# Patient Record
Sex: MALE | Race: WHITE | Employment: OTHER | URBAN - METROPOLITAN AREA
[De-identification: names, ages, dates, MRNs, and addresses within clinical notes are randomized per-mention and may not be internally consistent; named-entity substitution may affect disease eponyms.]

---

## 2018-12-15 ENCOUNTER — APPOINTMENT (OUTPATIENT)
Dept: CT IMAGING | Facility: CLINIC | Age: 83
DRG: 470 | End: 2018-12-15
Attending: EMERGENCY MEDICINE
Payer: MEDICARE

## 2018-12-15 ENCOUNTER — ANESTHESIA (OUTPATIENT)
Dept: SURGERY | Facility: CLINIC | Age: 83
DRG: 470 | End: 2018-12-15
Payer: MEDICARE

## 2018-12-15 ENCOUNTER — APPOINTMENT (OUTPATIENT)
Dept: GENERAL RADIOLOGY | Facility: CLINIC | Age: 83
DRG: 470 | End: 2018-12-15
Attending: PHYSICIAN ASSISTANT
Payer: MEDICARE

## 2018-12-15 ENCOUNTER — ANESTHESIA EVENT (OUTPATIENT)
Dept: SURGERY | Facility: CLINIC | Age: 83
DRG: 470 | End: 2018-12-15
Payer: MEDICARE

## 2018-12-15 ENCOUNTER — HOSPITAL ENCOUNTER (INPATIENT)
Facility: CLINIC | Age: 83
LOS: 3 days | Discharge: HOME HEALTH | DRG: 470 | End: 2018-12-18
Attending: EMERGENCY MEDICINE | Admitting: INTERNAL MEDICINE
Payer: MEDICARE

## 2018-12-15 DIAGNOSIS — I71.40 ABDOMINAL AORTIC ANEURYSM (AAA) WITHOUT RUPTURE (H): ICD-10-CM

## 2018-12-15 DIAGNOSIS — S72.002A FRACTURE OF HIP, LEFT, CLOSED, INITIAL ENCOUNTER (H): Primary | ICD-10-CM

## 2018-12-15 DIAGNOSIS — S72.002A CLOSED FRACTURE OF NECK OF LEFT FEMUR, INITIAL ENCOUNTER (H): ICD-10-CM

## 2018-12-15 PROBLEM — S72.009A HIP FRACTURE REQUIRING OPERATIVE REPAIR (H): Status: ACTIVE | Noted: 2018-12-15

## 2018-12-15 LAB
ANION GAP SERPL CALCULATED.3IONS-SCNC: 5 MMOL/L (ref 3–14)
BASOPHILS # BLD AUTO: 0 10E9/L (ref 0–0.2)
BASOPHILS NFR BLD AUTO: 0.4 %
BUN SERPL-MCNC: 21 MG/DL (ref 7–30)
CALCIUM SERPL-MCNC: 8.5 MG/DL (ref 8.5–10.1)
CHLORIDE SERPL-SCNC: 105 MMOL/L (ref 94–109)
CO2 SERPL-SCNC: 27 MMOL/L (ref 20–32)
CREAT SERPL-MCNC: 0.81 MG/DL (ref 0.66–1.25)
DIFFERENTIAL METHOD BLD: ABNORMAL
EOSINOPHIL # BLD AUTO: 0.1 10E9/L (ref 0–0.7)
EOSINOPHIL NFR BLD AUTO: 1.2 %
ERYTHROCYTE [DISTWIDTH] IN BLOOD BY AUTOMATED COUNT: 13.9 % (ref 10–15)
GFR SERPL CREATININE-BSD FRML MDRD: >90 ML/MIN/1.7M2
GLUCOSE SERPL-MCNC: 89 MG/DL (ref 70–99)
HCT VFR BLD AUTO: 40.7 % (ref 40–53)
HGB BLD-MCNC: 12.3 G/DL (ref 13.3–17.7)
IMM GRANULOCYTES # BLD: 0.1 10E9/L (ref 0–0.4)
IMM GRANULOCYTES NFR BLD: 1 %
LYMPHOCYTES # BLD AUTO: 1.9 10E9/L (ref 0.8–5.3)
LYMPHOCYTES NFR BLD AUTO: 20.5 %
MCH RBC QN AUTO: 24.8 PG (ref 26.5–33)
MCHC RBC AUTO-ENTMCNC: 30.2 G/DL (ref 31.5–36.5)
MCV RBC AUTO: 82 FL (ref 78–100)
MONOCYTES # BLD AUTO: 0.9 10E9/L (ref 0–1.3)
MONOCYTES NFR BLD AUTO: 9.9 %
NEUTROPHILS # BLD AUTO: 6.3 10E9/L (ref 1.6–8.3)
NEUTROPHILS NFR BLD AUTO: 67 %
NRBC # BLD AUTO: 0 10*3/UL
NRBC BLD AUTO-RTO: 0 /100
PLATELET # BLD AUTO: 195 10E9/L (ref 150–450)
POTASSIUM SERPL-SCNC: 4 MMOL/L (ref 3.4–5.3)
RBC # BLD AUTO: 4.95 10E12/L (ref 4.4–5.9)
SODIUM SERPL-SCNC: 137 MMOL/L (ref 133–144)
WBC # BLD AUTO: 9.5 10E9/L (ref 4–11)

## 2018-12-15 PROCEDURE — 25000128 H RX IP 250 OP 636: Performed by: ANESTHESIOLOGY

## 2018-12-15 PROCEDURE — 25000125 ZZHC RX 250: Performed by: ORTHOPAEDIC SURGERY

## 2018-12-15 PROCEDURE — 70450 CT HEAD/BRAIN W/O DYE: CPT

## 2018-12-15 PROCEDURE — 27210794 ZZH OR GENERAL SUPPLY STERILE: Performed by: ORTHOPAEDIC SURGERY

## 2018-12-15 PROCEDURE — 36000063 ZZH SURGERY LEVEL 4 EA 15 ADDTL MIN: Performed by: ORTHOPAEDIC SURGERY

## 2018-12-15 PROCEDURE — 37000008 ZZH ANESTHESIA TECHNICAL FEE, 1ST 30 MIN: Performed by: ORTHOPAEDIC SURGERY

## 2018-12-15 PROCEDURE — 72125 CT NECK SPINE W/O DYE: CPT

## 2018-12-15 PROCEDURE — 40000985 XR PELVIS AND HIP PORTABLE LEFT 1 VIEW

## 2018-12-15 PROCEDURE — C1776 JOINT DEVICE (IMPLANTABLE): HCPCS | Performed by: ORTHOPAEDIC SURGERY

## 2018-12-15 PROCEDURE — 25000125 ZZHC RX 250: Performed by: NURSE ANESTHETIST, CERTIFIED REGISTERED

## 2018-12-15 PROCEDURE — 71000014 ZZH RECOVERY PHASE 1 LEVEL 2 FIRST HR: Performed by: ORTHOPAEDIC SURGERY

## 2018-12-15 PROCEDURE — 99285 EMERGENCY DEPT VISIT HI MDM: CPT | Mod: 25

## 2018-12-15 PROCEDURE — 93005 ELECTROCARDIOGRAM TRACING: CPT

## 2018-12-15 PROCEDURE — 96374 THER/PROPH/DIAG INJ IV PUSH: CPT

## 2018-12-15 PROCEDURE — 71000015 ZZH RECOVERY PHASE 1 LEVEL 2 EA ADDTL HR: Performed by: ORTHOPAEDIC SURGERY

## 2018-12-15 PROCEDURE — 27810169 ZZH OR IMPLANT GENERAL: Performed by: ORTHOPAEDIC SURGERY

## 2018-12-15 PROCEDURE — 12000007 ZZH R&B INTERMEDIATE

## 2018-12-15 PROCEDURE — 36000093 ZZH SURGERY LEVEL 4 1ST 30 MIN: Performed by: ORTHOPAEDIC SURGERY

## 2018-12-15 PROCEDURE — 0SRS0J9 REPLACEMENT OF LEFT HIP JOINT, FEMORAL SURFACE WITH SYNTHETIC SUBSTITUTE, CEMENTED, OPEN APPROACH: ICD-10-PCS | Performed by: ORTHOPAEDIC SURGERY

## 2018-12-15 PROCEDURE — 99223 1ST HOSP IP/OBS HIGH 75: CPT | Mod: AI | Performed by: INTERNAL MEDICINE

## 2018-12-15 PROCEDURE — 37000009 ZZH ANESTHESIA TECHNICAL FEE, EACH ADDTL 15 MIN: Performed by: ORTHOPAEDIC SURGERY

## 2018-12-15 PROCEDURE — 72192 CT PELVIS W/O DYE: CPT

## 2018-12-15 PROCEDURE — 25000128 H RX IP 250 OP 636: Performed by: PHYSICIAN ASSISTANT

## 2018-12-15 PROCEDURE — 25000128 H RX IP 250 OP 636: Performed by: NURSE ANESTHETIST, CERTIFIED REGISTERED

## 2018-12-15 PROCEDURE — 25000128 H RX IP 250 OP 636: Performed by: INTERNAL MEDICINE

## 2018-12-15 PROCEDURE — 25000128 H RX IP 250 OP 636: Performed by: ORTHOPAEDIC SURGERY

## 2018-12-15 PROCEDURE — 25000128 H RX IP 250 OP 636: Performed by: EMERGENCY MEDICINE

## 2018-12-15 PROCEDURE — 85025 COMPLETE CBC W/AUTO DIFF WBC: CPT | Performed by: EMERGENCY MEDICINE

## 2018-12-15 PROCEDURE — 25800025 ZZH RX 258: Performed by: ORTHOPAEDIC SURGERY

## 2018-12-15 PROCEDURE — 40000306 ZZH STATISTIC PRE PROC ASSESS II: Performed by: ORTHOPAEDIC SURGERY

## 2018-12-15 PROCEDURE — 80048 BASIC METABOLIC PNL TOTAL CA: CPT | Performed by: EMERGENCY MEDICINE

## 2018-12-15 DEVICE — IMPLANTABLE DEVICE: Type: IMPLANTABLE DEVICE | Site: HIP | Status: FUNCTIONAL

## 2018-12-15 DEVICE — IMP HEAD STRK FEMORAL UHR BIPOLAR 28X52MM UH1-52-28: Type: IMPLANTABLE DEVICE | Site: HIP | Status: FUNCTIONAL

## 2018-12-15 DEVICE — BONE CEMENT MIXING SYSTEM W/FEM PRESSURIZER 06065730000: Type: IMPLANTABLE DEVICE | Status: FUNCTIONAL

## 2018-12-15 DEVICE — IMP HEAD FEMORAL STRK V40 VITALIUM COCR 28MM +0MM 6260-5-128: Type: IMPLANTABLE DEVICE | Site: HIP | Status: FUNCTIONAL

## 2018-12-15 DEVICE — BONE CEMENT STRK SIMPLEX P SPEEDSET 6192-1-001: Type: IMPLANTABLE DEVICE | Site: HIP | Status: FUNCTIONAL

## 2018-12-15 DEVICE — IMP SPACER OSTEONICS DISTAL CEMENT 12MM 1067-0012: Type: IMPLANTABLE DEVICE | Site: HIP | Status: FUNCTIONAL

## 2018-12-15 DEVICE — BONE CEMENT RESTRICTOR BUCK FEMORAL 18.5MM 129418: Type: IMPLANTABLE DEVICE | Site: HIP | Status: FUNCTIONAL

## 2018-12-15 RX ORDER — LIDOCAINE 40 MG/G
CREAM TOPICAL
Status: DISCONTINUED | OUTPATIENT
Start: 2018-12-15 | End: 2018-12-15

## 2018-12-15 RX ORDER — FINASTERIDE 5 MG/1
5 TABLET, FILM COATED ORAL DAILY
COMMUNITY

## 2018-12-15 RX ORDER — LIDOCAINE 40 MG/G
CREAM TOPICAL
Status: DISCONTINUED | OUTPATIENT
Start: 2018-12-15 | End: 2018-12-18 | Stop reason: HOSPADM

## 2018-12-15 RX ORDER — LABETALOL HYDROCHLORIDE 5 MG/ML
10 INJECTION, SOLUTION INTRAVENOUS ONCE
Status: COMPLETED | OUTPATIENT
Start: 2018-12-15 | End: 2018-12-15

## 2018-12-15 RX ORDER — CEFAZOLIN SODIUM 2 G/100ML
2 INJECTION, SOLUTION INTRAVENOUS
Status: COMPLETED | OUTPATIENT
Start: 2018-12-15 | End: 2018-12-15

## 2018-12-15 RX ORDER — DORZOLAMIDE HYDROCHLORIDE AND TIMOLOL MALEATE 20; 5 MG/ML; MG/ML
1 SOLUTION/ DROPS OPHTHALMIC 2 TIMES DAILY
Status: DISCONTINUED | OUTPATIENT
Start: 2018-12-15 | End: 2018-12-18 | Stop reason: HOSPADM

## 2018-12-15 RX ORDER — CARBIDOPA AND LEVODOPA 25; 100 MG/1; MG/1
2 TABLET, EXTENDED RELEASE ORAL 4 TIMES DAILY
Status: DISCONTINUED | OUTPATIENT
Start: 2018-12-15 | End: 2018-12-18 | Stop reason: HOSPADM

## 2018-12-15 RX ORDER — QUETIAPINE FUMARATE 50 MG/1
50 TABLET, FILM COATED ORAL 2 TIMES DAILY
COMMUNITY
End: 2018-12-15

## 2018-12-15 RX ORDER — ONDANSETRON 4 MG/1
4 TABLET, ORALLY DISINTEGRATING ORAL EVERY 6 HOURS PRN
Status: DISCONTINUED | OUTPATIENT
Start: 2018-12-15 | End: 2018-12-15

## 2018-12-15 RX ORDER — LABETALOL HYDROCHLORIDE 5 MG/ML
10 INJECTION, SOLUTION INTRAVENOUS
Status: DISCONTINUED | OUTPATIENT
Start: 2018-12-15 | End: 2018-12-15 | Stop reason: HOSPADM

## 2018-12-15 RX ORDER — HYDRALAZINE HYDROCHLORIDE 20 MG/ML
2.5-5 INJECTION INTRAMUSCULAR; INTRAVENOUS EVERY 10 MIN PRN
Status: DISCONTINUED | OUTPATIENT
Start: 2018-12-15 | End: 2018-12-15 | Stop reason: HOSPADM

## 2018-12-15 RX ORDER — HYDROMORPHONE HYDROCHLORIDE 1 MG/ML
0.2 INJECTION, SOLUTION INTRAMUSCULAR; INTRAVENOUS; SUBCUTANEOUS
Status: DISCONTINUED | OUTPATIENT
Start: 2018-12-15 | End: 2018-12-15

## 2018-12-15 RX ORDER — CEFAZOLIN SODIUM 1 G/50ML
1 INJECTION, SOLUTION INTRAVENOUS EVERY 8 HOURS
Status: COMPLETED | OUTPATIENT
Start: 2018-12-16 | End: 2018-12-16

## 2018-12-15 RX ORDER — SODIUM CHLORIDE, SODIUM LACTATE, POTASSIUM CHLORIDE, CALCIUM CHLORIDE 600; 310; 30; 20 MG/100ML; MG/100ML; MG/100ML; MG/100ML
INJECTION, SOLUTION INTRAVENOUS CONTINUOUS
Status: DISCONTINUED | OUTPATIENT
Start: 2018-12-15 | End: 2018-12-16

## 2018-12-15 RX ORDER — KETAMINE HYDROCHLORIDE 50 MG/ML
INJECTION, SOLUTION INTRAMUSCULAR; INTRAVENOUS PRN
Status: DISCONTINUED | OUTPATIENT
Start: 2018-12-15 | End: 2018-12-15

## 2018-12-15 RX ORDER — ACETAMINOPHEN 325 MG/1
650 TABLET ORAL EVERY 4 HOURS PRN
Status: DISCONTINUED | OUTPATIENT
Start: 2018-12-15 | End: 2018-12-16

## 2018-12-15 RX ORDER — ACETAMINOPHEN 325 MG/1
650 TABLET ORAL EVERY 4 HOURS PRN
Status: DISCONTINUED | OUTPATIENT
Start: 2018-12-18 | End: 2018-12-15

## 2018-12-15 RX ORDER — HYDRALAZINE HYDROCHLORIDE 20 MG/ML
10 INJECTION INTRAMUSCULAR; INTRAVENOUS ONCE
Status: DISCONTINUED | OUTPATIENT
Start: 2018-12-15 | End: 2018-12-15 | Stop reason: HOSPADM

## 2018-12-15 RX ORDER — QUETIAPINE FUMARATE 50 MG/1
50 TABLET, FILM COATED ORAL EVERY MORNING
Status: DISCONTINUED | OUTPATIENT
Start: 2018-12-16 | End: 2018-12-18 | Stop reason: HOSPADM

## 2018-12-15 RX ORDER — DORZOLAMIDE HYDROCHLORIDE AND TIMOLOL MALEATE 20; 5 MG/ML; MG/ML
1 SOLUTION/ DROPS OPHTHALMIC 2 TIMES DAILY
COMMUNITY

## 2018-12-15 RX ORDER — BISACODYL 10 MG
10 SUPPOSITORY, RECTAL RECTAL DAILY PRN
COMMUNITY

## 2018-12-15 RX ORDER — CARBOXYMETHYLCELLULOSE SODIUM 5 MG/ML
1 SOLUTION/ DROPS OPHTHALMIC AT BEDTIME
Status: ON HOLD | COMMUNITY
End: 2019-08-31

## 2018-12-15 RX ORDER — GLYCOPYRROLATE 0.2 MG/ML
INJECTION, SOLUTION INTRAMUSCULAR; INTRAVENOUS PRN
Status: DISCONTINUED | OUTPATIENT
Start: 2018-12-15 | End: 2018-12-15

## 2018-12-15 RX ORDER — ACETAMINOPHEN 325 MG/1
975 TABLET ORAL EVERY 8 HOURS
Status: DISCONTINUED | OUTPATIENT
Start: 2018-12-15 | End: 2018-12-18 | Stop reason: HOSPADM

## 2018-12-15 RX ORDER — BACLOFEN 10 MG/1
10 TABLET ORAL 2 TIMES DAILY
Status: ON HOLD | COMMUNITY
End: 2019-08-31

## 2018-12-15 RX ORDER — LORAZEPAM 0.5 MG/1
0.25 TABLET ORAL 2 TIMES DAILY PRN
Status: ON HOLD | COMMUNITY
End: 2019-08-31

## 2018-12-15 RX ORDER — BUPIVACAINE HYDROCHLORIDE 5 MG/ML
INJECTION, SOLUTION EPIDURAL; INTRACAUDAL PRN
Status: DISCONTINUED | OUTPATIENT
Start: 2018-12-15 | End: 2018-12-15

## 2018-12-15 RX ORDER — ACETAMINOPHEN 650 MG/1
650 SUPPOSITORY RECTAL EVERY 4 HOURS PRN
Status: DISCONTINUED | OUTPATIENT
Start: 2018-12-15 | End: 2018-12-16

## 2018-12-15 RX ORDER — HYDROMORPHONE HYDROCHLORIDE 1 MG/ML
.3-.5 INJECTION, SOLUTION INTRAMUSCULAR; INTRAVENOUS; SUBCUTANEOUS EVERY 5 MIN PRN
Status: DISCONTINUED | OUTPATIENT
Start: 2018-12-15 | End: 2018-12-15 | Stop reason: HOSPADM

## 2018-12-15 RX ORDER — QUETIAPINE FUMARATE 25 MG/1
12.5 TABLET, FILM COATED ORAL 3 TIMES DAILY PRN
COMMUNITY

## 2018-12-15 RX ORDER — NALOXONE HYDROCHLORIDE 0.4 MG/ML
.1-.4 INJECTION, SOLUTION INTRAMUSCULAR; INTRAVENOUS; SUBCUTANEOUS
Status: DISCONTINUED | OUTPATIENT
Start: 2018-12-15 | End: 2018-12-18 | Stop reason: HOSPADM

## 2018-12-15 RX ORDER — ASPIRIN 81 MG/1
162 TABLET ORAL 2 TIMES DAILY WITH MEALS
Status: DISCONTINUED | OUTPATIENT
Start: 2018-12-16 | End: 2018-12-18 | Stop reason: HOSPADM

## 2018-12-15 RX ORDER — ONDANSETRON 2 MG/ML
4 INJECTION INTRAMUSCULAR; INTRAVENOUS EVERY 30 MIN PRN
Status: DISCONTINUED | OUTPATIENT
Start: 2018-12-15 | End: 2018-12-15 | Stop reason: HOSPADM

## 2018-12-15 RX ORDER — OXYCODONE HYDROCHLORIDE 5 MG/1
5-10 TABLET ORAL EVERY 4 HOURS PRN
Status: DISCONTINUED | OUTPATIENT
Start: 2018-12-15 | End: 2018-12-16

## 2018-12-15 RX ORDER — SODIUM CHLORIDE, SODIUM LACTATE, POTASSIUM CHLORIDE, CALCIUM CHLORIDE 600; 310; 30; 20 MG/100ML; MG/100ML; MG/100ML; MG/100ML
INJECTION, SOLUTION INTRAVENOUS CONTINUOUS
Status: DISCONTINUED | OUTPATIENT
Start: 2018-12-15 | End: 2018-12-15 | Stop reason: HOSPADM

## 2018-12-15 RX ORDER — GABAPENTIN 300 MG/1
300 CAPSULE ORAL AT BEDTIME
COMMUNITY

## 2018-12-15 RX ORDER — BISACODYL 5 MG
15 TABLET, DELAYED RELEASE (ENTERIC COATED) ORAL DAILY PRN
Status: DISCONTINUED | OUTPATIENT
Start: 2018-12-15 | End: 2018-12-18 | Stop reason: HOSPADM

## 2018-12-15 RX ORDER — ONDANSETRON 4 MG/1
4 TABLET, ORALLY DISINTEGRATING ORAL EVERY 30 MIN PRN
Status: DISCONTINUED | OUTPATIENT
Start: 2018-12-15 | End: 2018-12-15 | Stop reason: HOSPADM

## 2018-12-15 RX ORDER — NALOXONE HYDROCHLORIDE 0.4 MG/ML
.1-.4 INJECTION, SOLUTION INTRAMUSCULAR; INTRAVENOUS; SUBCUTANEOUS
Status: DISCONTINUED | OUTPATIENT
Start: 2018-12-15 | End: 2018-12-15

## 2018-12-15 RX ORDER — AMOXICILLIN 250 MG
2 CAPSULE ORAL 2 TIMES DAILY
Status: DISCONTINUED | OUTPATIENT
Start: 2018-12-15 | End: 2018-12-18 | Stop reason: HOSPADM

## 2018-12-15 RX ORDER — LATANOPROST 50 UG/ML
1 SOLUTION/ DROPS OPHTHALMIC AT BEDTIME
Status: DISCONTINUED | OUTPATIENT
Start: 2018-12-15 | End: 2018-12-18 | Stop reason: HOSPADM

## 2018-12-15 RX ORDER — CEFAZOLIN SODIUM 1 G/50ML
1 INJECTION, SOLUTION INTRAVENOUS SEE ADMIN INSTRUCTIONS
Status: DISCONTINUED | OUTPATIENT
Start: 2018-12-15 | End: 2018-12-15 | Stop reason: HOSPADM

## 2018-12-15 RX ORDER — BISACODYL 5 MG
10 TABLET, DELAYED RELEASE (ENTERIC COATED) ORAL DAILY PRN
Status: DISCONTINUED | OUTPATIENT
Start: 2018-12-15 | End: 2018-12-18 | Stop reason: HOSPADM

## 2018-12-15 RX ORDER — TAMSULOSIN HYDROCHLORIDE 0.4 MG/1
0.8 CAPSULE ORAL AT BEDTIME
COMMUNITY

## 2018-12-15 RX ORDER — BACLOFEN 10 MG/1
10 TABLET ORAL 2 TIMES DAILY
Status: DISCONTINUED | OUTPATIENT
Start: 2018-12-15 | End: 2018-12-18 | Stop reason: HOSPADM

## 2018-12-15 RX ORDER — AMOXICILLIN 250 MG
1 CAPSULE ORAL 2 TIMES DAILY
Status: DISCONTINUED | OUTPATIENT
Start: 2018-12-15 | End: 2018-12-18 | Stop reason: HOSPADM

## 2018-12-15 RX ORDER — ONDANSETRON 4 MG/1
4 TABLET, ORALLY DISINTEGRATING ORAL EVERY 6 HOURS PRN
Status: DISCONTINUED | OUTPATIENT
Start: 2018-12-15 | End: 2018-12-18 | Stop reason: HOSPADM

## 2018-12-15 RX ORDER — PROPOFOL 10 MG/ML
INJECTION, EMULSION INTRAVENOUS CONTINUOUS PRN
Status: DISCONTINUED | OUTPATIENT
Start: 2018-12-15 | End: 2018-12-15

## 2018-12-15 RX ORDER — FENTANYL CITRATE 50 UG/ML
25-50 INJECTION, SOLUTION INTRAMUSCULAR; INTRAVENOUS
Status: DISCONTINUED | OUTPATIENT
Start: 2018-12-15 | End: 2018-12-15 | Stop reason: HOSPADM

## 2018-12-15 RX ORDER — CARBIDOPA AND LEVODOPA 25; 100 MG/1; MG/1
2 TABLET ORAL 4 TIMES DAILY
COMMUNITY
End: 2018-12-15

## 2018-12-15 RX ORDER — LEVOTHYROXINE SODIUM 25 UG/1
50 TABLET ORAL DAILY
Status: DISCONTINUED | OUTPATIENT
Start: 2018-12-16 | End: 2018-12-18 | Stop reason: HOSPADM

## 2018-12-15 RX ORDER — DOCUSATE SODIUM 100 MG/1
100 CAPSULE, LIQUID FILLED ORAL 2 TIMES DAILY
Status: DISCONTINUED | OUTPATIENT
Start: 2018-12-15 | End: 2018-12-18 | Stop reason: HOSPADM

## 2018-12-15 RX ORDER — ONDANSETRON 2 MG/ML
4 INJECTION INTRAMUSCULAR; INTRAVENOUS EVERY 6 HOURS PRN
Status: DISCONTINUED | OUTPATIENT
Start: 2018-12-15 | End: 2018-12-18 | Stop reason: HOSPADM

## 2018-12-15 RX ORDER — BRIMONIDINE TARTRATE 2 MG/ML
1 SOLUTION/ DROPS OPHTHALMIC 2 TIMES DAILY
COMMUNITY

## 2018-12-15 RX ORDER — SODIUM CHLORIDE, SODIUM LACTATE, POTASSIUM CHLORIDE, CALCIUM CHLORIDE 600; 310; 30; 20 MG/100ML; MG/100ML; MG/100ML; MG/100ML
INJECTION, SOLUTION INTRAVENOUS CONTINUOUS PRN
Status: DISCONTINUED | OUTPATIENT
Start: 2018-12-15 | End: 2018-12-15

## 2018-12-15 RX ORDER — LABETALOL HYDROCHLORIDE 5 MG/ML
20 INJECTION, SOLUTION INTRAVENOUS ONCE
Status: DISCONTINUED | OUTPATIENT
Start: 2018-12-15 | End: 2018-12-15

## 2018-12-15 RX ORDER — DIMENHYDRINATE 50 MG/ML
25 INJECTION, SOLUTION INTRAMUSCULAR; INTRAVENOUS
Status: DISCONTINUED | OUTPATIENT
Start: 2018-12-15 | End: 2018-12-15 | Stop reason: HOSPADM

## 2018-12-15 RX ORDER — LATANOPROST 50 UG/ML
1 SOLUTION/ DROPS OPHTHALMIC AT BEDTIME
COMMUNITY

## 2018-12-15 RX ORDER — LEVOTHYROXINE SODIUM 50 UG/1
50 TABLET ORAL DAILY
COMMUNITY

## 2018-12-15 RX ORDER — FENTANYL CITRATE 50 UG/ML
25 INJECTION, SOLUTION INTRAMUSCULAR; INTRAVENOUS ONCE
Status: COMPLETED | OUTPATIENT
Start: 2018-12-15 | End: 2018-12-15

## 2018-12-15 RX ORDER — BRIMONIDINE TARTRATE 2 MG/ML
1 SOLUTION/ DROPS OPHTHALMIC 2 TIMES DAILY
Status: DISCONTINUED | OUTPATIENT
Start: 2018-12-15 | End: 2018-12-18 | Stop reason: HOSPADM

## 2018-12-15 RX ORDER — CARBIDOPA AND LEVODOPA 25; 100 MG/1; MG/1
1 TABLET, EXTENDED RELEASE ORAL 3 TIMES DAILY
COMMUNITY

## 2018-12-15 RX ORDER — BISACODYL 5 MG
5 TABLET, DELAYED RELEASE (ENTERIC COATED) ORAL DAILY PRN
Status: DISCONTINUED | OUTPATIENT
Start: 2018-12-15 | End: 2018-12-18 | Stop reason: HOSPADM

## 2018-12-15 RX ORDER — BISACODYL 10 MG
10 SUPPOSITORY, RECTAL RECTAL DAILY PRN
Status: DISCONTINUED | OUTPATIENT
Start: 2018-12-15 | End: 2018-12-18 | Stop reason: HOSPADM

## 2018-12-15 RX ORDER — FENTANYL CITRATE 50 UG/ML
50 INJECTION, SOLUTION INTRAMUSCULAR; INTRAVENOUS ONCE
Status: COMPLETED | OUTPATIENT
Start: 2018-12-15 | End: 2018-12-15

## 2018-12-15 RX ORDER — ONDANSETRON 2 MG/ML
4 INJECTION INTRAMUSCULAR; INTRAVENOUS EVERY 6 HOURS PRN
Status: DISCONTINUED | OUTPATIENT
Start: 2018-12-15 | End: 2018-12-15

## 2018-12-15 RX ORDER — LIDOCAINE HYDROCHLORIDE ANHYDROUS AND EPINEPHRINE 10; 10 MG/ML; UG/ML
30 INJECTION, SOLUTION INFILTRATION ONCE
Status: DISCONTINUED | OUTPATIENT
Start: 2018-12-15 | End: 2018-12-17

## 2018-12-15 RX ORDER — HYDROXYZINE HYDROCHLORIDE 10 MG/1
10 TABLET, FILM COATED ORAL EVERY 6 HOURS PRN
Status: DISCONTINUED | OUTPATIENT
Start: 2018-12-15 | End: 2018-12-18 | Stop reason: HOSPADM

## 2018-12-15 RX ORDER — TAMSULOSIN HYDROCHLORIDE 0.4 MG/1
0.8 CAPSULE ORAL AT BEDTIME
Status: DISCONTINUED | OUTPATIENT
Start: 2018-12-15 | End: 2018-12-18 | Stop reason: HOSPADM

## 2018-12-15 RX ORDER — HYDROMORPHONE HYDROCHLORIDE 1 MG/ML
.3-.5 INJECTION, SOLUTION INTRAMUSCULAR; INTRAVENOUS; SUBCUTANEOUS
Status: DISCONTINUED | OUTPATIENT
Start: 2018-12-15 | End: 2018-12-16

## 2018-12-15 RX ORDER — FINASTERIDE 5 MG/1
5 TABLET, FILM COATED ORAL DAILY
Status: DISCONTINUED | OUTPATIENT
Start: 2018-12-16 | End: 2018-12-18 | Stop reason: HOSPADM

## 2018-12-15 RX ORDER — CARBOXYMETHYLCELLULOSE SODIUM 5 MG/ML
1 SOLUTION/ DROPS OPHTHALMIC AT BEDTIME
Status: DISCONTINUED | OUTPATIENT
Start: 2018-12-15 | End: 2018-12-16

## 2018-12-15 RX ADMIN — PROPOFOL 35 MCG/KG/MIN: 10 INJECTION, EMULSION INTRAVENOUS at 20:15

## 2018-12-15 RX ADMIN — PHENYLEPHRINE HYDROCHLORIDE 50 MCG: 10 INJECTION, SOLUTION INTRAMUSCULAR; INTRAVENOUS; SUBCUTANEOUS at 20:32

## 2018-12-15 RX ADMIN — PHENYLEPHRINE HYDROCHLORIDE 100 MCG: 10 INJECTION, SOLUTION INTRAMUSCULAR; INTRAVENOUS; SUBCUTANEOUS at 20:52

## 2018-12-15 RX ADMIN — CEFAZOLIN SODIUM 2 G: 2 INJECTION, SOLUTION INTRAVENOUS at 20:05

## 2018-12-15 RX ADMIN — FENTANYL CITRATE 25 MCG: 50 INJECTION INTRAMUSCULAR; INTRAVENOUS at 17:47

## 2018-12-15 RX ADMIN — SODIUM CHLORIDE, POTASSIUM CHLORIDE, SODIUM LACTATE AND CALCIUM CHLORIDE: 600; 310; 30; 20 INJECTION, SOLUTION INTRAVENOUS at 19:59

## 2018-12-15 RX ADMIN — PHENYLEPHRINE HYDROCHLORIDE 50 MCG: 10 INJECTION, SOLUTION INTRAMUSCULAR; INTRAVENOUS; SUBCUTANEOUS at 20:44

## 2018-12-15 RX ADMIN — Medication 0.5 MG: at 23:43

## 2018-12-15 RX ADMIN — Medication: at 16:41

## 2018-12-15 RX ADMIN — KETAMINE HYDROCHLORIDE 5 MG: 50 INJECTION, SOLUTION INTRAMUSCULAR; INTRAVENOUS at 20:06

## 2018-12-15 RX ADMIN — LABETALOL HYDROCHLORIDE 10 MG: 5 INJECTION INTRAVENOUS at 18:53

## 2018-12-15 RX ADMIN — KETAMINE HYDROCHLORIDE 10 MG: 50 INJECTION, SOLUTION INTRAMUSCULAR; INTRAVENOUS at 19:59

## 2018-12-15 RX ADMIN — SODIUM CHLORIDE, POTASSIUM CHLORIDE, SODIUM LACTATE AND CALCIUM CHLORIDE: 600; 310; 30; 20 INJECTION, SOLUTION INTRAVENOUS at 23:45

## 2018-12-15 RX ADMIN — FENTANYL CITRATE 25 MCG: 50 INJECTION INTRAMUSCULAR; INTRAVENOUS at 18:55

## 2018-12-15 RX ADMIN — GLYCOPYRROLATE 0.1 MG: 0.2 INJECTION, SOLUTION INTRAMUSCULAR; INTRAVENOUS at 20:01

## 2018-12-15 RX ADMIN — MIDAZOLAM 1 MG: 1 INJECTION INTRAMUSCULAR; INTRAVENOUS at 19:59

## 2018-12-15 RX ADMIN — BUPIVACAINE HYDROCHLORIDE 15 MG: 5 INJECTION, SOLUTION EPIDURAL; INTRACAUDAL at 20:10

## 2018-12-15 RX ADMIN — LABETALOL HYDROCHLORIDE 10 MG: 5 INJECTION INTRAVENOUS at 17:47

## 2018-12-15 RX ADMIN — FENTANYL CITRATE 50 MCG: 50 INJECTION, SOLUTION INTRAMUSCULAR; INTRAVENOUS at 13:55

## 2018-12-15 ASSESSMENT — LIFESTYLE VARIABLES: TOBACCO_USE: 0

## 2018-12-15 ASSESSMENT — ENCOUNTER SYMPTOMS
NECK PAIN: 1
FEVER: 0
SEIZURES: 0
DIARRHEA: 1
VOMITING: 0
DYSRHYTHMIAS: 0

## 2018-12-15 ASSESSMENT — COPD QUESTIONNAIRES: COPD: 0

## 2018-12-15 NOTE — ED NOTES
[facility]  ED Nurse Handoff Report    Chief Complaint:     Hip Pain     History limited due to possible dementia. History provided by the patient's relative and patient.      PHYLLIS Turk is a 83 year old male with a history of Parkinson's Disease and AAA, who presents with hip pain via EMS. The patient's relative reports that the patient had a fall on 12/13 that was unwitnessed while at his care facility, [facility]. This morning, he had another fall that was unwitnessed. When his son in law arrived, the patient was in so much pain on his left pain that he was not moveable until EMS arrived and gave him pain medications. Upon arrival, the patient is endorsing having left sided hip pain, neck pain, and hitting his forehead upon falling. He denies rib pain, recent fever, vomiting, or infection. Per his normal, he has diarrhea due to medications. Lastly, then patient's relative details that due to the Parkinson's, the patient's legs sometimes give out.      ED Chief complaint: Hip Pain  ED Diagnosis:   Final diagnoses:   Closed displaced intertrochanteric fracture of left femur, initial encounter (H)     Allergies: No Known Allergies    Code Status: Code Status not listed.  Activity level - Baseline/Home:  Independent. Activity Level - Current:   Total Care. Lift room needed: Yes. Bariatric: No   Needed: No   Isolation: No. Infection: Not Applicable.     Vital Signs:   Vitals:    12/15/18 0938 12/15/18 1000   BP: (!) 143/99 (!) 167/128   Resp: 16 11   Temp: 97.6  F (36.4  C)    TempSrc: Temporal    SpO2: 96% 96%       Cardiac Rhythm:        Pain level:    Patient confused: Yes. Patient Falls Risk: Yes.   Elimination Status: Has voided   Patient Report - Initial Complaint: Hip Pain/Fall. Focused Assessment:   Musculoskeletal - Musculoskeletal WDL: WDL except Pain Body Location: hip LLE Extremity Movement: active ROM severely impaired CMS Intact: Yes Musculoskeletal Comment:  Patient presents with left hip pain after unwitnessed fall this morning. Patient has history of Parkinson's and dementia, unsure if patient hit his head, but is complaining of left hip pain.   Tests Performed: Blood Work, CT Pelvis Bone with Contrast, CT Head, CT Cervical Spine  Abnormal Results:   Labs Ordered and Resulted from Time of ED Arrival Up to the Time of Departure from the ED   CBC WITH PLATELETS DIFFERENTIAL - Abnormal; Notable for the following components:       Result Value    Hemoglobin 12.3 (*)     MCH 24.8 (*)     MCHC 30.2 (*)     All other components within normal limits   BASIC METABOLIC PANEL     CT Pelvis Bone wo Contrast   Preliminary Result   IMPRESSION:   1. Left femoral neck fracture.   2. 7.1 cm abdominal aortic aneurysm.   3. 3 cm right and 2.5 cm left common iliac artery aneurysms.   4. Additional findings discussed above.         CT Cervical Spine w/o Contrast   Preliminary Result   IMPRESSION: No evidence for fracture or traumatic malalignment of the   cervical spine.            CT Head w/o Contrast   Preliminary Result   IMPRESSION: Debris in the right maxillary sinus possibly representing   maxillary sinusitis although hemorrhage from epistaxis or fracture   through the nonvisualized portions of the right maxillary sinus are   also possible. Diffuse cerebral volume loss and cerebral white matter   changes consistent with chronic small vessel ischemic disease. No   evidence for acute intracranial pathology.                     Treatments provided: No treatments provided.   Family Comments: Lupillo, son, at bedside, aware of admission.   OBS brochure/video discussed/provided to patient:  N/A  ED Medications: Medications - No data to display  Drips infusing:  No  For the majority of the shift, the patient's behavior Green. Interventions performed were N/A.     Severe Sepsis OR Septic Shock Diagnosis Present: No      ED Nurse Name/Phone Number: Jessica Katia,   12:05 PM  RECEIVING UNIT ED  HANDOFF REVIEW    Above ED Nurse Handoff Report was reviewed: Yes  Reviewed by: Berenice Beltran on December 15, 2018 at 2:48 PM

## 2018-12-15 NOTE — PHARMACY-ADMISSION MEDICATION HISTORY
Admission medication history interview status for this patient is complete. See [facility] admission navigator for allergy information, prior to admission medications and immunization status.     Medication history interview source(s):Caregiver Jasmine [phone]  Medication history resources (including written lists, pill bottles, clinic record):MAR    Changes made to PTA medication list:  Added: sarna lotion, xalatan opth, refresh, flomax  Deleted: none  Changed: seroquel, sinemet CR    Actions taken by pharmacist (provider contacted, etc):spoke with Jasmine regarding sinemet CR to confirm QID frequency     Additional medication history information:None    Medication reconciliation/reorder completed by provider prior to medication history? No    For patients on insulin therapy: no (Yes/No)   Lantus/levemir/NPH/Mix 70/30 dose: ___ in AM/PM or twice daily   Sliding scale Novolog Y/N   If Yes, do you have a baseline novolog pre-meal dose: ______units with meals   Patients eat three meals a day: Y/N ---  How many episodes of hypoglycemia (low blood glucose) do you have weekly: ---   How many missed doses do you have a week: ---  How many times do you check your blood glucose per day: ---  Any Barriers to therapy: cost of medications/comfortable with giving injections (if applicable)/ comfortable and confident with current diabetes regimen ---      Prior to Admission medications    Medication Sig Last Dose Taking? Auth Provider   Acetaminophen (TYLENOL) 325 MG CAPS Take 650 mg by mouth every 6 hours as needed Past Week at Unknown time Yes Reported, Patient   baclofen (LIORESAL) 10 MG tablet Take 10 mg by mouth 2 times daily 12/14/2018 at Unknown time Yes Reported, Patient   bisacodyl ([facility] BISACODYL) 10 MG suppository Place 10 mg rectally daily as needed for constipation  Yes Reported, Patient   brimonidine (ALPHAGAN) 0.2 % ophthalmic solution Place 1 drop into the right eye 2 times daily 12/14/2018 at Unknown time Yes  Reported, Patient   camphor-menthol (SARNA) 0.5-0.5 % external lotion Apply topically At Bedtime Apply to feet and legs at bedtime 12/14/2018 at Unknown time Yes Unknown, Entered By History   carbidopa-levodopa (SINEMET CR)  MG CR tablet Take 2 tablets by mouth 4 times daily [id number] 12/15/2018 at 0400 Yes Unknown, Entered By History   Carboxymethylcellulose Sod PF (REFRESH PLUS) 0.5 % SOLN ophthalmic solution Place 1 drop into both eyes At Bedtime 12/14/2018 at Unknown time Yes Unknown, Entered By History   dorzolamide-timolol (COSOPT) 2-0.5 % ophthalmic solution Place 1 drop into the right eye 2 times daily 12/14/2018 at Unknown time Yes Reported, Patient   finasteride (PROSCAR) 5 MG tablet Take 5 mg by mouth daily 12/14/2018 at Unknown time Yes Reported, Patient   gabapentin (NEURONTIN) 300 MG capsule Take 300 mg by mouth 3 times daily 12/14/2018 at Unknown time Yes Reported, Patient   latanoprost (XALATAN) 0.005 % ophthalmic solution Place 1 drop into the right eye At Bedtime 12/14/2018 at Unknown time Yes Unknown, Entered By History   levothyroxine (SYNTHROID/LEVOTHROID) 50 MCG tablet Take 50 mcg by mouth daily 12/15/2018 at am Yes Reported, Patient   LORazepam (ATIVAN) 0.5 MG tablet Take 0.25 mg by mouth 2 times daily as needed for anxiety 12/15/2018 at Unknown time Yes Reported, Patient   magnesium hydroxide (MILK OF MAGNESIA) 400 MG/5ML suspension Take 15 mLs by mouth daily as needed for constipation or heartburn  Yes Reported, Patient   QUEtiapine (SEROQUEL) 50 MG tablet Take 50 mg by mouth every morning 12/14/2018 at Unknown time Yes Unknown, Entered By History   tamsulosin (FLOMAX) 0.4 MG capsule Take 0.8 mg by mouth At Bedtime 12/14/2018 at Unknown time Yes Unknown, Entered By History   Camphor-Eucalyptus-Menthol (VAPORIZING CHEST RUB EX)    Reported, Patient   QUEtiapine (SEROQUEL) 50 MG tablet Take 50 mg by mouth 2 times daily   Reported, Patient

## 2018-12-15 NOTE — ED TRIAGE NOTES
Patient presents via EMS with hip pain. Patient had an unwitnessed fall at his facility this morning. Patient has history of Parkinson's and new diagnosis of Dementia with Lewy Bodies. ABCDs intact, alert and oriented x 4.

## 2018-12-15 NOTE — H&P
[facility]    History and Physical - Hospitalist Service       Date of Admission:  12/15/2018    Assessment & Plan   Sylvester Turk is a 83 year old male admitted on 12/15/2018  with a history of Parkinson's Disease, Dementia and AAA  Who presented to the [facility] ER via EMS for hip pain after a fall likely related to his Parkinson disease and was found to have left neck hip fracture.    1.  Left femoral neck fracture  Ortho service was consulted by ED. Possible surgery later today.  Plan:  -Surgery as per Ortho service  -Pain management  -Keep patient n.p.o.  -Start IV fluid  -Check H&H  -DVT prophylaxis as per Ortho service    2.  Parkinson syndrome and we body dementia  -We will resume his Parkinson medications once reconciled by pharmacy.  Of note patient got his medication narrowing at this morning.  Continue Sinemet and baclofen  -Might benefit from a neuro consultation during his hospital stay after his surgery given the progression of the Parkinson syndrome which might lead to fall  -Continue Seroquel    3.  7.1 cm abdominal aortic aneurysm and bilateral common iliac artery aneurysm   -For now, the family does not want to pursue that at this time  -For now blood pressure control  -Vascular surgery consult based on family wishes    4.  Prostatic enlargement with impression on the bladder  -Pretty high risk of urinary retention postsurgery  -We will keep the Gaines at least temporarily following the procedure  -Continue his Poscar    5. Abnormal CT head  - Debris in the right maxillary sinus possibly representing maxillary sinusitis although hemorrhage from epistaxis or fracture through the nonvisualized portions of the right maxillary sinus are also possible.   Plan: monitor and reevaluate if further workup needed  - Diffuse cerebral volume loss and cerebral white matter changes consistent with chronic small vessel ischemic disease. No evidence for acute intracranial pathology.     6.   Hypothyroidism  -Check TSH  -Continue his Synthroid    7.  History of glaucoma and eye surgery  -Continue home hydrops    Diet: N.p.o. for primary surgery  DVT Prophylaxis: Defer to primary service  Gaines Catheter: not present  Code Status: Discuss with family (spouse and son). Patient was DNR/DNI prior to his admission. This was thus entered as an order.     Disposition Plan   Expected discharge: 2 - 3 days, recommended to To be determined  in consultation with neurology and physical therapy once Patient has recovered from his hip surgery.  Entered: Rashaun Meade MD 12/15/2018, 12:53 PM     The patient's care was discussed with the Patient's Family.    Rashaun Meade MD  [facility]    ______________________________________________________________________    Chief Complaint   Left hip pain related to left neck femoral fracture    Unable to obtain a history from the patient due to dementia.    History of Present Illness   Sylvester Turk is a 83 year old male admitted on 12/15/2018  with a history of Parkinson's Disease, Dementia and AAA  Who presented to the [facility] ER via EMS for hip pain.  Patient is unable to contribute to his history and the history was obtained through his son and wife.  Apparently the patient had had a fall on 12/13 while in his care facility, [facility] at the time there was no mention of fever chest pain or any change in his medical condition.  Patient is now with his Parkinson disease which has gotten worse with time and he was.  Scheduled to see a neurologist in the coming weeks.  He had apparently had another fall this morning.  The latter was associated with severe left hip pain.  EMS was activated he got on slides and pain medication.  According to recently also took his Parkinson medication earlier this morning.    Review of Systems    Review of system was limited by the patient's dementia and lack of cooperation.  According to his wife the patient  is quite limited by his Parkinson's disease which has progressed.  He has significant tremor more so on the right side.  He also has advanced dementia.  Still recognize his loved one.  He does have some agitation and hallucination.  There is no mention by the family of the patient experiencing recently had any fever increased shortness of breath cough chest pain or any other new symptoms.  The patient is known for AAA which is now 7 cm and which 4 cm last time it was checked per his wife.  The wife and the family are unclear at this point progressively they were informed of this being pursued.    Past Medical History    I have reviewed this patient's medical history and updated it with pertinent information if needed.   Past Medical History:   Diagnosis Date     Abdominal aortic aneurysm (AAA) (H)      Basal cell carcinoma      Benign prostatic hyperplasia with lower urinary tract symptoms      Dementia with Lewy bodies      Glaucoma      Hypothyroidism      Parkinson's disease (H)        Past Surgical History   I have reviewed this patient's surgical history and updated it with pertinent information if needed.  History reviewed. No pertinent surgical history.    Social History   I have reviewed this patient's social history and updated it with pertinent information if needed.  Social History     Tobacco Use     Smoking status: Not on file   Substance Use Topics     Alcohol use: Not on file     Drug use: Not on file       Family History   I have reviewed this patient's family history and updated it with pertinent information if needed.   History reviewed. No pertinent family history.     Prior to Admission Medications   Prior to Admission Medications   Prescriptions Last Dose Informant Patient Reported? Taking?   Acetaminophen (TYLENOL) 325 MG CAPS   Yes Yes   Sig: Take 650 mg by mouth every 6 hours as needed   Camphor-Eucalyptus-Menthol (VAPORIZING CHEST RUB EX)   Yes Yes   LORazepam (ATIVAN) 0.5 MG tablet   Yes  Yes   Sig: Take 0.25 mg by mouth 2 times daily as needed for anxiety   QUEtiapine (SEROQUEL) 50 MG tablet   Yes Yes   Sig: Take 50 mg by mouth 2 times daily   baclofen (LIORESAL) 10 MG tablet   Yes Yes   Sig: Take 10 mg by mouth 2 times daily   bisacodyl ([facility] BISACODYL) 10 MG suppository   Yes Yes   Sig: Place 10 mg rectally daily as needed for constipation   brimonidine (ALPHAGAN) 0.2 % ophthalmic solution   Yes Yes   Sig: Place 1 drop into the right eye 2 times daily   carbidopa-levodopa (SINEMET)  MG tablet   Yes Yes   Sig: Take 2 tablets by mouth 4 times daily   dorzolamide-timolol (COSOPT) 2-0.5 % ophthalmic solution   Yes Yes   Sig: Place 1 drop into the right eye 2 times daily   finasteride (PROSCAR) 5 MG tablet   Yes Yes   Sig: Take 5 mg by mouth daily   gabapentin (NEURONTIN) 300 MG capsule   Yes Yes   Sig: Take 300 mg by mouth 3 times daily   levothyroxine (SYNTHROID/LEVOTHROID) 50 MCG tablet   Yes Yes   Sig: Take 50 mcg by mouth daily   magnesium hydroxide (MILK OF MAGNESIA) 400 MG/5ML suspension   Yes Yes   Sig: Take 15 mLs by mouth daily as needed for constipation or heartburn      Facility-Administered Medications: None     Allergies   No Known Allergies    Physical Exam   Vital Signs: Temp: 97.6  F (36.4  C) Temp src: Temporal BP: (!) 167/128   Heart Rate: 86 Resp: 11 SpO2: 96 % O2 Device: None (Room air)    Weight: 0 lbs 0 oz  HENT: Oropharynx mildly partially visualized since patient refused his mouth.  Eyes: Status post right arm surgery with crystalline implants.  Neck: normal range of motion no JVD..   Cardiovascular: Normal rate, regular rhythm, normal heart sounds.   Pulmonary/Chest: Effort normal and breath sounds normal. No respiratory distress.   Abdominal: Soft. Bowel sounds are normal. No tenderness to palpation. No rebound or guarding.  No bruit.  Musculoskeletal: Pain to minimal mobilization of the left lower extremity.  Pain over the left hip no  hematoma or ecchymosis.   Appreciated.  Neurological: Oriented to self only.  Rigidity of her extremity.  Some tremor bilaterally worse on the right.  Poor cooperation making exam difficult.  Skin: Skin is warm and dry. No rash noted.   Psych: Patient does not cooperate and resisted physical exam.      Data   Data reviewed today: I reviewed all medications, new labs and imaging results over the last 24 hours. I personally reviewed the EKG tracing showing Sinus rythm with bifascicular block with left anterior hemiblock and right bundle.  Early R wave progression in anterior leads..    Recent Labs   Lab 12/15/18  1004   WBC 9.5   HGB 12.3*   MCV 82         POTASSIUM 4.0   CHLORIDE 105   CO2 27   BUN 21   CR 0.81   ANIONGAP 5   THI 8.5   GLC 89     Most Recent 3 CBC's:  Recent Labs   Lab Test 12/15/18  1004   WBC 9.5   HGB 12.3*   MCV 82        Most Recent 3 BMP's:  Recent Labs   Lab Test 12/15/18  1004      POTASSIUM 4.0   CHLORIDE 105   CO2 27   BUN 21   CR 0.81   ANIONGAP 5   THI 8.5   GLC 89     Most Recent 2 LFT's:No lab results found.  Most Recent 3 INR's:No lab results found.  Recent Results (from the past 24 hour(s))   CT Head w/o Contrast    Narrative    CT OF THE HEAD WITHOUT CONTRAST December 15, 2018 11:29 AM     HISTORY: Head trauma, headache.    TECHNIQUE: 5 mm thick axial CT images of the head were acquired  without IV contrast material. Radiation dose for this scan was reduced  using automated exposure control, adjustment of the mA and/or kV  according to patient size, or iterative reconstruction technique.    COMPARISON: None.    FINDINGS: There is moderate diffuse cerebral volume loss. There are  subtle patchy areas of decreased density in the cerebral white matter  bilaterally that are consistent with sequela of chronic small vessel  ischemic disease.     The ventricles and basal cisterns are within normal limits in  configuration given the degree of cerebral volume loss. There is no  midline shift.  There are no extra-axial fluid collections.     No intracranial hemorrhage, mass or recent infarct.    There is mixed density debris in the right maxillary sinus that could  represent sinusitis or hemorrhage within the sinus related to either  epistaxis or fracture of the nonvisualized portion of the right  maxillary sinus. The remaining paranasal sinuses are well aerated.  There is no mastoiditis. There are no fractures of the visualized  bones.       Impression    IMPRESSION: Debris in the right maxillary sinus possibly representing  maxillary sinusitis although hemorrhage from epistaxis or fracture  through the nonvisualized portions of the right maxillary sinus are  also possible. Diffuse cerebral volume loss and cerebral white matter  changes consistent with chronic small vessel ischemic disease. No  evidence for acute intracranial pathology.           CT Cervical Spine w/o Contrast    Narrative    CT OF THE CERVICAL SPINE WITHOUT CONTRAST December 15, 2018 11:29 AM     HISTORY: Neck pain, initial exam; trauma, fall.     TECHNIQUE: Axial images of the cervical spine were acquired without  intravenous contrast. Multiplanar reformations were created. Radiation  dose for this scan was reduced using automated exposure control,  adjustment of the mA and/or kV according to patient size, or iterative  reconstruction technique.     COMPARISON: None.    FINDINGS: There is normal alignment of the cervical vertebrae.  Vertebral body heights of the cervical spine are normal.  Craniocervical alignment is normal. There are no fractures of the  cervical spine. There is degenerative endplate spurring at C5-C6 and  C6-C7 levels. There is minimal facet arthropathy throughout the  cervical spine. There is no degenerative spinal canal narrowing of the  cervical spine. There is no prevertebral soft tissue swelling.      Impression    IMPRESSION: No evidence for fracture or traumatic malalignment of the  cervical spine.       CT  Pelvis Bone wo Contrast    Narrative    CT PELVIS BONE WITHOUT CONTRAST December 15, 2018 11:30 AM     HISTORY: Pelvis trauma, fracture known or suspected, x-ray  insufficient.    TECHNIQUE: Axial images with reconstructions. No IV contrast.  Radiation dose for this scan was reduced using automated exposure  control, adjustment of the mA and/or kV according to patient size, or  iterative reconstruction technique.    COMPARISON: None.    FINDINGS: There is a left femoral neck fracture with displacement,  angulation, and impaction. There is an abdominal aortic aneurysm  measuring 7.1 cm in diameter (only the distal abdominal aorta is  imaged). There is a right common iliac artery aneurysm with a diameter  of 3 cm. There is a left common iliac artery aneurysm with a diameter  of 2.5 cm. Prostate enlargement with impression on the bladder.  Degenerative change of the lower lumbar spine. Bridging hyperostosis  at the anterior aspect of the right sacroiliac joint. Mild left  sacroiliac joint degenerative change.       Impression    IMPRESSION:  1. Left femoral neck fracture.  2. 7.1 cm abdominal aortic aneurysm.  3. 3 cm right and 2.5 cm left common iliac artery aneurysms.  4. Additional findings discussed above.    RAFA ROJAS MD

## 2018-12-15 NOTE — ED PROVIDER NOTES
History     Chief Complaint:    Hip Pain    History limited due to possible dementia. History provided by the patient's relative and patient.     PHYLLIS Turk is a 83 year old male with a history of Parkinson's Disease and AAA, who presents with hip pain via EMS. The patient's relative reports that the patient had a fall on 12/13 that was unwitnessed while at his care facility, [facility]. This morning, he had another fall that was unwitnessed. When his son in law arrived, the patient was in so much pain on his left pain that he was not moveable until EMS arrived and gave him pain medications. Upon arrival, the patient is endorsing having left sided hip pain, neck pain, and hitting his forehead upon falling. He denies rib pain, recent fever, vomiting, or infection. Per his normal, he has diarrhea due to medications. Lastly, then patient's relative details that due to the Parkinson's, the patient's legs sometimes give out.    Allergies:  No known drug allergies.       Medications:    No current medications.     Past Medical History:    Parkinson's Disease  AAA  Basal Cell Carcinoma  Benign prostatic hyperplasia with lower urinary tract symptoms  Dementia with lewy bodies  Glaucoma  Hypothyroidism  Parkinson's Disease    Past Surgical History:    Past surgical history reviewed. No pertinent past surgical history.     Family History:    Family history reviewed. No pertinent family history.     Social History:  The patient was accompanied to the ED by EMS.  Marital Status:        Review of Systems   Constitutional: Negative for fever.   Gastrointestinal: Positive for diarrhea. Negative for vomiting.   Musculoskeletal: Positive for neck pain.        Left hip pain  No rib pain   All other systems reviewed and are negative.    Physical Exam     Patient Vitals for the past 24 hrs:   BP Temp Temp src Heart Rate Resp SpO2   12/15/18 1200 (!) 142/107 -- -- -- -- --   12/15/18 1145 (!) 138/107 -- -- -- --  --   12/15/18 1140 -- -- -- -- -- 98 %   12/15/18 1130 (!) 166/105 -- -- -- -- 96 %   12/15/18 1000 (!) 167/128 -- -- 86 11 96 %   12/15/18 0938 (!) 143/99 97.6  F (36.4  C) Temporal 88 16 96 %      Physical Exam    Vital signs and nursing notes reviewed.     Constitutional: laying on gurney appears uncomfortable  HENT: Oropharynx is clear and moist, no significant facial injury  Eyes: right eyelid margin erythema, chronic right pupillary changes.  Left pupil normal  Neck: reports some discomfort at midline, c-collar in position  Cardiovascular: Normal rate, regular rhythm, normal heart sounds. Strong dorsalis pedis pulses bilaterally  Pulmonary/Chest: Effort normal and breath sounds normal. No respiratory distress.   Abdominal: Soft. Bowel sounds are normal. No tenderness to palpation. No rebound or guarding.   Musculoskeletal: No joint swelling or edema. Upper extremities, and chest wall non tender.  Pain with an ROM of left hip, possible mild shortening  Neurological: Alert and oriented. No focal weakness noted on exam  Skin: Skin is warm and dry. No rash noted.   Psych: normal affect     Emergency Department Course     ECG:  ECG taken at 1008, ECG read at 1010  Normal sinus rhythm  Right bundle branch block  Left anterior fascicular black  bifascicular block  Minimal voltage criteria for LVH, may be normal variant  Septal infarct, age undetermined  Abnormal ECG  Rate 88 bpm. AK interval 202 ms. QRS duration 168 ms. QT/QTc 418/505 ms. P-R-T axes 34 -56 9.    Imaging:  Radiology findings were communicated with the patient who voiced understanding of the findings.    CT Pelvis Bone wo Contrast   Final Result   IMPRESSION:   1. Left femoral neck fracture.   2. 7.1 cm abdominal aortic aneurysm.   3. 3 cm right and 2.5 cm left common iliac artery aneurysms.   4. Additional findings discussed above.      RAFA ROJAS MD      CT Cervical Spine w/o Contrast   Preliminary Result   IMPRESSION: No evidence for fracture or  traumatic malalignment of the   cervical spine.            CT Head w/o Contrast   Preliminary Result   IMPRESSION: Debris in the right maxillary sinus possibly representing   maxillary sinusitis although hemorrhage from epistaxis or fracture   through the nonvisualized portions of the right maxillary sinus are   also possible. Diffuse cerebral volume loss and cerebral white matter   changes consistent with chronic small vessel ischemic disease. No   evidence for acute intracranial pathology.                   Reading per radiology     Laboratory:  Laboratory findings were communicated with the patient who voiced understanding of the findings.    CBC: WBC 9.5, HGB 12.3 (L),   BMP: WNL (Creatinine 0.81)    Interventions:  Medications   fentaNYL (PF) (SUBLIMAZE) injection 50 mcg (not administered)      Emergency Department Course:    0934 I performed an exam of the patient as documented above.     0938 Nursing notes and vitals reviewed.    1004 IV was inserted and blood was drawn for laboratory testing, results above.     1008 EKG obtained as noted above.     1058 The patient was sent for a CT while in the emergency department, results above.      1226 I spoke with Dr. Tony of the orthopedics service from [facility] regarding patient's presentation, findings, and plan of care.     1229 I spoke with Dr. Meade of the hospitalist service from [facility] regarding patient's presentation, findings, and plan of care.      I personally reviewed the imaging, lab, and EKG results with the patient and answered all related questions prior to admission.    Impression & Plan      Medical Decision Making:  Sylvester Turk is a 83 year old male who presents to the emergency department today after sustaining a fall at home resulting in signficant left hip pain. It was also reported that he possibly hit his head and his having some neck discomfort. On head to toe examination, no evidence to assume facial or head  injury, he is in a c-collar, but did have a lot of pain with movement of left hip. CT imaging was obtained of the head and neck, which was negative for acute findings. The patient has no indication of maxillary fracture from the trauma and suspect the findings from the CT are from chronic in nature. His pelvic CT did show findings of a femoral neck fracture and also is noted to have a large intraabdominal aortic aneurysm. family is aware of this and said that they have not talked about doing anything to fix this but have been following it through his physicians. The patient recheck continues to have normal vital signs, he has no other complaints other than left hip pain. I discussed the case with Dr. Tony, from orthopedic service, who is aware of the patient and may take the patient tot he OR later today depending on timing. I also contacted the hospitalist service, who came and evaluated the patient for pre op clearance. I read all the results and the plan with the patient and the family, they understand and are in agreement.     Diagnosis:    ICD-10-CM    1. Closed fracture of neck of left femur, initial encounter (H) S72.002A    2. Abdominal aortic aneurysm (AAA) without rupture (H) I71.4      Disposition:   The patient is admitted into the care of Dr. Meade .     Scribe Disclosure:  I, Orla Severson, am serving as a scribe at 9:38 AM on 12/15/2018 to document services personally performed by Kermit Hernandez MD based on my observations and the provider's statements to me.     [facility] EMERGENCY DEPARTMENT       Kermit Hernandez MD  12/15/18 2722

## 2018-12-15 NOTE — ANESTHESIA PREPROCEDURE EVALUATION
Anesthesia Pre-Procedure Evaluation    Patient: Sylvester Turk   MRN: [id number] : 1935          Preoperative Diagnosis: UNKNOWN    Procedure(s):  OPEN REDUCTION INTERNAL FIXATION HIP LEFT BIPOLAR    Past Medical History:   Diagnosis Date     Abdominal aortic aneurysm (AAA) (H)      Basal cell carcinoma      Benign prostatic hyperplasia with lower urinary tract symptoms      Dementia with Lewy bodies      Glaucoma      Hypothyroidism      Parkinson's disease (H)      History reviewed. No pertinent surgical history.  Anesthesia Evaluation     . Pt has had prior anesthetic. Type: General    No history of anesthetic complications          ROS/MED HX    ENT/Pulmonary:     (+)TERI risk factors snores loudly, , . .   (-) tobacco use, asthma, COPD, sleep apnea, recent URI, Other pulmonary disease and other ENT disease   Neurologic:     (+)dementia, Parkinson's disease    (-) seizures, CVA, TIA, Other neuro hx, Neuropathy and migraines   Cardiovascular:     (+) -Peripheral Vascular Disease (7.1 cm AAA)-- Other, CAD, -past MI (Prbable old septal mi on EKG),-. : . . . :. . Previous cardiac testing date:results:date: results:ECG reviewed date: results:Sinus.  RBBB.Lahb.  Septal MI. date: results:         (-) hypertension, CHF, pacemaker, arrhythmias, dyslipidemia, stent, pacemaker and CABG   METS/Exercise Tolerance:     Hematologic:        (-) anemia   Musculoskeletal:   (+) arthritis, , fracture lower extremity, -       GI/Hepatic:        (-) GERD, hiatal hernia and hepatitis   Renal/Genitourinary:     (+) BPH,    (-) renal disease   Endo:     (+) thyroid problem hypothyroidism, .   (-) Type I DM, Type II DM, chronic steroid usage and obesity   Psychiatric:        (-) psychiatric history   Infectious Disease:  - neg infectious disease ROS       Malignancy:      - no malignancy   Other:    (+) no H/O Chronic Pain,                        Physical Exam  Normal systems: pulmonary and dental    Airway   Mallampati:  III  TM distance: >3 FB  Neck ROM: full    Dental     Cardiovascular   Rhythm and rate: regular and normal  (-) no murmur    Pulmonary    breath sounds clear to auscultation    Other findings: EKG - bifascicular block    Lab Test        12/15/18                       1004          WBC          9.5           HGB          12.3*         MCV          82            PLT          195            Lab Test        12/15/18                       1004          NA           137           POTASSIUM    4.0           CHLORIDE     105           CO2          27            BUN          21            CR           0.81          ANIONGAP     5             THI          8.5           GLC          89                  Lab Results   Component Value Date    WBC 9.5 12/15/2018    HGB 12.3 (L) 12/15/2018    HCT 40.7 12/15/2018     12/15/2018     12/15/2018    POTASSIUM 4.0 12/15/2018    CHLORIDE 105 12/15/2018    CO2 27 12/15/2018    BUN 21 12/15/2018    CR 0.81 12/15/2018    GLC 89 12/15/2018    THI 8.5 12/15/2018       Preop Vitals  BP Readings from Last 3 Encounters:   12/15/18 (!) 179/120    Pulse Readings from Last 3 Encounters:   No data found for Pulse      Resp Readings from Last 3 Encounters:   12/15/18 16    SpO2 Readings from Last 3 Encounters:   12/15/18 96%      Temp Readings from Last 1 Encounters:   12/15/18 98.9  F (37.2  C) (Temporal)    Ht Readings from Last 1 Encounters:   No data found for Ht      Wt Readings from Last 1 Encounters:   No data found for Wt    There is no height or weight on file to calculate BMI.       Anesthesia Plan      History & Physical Review  History and physical reviewed and following examination; no interval change.    ASA Status:  3 .    NPO Status:  > 8 hours    Plan for Spinal     Avoid phenothiazines and Reglan.  DNR/DNI addressed withy family.  ETT OK.  No compressions/ defib, but drug resuscitation OK.  After review of CT scan and aneurysm, Id iscussed with the family doing a SAB  with sedation.  If unable to place SAB, then do GA.      Postoperative Care  Postoperative pain management:  IV analgesics and Oral pain medications.      Consents  Anesthetic plan, risks, benefits and alternatives discussed with:  Spouse and Daughter/Son.  Use of blood products discussed: Yes.   Consented to blood products.  .                 Arpit Angel MD                    .

## 2018-12-15 NOTE — ED NOTES
Bed: ED12  Expected date: 12/15/18  Expected time:   Means of arrival: Ambulance  Comments:  Amie Ceballos

## 2018-12-16 LAB
ANION GAP SERPL CALCULATED.3IONS-SCNC: 6 MMOL/L (ref 3–14)
BUN SERPL-MCNC: 23 MG/DL (ref 7–30)
CALCIUM SERPL-MCNC: 8 MG/DL (ref 8.5–10.1)
CHLORIDE SERPL-SCNC: 105 MMOL/L (ref 94–109)
CO2 SERPL-SCNC: 26 MMOL/L (ref 20–32)
CREAT SERPL-MCNC: 0.79 MG/DL (ref 0.66–1.25)
ERYTHROCYTE [DISTWIDTH] IN BLOOD BY AUTOMATED COUNT: 14 % (ref 10–15)
GFR SERPL CREATININE-BSD FRML MDRD: >90 ML/MIN/1.7M2
GLUCOSE SERPL-MCNC: 100 MG/DL (ref 70–99)
HCT VFR BLD AUTO: 29.8 % (ref 40–53)
HCT VFR BLD AUTO: 31.4 % (ref 40–53)
HCT VFR BLD AUTO: 33.1 % (ref 40–53)
HCT VFR BLD AUTO: 33.5 % (ref 40–53)
HGB BLD-MCNC: 10.4 G/DL (ref 13.3–17.7)
HGB BLD-MCNC: 10.5 G/DL (ref 13.3–17.7)
HGB BLD-MCNC: 9.6 G/DL (ref 13.3–17.7)
HGB BLD-MCNC: 9.7 G/DL (ref 13.3–17.7)
MCH RBC QN AUTO: 25.4 PG (ref 26.5–33)
MCHC RBC AUTO-ENTMCNC: 31.7 G/DL (ref 31.5–36.5)
MCV RBC AUTO: 80 FL (ref 78–100)
PLATELET # BLD AUTO: 189 10E9/L (ref 150–450)
POTASSIUM SERPL-SCNC: 4.1 MMOL/L (ref 3.4–5.3)
RBC # BLD AUTO: 4.13 10E12/L (ref 4.4–5.9)
SODIUM SERPL-SCNC: 137 MMOL/L (ref 133–144)
WBC # BLD AUTO: 11 10E9/L (ref 4–11)

## 2018-12-16 PROCEDURE — 36415 COLL VENOUS BLD VENIPUNCTURE: CPT | Performed by: INTERNAL MEDICINE

## 2018-12-16 PROCEDURE — 25000125 ZZHC RX 250: Performed by: INTERNAL MEDICINE

## 2018-12-16 PROCEDURE — 25000132 ZZH RX MED GY IP 250 OP 250 PS 637: Mod: GY | Performed by: INTERNAL MEDICINE

## 2018-12-16 PROCEDURE — 85014 HEMATOCRIT: CPT | Performed by: INTERNAL MEDICINE

## 2018-12-16 PROCEDURE — 80048 BASIC METABOLIC PNL TOTAL CA: CPT | Performed by: INTERNAL MEDICINE

## 2018-12-16 PROCEDURE — A9270 NON-COVERED ITEM OR SERVICE: HCPCS | Mod: GY | Performed by: INTERNAL MEDICINE

## 2018-12-16 PROCEDURE — 85018 HEMOGLOBIN: CPT | Performed by: INTERNAL MEDICINE

## 2018-12-16 PROCEDURE — 25000132 ZZH RX MED GY IP 250 OP 250 PS 637: Mod: GY | Performed by: PHYSICIAN ASSISTANT

## 2018-12-16 PROCEDURE — 85027 COMPLETE CBC AUTOMATED: CPT | Performed by: INTERNAL MEDICINE

## 2018-12-16 PROCEDURE — 99233 SBSQ HOSP IP/OBS HIGH 50: CPT | Performed by: INTERNAL MEDICINE

## 2018-12-16 PROCEDURE — 25000128 H RX IP 250 OP 636: Performed by: PHYSICIAN ASSISTANT

## 2018-12-16 PROCEDURE — A9270 NON-COVERED ITEM OR SERVICE: HCPCS | Mod: GY | Performed by: PHYSICIAN ASSISTANT

## 2018-12-16 PROCEDURE — 12000007 ZZH R&B INTERMEDIATE

## 2018-12-16 RX ORDER — QUETIAPINE FUMARATE 25 MG/1
25 TABLET, FILM COATED ORAL ONCE
Status: COMPLETED | OUTPATIENT
Start: 2018-12-16 | End: 2018-12-16

## 2018-12-16 RX ADMIN — ACETAMINOPHEN 975 MG: 325 TABLET, FILM COATED ORAL at 00:48

## 2018-12-16 RX ADMIN — QUETIAPINE FUMARATE 50 MG: 50 TABLET ORAL at 08:11

## 2018-12-16 RX ADMIN — Medication 0.5 MG: at 05:12

## 2018-12-16 RX ADMIN — CARBIDOPA AND LEVODOPA 2 TABLET: 25; 100 TABLET, EXTENDED RELEASE ORAL at 08:10

## 2018-12-16 RX ADMIN — Medication 0.5 MG: at 02:06

## 2018-12-16 RX ADMIN — BRIMONIDINE TARTRATE 1 DROP: 2 SOLUTION OPHTHALMIC at 08:24

## 2018-12-16 RX ADMIN — CARBIDOPA AND LEVODOPA 2 TABLET: 25; 100 TABLET, EXTENDED RELEASE ORAL at 12:37

## 2018-12-16 RX ADMIN — BACLOFEN 10 MG: 10 TABLET ORAL at 00:47

## 2018-12-16 RX ADMIN — BRIMONIDINE TARTRATE 1 DROP: 2 SOLUTION OPHTHALMIC at 01:01

## 2018-12-16 RX ADMIN — LATANOPROST 1 DROP: 50 SOLUTION/ DROPS OPHTHALMIC at 19:14

## 2018-12-16 RX ADMIN — CEFAZOLIN SODIUM 1 G: 1 INJECTION, SOLUTION INTRAVENOUS at 12:45

## 2018-12-16 RX ADMIN — FINASTERIDE 5 MG: 5 TABLET, FILM COATED ORAL at 08:11

## 2018-12-16 RX ADMIN — DORZOLAMIDE HYDROCHLORIDE AND TIMOLOL MALEATE 1 DROP: 20; 5 SOLUTION/ DROPS OPHTHALMIC at 17:08

## 2018-12-16 RX ADMIN — CARBIDOPA AND LEVODOPA 2 TABLET: 25; 100 TABLET, EXTENDED RELEASE ORAL at 00:47

## 2018-12-16 RX ADMIN — DORZOLAMIDE HYDROCHLORIDE AND TIMOLOL MALEATE 1 DROP: 20; 5 SOLUTION/ DROPS OPHTHALMIC at 08:21

## 2018-12-16 RX ADMIN — ASPIRIN 162 MG: 81 TABLET, COATED ORAL at 17:07

## 2018-12-16 RX ADMIN — DORZOLAMIDE HYDROCHLORIDE AND TIMOLOL MALEATE 1 DROP: 20; 5 SOLUTION/ DROPS OPHTHALMIC at 00:57

## 2018-12-16 RX ADMIN — TAMSULOSIN HYDROCHLORIDE 0.8 MG: 0.4 CAPSULE ORAL at 22:05

## 2018-12-16 RX ADMIN — BACLOFEN 10 MG: 10 TABLET ORAL at 17:08

## 2018-12-16 RX ADMIN — ASPIRIN 162 MG: 81 TABLET, COATED ORAL at 08:10

## 2018-12-16 RX ADMIN — BACLOFEN 10 MG: 10 TABLET ORAL at 08:11

## 2018-12-16 RX ADMIN — CARBIDOPA AND LEVODOPA 2 TABLET: 25; 100 TABLET, EXTENDED RELEASE ORAL at 16:28

## 2018-12-16 RX ADMIN — LATANOPROST 1 DROP: 50 SOLUTION/ DROPS OPHTHALMIC at 00:51

## 2018-12-16 RX ADMIN — LEVOTHYROXINE SODIUM 50 MCG: 25 TABLET ORAL at 08:10

## 2018-12-16 RX ADMIN — CEFAZOLIN SODIUM 1 G: 1 INJECTION, SOLUTION INTRAVENOUS at 03:48

## 2018-12-16 RX ADMIN — TRAMADOL HYDROCHLORIDE 25 MG: 50 TABLET, FILM COATED ORAL at 12:48

## 2018-12-16 RX ADMIN — ACETAMINOPHEN 975 MG: 325 TABLET, FILM COATED ORAL at 14:43

## 2018-12-16 RX ADMIN — SENNOSIDES AND DOCUSATE SODIUM 1 TABLET: 8.6; 5 TABLET ORAL at 22:03

## 2018-12-16 RX ADMIN — ACETAMINOPHEN 650 MG: 325 TABLET, FILM COATED ORAL at 08:11

## 2018-12-16 RX ADMIN — ACETAMINOPHEN 975 MG: 325 TABLET, FILM COATED ORAL at 22:02

## 2018-12-16 RX ADMIN — TAMSULOSIN HYDROCHLORIDE 0.8 MG: 0.4 CAPSULE ORAL at 00:48

## 2018-12-16 RX ADMIN — CARBIDOPA AND LEVODOPA 2 TABLET: 25; 100 TABLET, EXTENDED RELEASE ORAL at 22:02

## 2018-12-16 RX ADMIN — QUETIAPINE FUMARATE 25 MG: 25 TABLET ORAL at 03:48

## 2018-12-16 RX ADMIN — TRAMADOL HYDROCHLORIDE 25 MG: 50 TABLET, FILM COATED ORAL at 19:07

## 2018-12-16 RX ADMIN — BRIMONIDINE TARTRATE 1 DROP: 2 SOLUTION OPHTHALMIC at 17:06

## 2018-12-16 ASSESSMENT — ACTIVITIES OF DAILY LIVING (ADL)
ADLS_ACUITY_SCORE: 28
ADLS_ACUITY_SCORE: 24
ADLS_ACUITY_SCORE: 24
ADLS_ACUITY_SCORE: 28

## 2018-12-16 NOTE — ANESTHESIA CARE TRANSFER NOTE
Patient: Sylvester Turk    Procedure(s):  OPEN REDUCTION INTERNAL FIXATION HIP LEFT BIPOLAR    Diagnosis: UNKNOWN  Diagnosis Additional Information: No value filed.    Anesthesia Type:   Spinal     Note:  Airway :Room Air  Patient transferred to:PACU  Handoff Report: Identifed the Patient, Identified the Reponsible Provider, Reviewed the pertinent medical history, Discussed the surgical course, Reviewed Intra-OP anesthesia mangement and issues during anesthesia, Set expectations for post-procedure period and Allowed opportunity for questions and acknowledgement of understanding      Vitals: (Last set prior to Anesthesia Care Transfer)    CRNA VITALS  12/15/2018 2041 - 12/15/2018 2120      12/15/2018             EKG:  Sinus rhythm                Electronically Signed By: Dean Dennis Severson, APRN CRNA  December 15, 2018  9:20 PM

## 2018-12-16 NOTE — PROGRESS NOTES
Patient taken to pre-op via stretcher accompanied by his family. Tyler wipes done, hair washed. Home medications kept with patient.

## 2018-12-16 NOTE — PLAN OF CARE
B/p soft this am. MD was notified no new orders. Ivf d'c this am. Pt drinking when offered. Ultram given for pain with good relief. Weight shift as tolerated.pt does shift him self at times. Straight cath for 400 around 0945. Family at bedside updated on poc.  Pt is confused. Sitter at bedside as  pt pulls at tubes. Capnography is on. Pt does have apneic episodes when sleeping. Arouses easily. Sats stable on RA. Did become very sleepy after baclofen and seroquel this am. Attempted to lie pt on right side. Did tolerate for about 5min. Skin intact to back and buttocks. No redness. Family massaged back.

## 2018-12-16 NOTE — OP NOTE
Procedure Date: 12/15/2018      PREOPERATIVE DIAGNOSIS:  Left displaced femoral neck fracture.      POSTOPERATIVE DIAGNOSIS:  Left displaced femoral neck fracture.      PROCEDURE:  Left hip hemiarthroplasty.      SURGEON:  Ronny Tony MD .      ASSISTANT:  Bobby Tony PA-C      ANESTHESIA:  Regional.      ANESTHESIA:  Spinal.      ESTIMATED BLOOD LOSS:  150 mL.      INTRAOPERATIVE COMPLICATIONS:  None apparent.      INDICATIONS:  The patient is an 83-year-old gentleman who sustained an unwitnessed fall on his left side and was noted to have a displaced femoral neck fracture.  Risks, benefits, alternatives to hemiarthroplasty were reviewed at length with the patient and his family and they elected to proceed.      DESCRIPTION OF PROCEDURE:  The patient was identified in the preoperative holding area and the operative site was marked.  The consent was again reviewed.  All questions were answered.  He was taken to the operating room, placed under general anesthesia, positioned in the right lateral decubitus position with all bony prominences well padded with the left lower extremity prepped and draped in the usual sterile fashion.  Preoperative antibiotics administered and timeout called to confirm the correct operative site and procedure.  A longitudinal incision was made along the most posterior border of the trochanter with sharp dissection down through the skin and subcutaneous tissues to the level of the iliotibial band, which was split sharply and the gluteal fascia was split bluntly in line with its fibers.  An East-West retractor was placed and the posterior capsule and short external rotators were taken down as a posterior based flap and tagged for later repair.  The femoral head was excised and sized to a 52.  The neck cut was freshened.  Sequential broaching commenced up to a size 7, at which time the Accolade C 127 degree neck angle cemented hip stem was opened.  After repairing the canal and placing a  distal site and placing a cement restrictor sent, a centralizer was placed on the stem, and the stem was cemented in place with the stem held in appropriate degree of version with slight increase inversion during cement polymerization.  After trialing the +0 head length provided excellent stability and this was therefore opened and seated onto the trunnion.  The hip showed excellent stability in the sleeper position with no subluxation.  The hip was internally rotated 65 degrees prior to subluxation with the hip flexed at 90 degrees.  After a diluted Betadine soak, the wound was irrigated with pulsatile lavage.  A pericapsular anesthetic cocktail was infiltrated.  The short external rotators and capsule were repaired through drill holes in the greater trochanter.  The iliotibial band and gluteal fascia were closed with Vicryl.  The remainder of the wound was closed in layers.  Sterile dressings and an abduction pillow were applied.        The patient was then awoken from general anesthesia and transferred to the postanesthesia care in stable condition.        Low Nowak PA-C was present and scrubbed throughout the case and his assistance was critical for patient positioning, assistance with prepping, draping, soft tissue retraction, leg manipulation, wound closure, dressing and abduction pillow application.         CARSON OLSEN MD             D: 2018   T: 2018   MT: [place]      Name:     DANIEL WHITE   MRN:      [id number]        Account:        [id number]   :      1935           Procedure Date: 12/15/2018      Document: [id number]

## 2018-12-16 NOTE — ANESTHESIA PROCEDURE NOTES
Peripheral nerve/Neuraxial procedure note : intrathecal  Pre-Procedure  Performed by Wilbert Posey MD  Referred by PRETTY  Location: OR      Pre-Anesthestic Checklist: patient identified, IV checked, site marked, risks and benefits discussed, informed consent, monitors and equipment checked, pre-op evaluation, at physician/surgeon's request and post-op pain management    Timeout  Correct Patient: Yes   Correct Procedure: Yes   Correct Site: Yes   Correct Laterality: Yes   Correct Position: Yes   Site Marked: Yes   .   Procedure Documentation  ASA 3  .    Procedure:    Intrathecal.  Insertion Site:L3-4  (midline approach)      Patient Prep;mask, sterile gloves, povidone-iodine 7.5% surgical scrub, patient draped.  .  Needle: Devora tip Spinal Needle (gauge): 22  Spinal/LP Needle Length (inches): 3.5 # of attempts: 2 and # of redirects:  .       Assessment/Narrative  Paresthesias: No.  .  .  clear CSF fluid removed . Time Injected: 20:10

## 2018-12-16 NOTE — BRIEF OP NOTE
[facility]    Brief Operative Note    Pre-operative diagnosis: Left femoral neck frature  Post-operative diagnosis same  Procedure: Procedure(s):  OPEN REDUCTION INTERNAL FIXATION HIP LEFT BIPOLAR  Surgeon: Surgeon(s) and Role:     * Ronny Tony MD - Primary     * Low Noawk PA-C - Assisting  Anesthesia: Spinal   Estimated blood loss: 100  Drains: None  Specimens: * No specimens in log *  Findings:   None.  Complications: None.  See dictated op note        
Charge RN

## 2018-12-16 NOTE — ANESTHESIA POSTPROCEDURE EVALUATION
Patient: Sylvester Turk    Procedure(s):  OPEN REDUCTION INTERNAL FIXATION HIP LEFT BIPOLAR    Diagnosis:UNKNOWN  Diagnosis Additional Information: Left hip fracture    Anesthesia Type:  Spinal    Note:  Anesthesia Post Evaluation    Patient location during evaluation: PACU  Patient participation: Unable to participate in evaluation secondary to underlying medical condition  Level of consciousness: awake  Pain management: adequate  Airway patency: patent  Cardiovascular status: acceptable  Respiratory status: acceptable  Hydration status: acceptable  PONV: controlled     Anesthetic complications: None          Last vitals:  Vitals:    12/15/18 2230 12/15/18 2252 12/15/18 2254   BP: 111/89  112/68   Resp: 11  12   Temp:   98.2  F (36.8  C)   SpO2: 97% 97% 99%         Electronically Signed By: Wilbert Posey MD  December 15, 2018  11:14 PM

## 2018-12-16 NOTE — PLAN OF CARE
"PT: Received orders for evaluation and treatment.  Attempted to work with patient, patient lethargic, minimal ability to wake even with direct stimuli. Unable to participate in therapy session. RN aware. Therapist called patient's spouse and daughter for background information, as patient  with cognitive deficits at baseline.  Family reports that patient has been living at [facility] since Nov 2018, receives assist with all mobility, bathing, toileting, feeding, dressing, medication management.  Per family, patient has been primarily  wheelchair bound for the past 3 years, is able to transfer with assist of 1-2 between bed and chair.  Daughter stated that patient is able to walk approx 5  feet, then his body \"stops moving\" and he will either fall or collapse onto chair/bed.  Daughter stated that patient has a pendant to use to call for assist  with mobility, however patient forgets to use pendant.  Family states that Memory care should be able to provide assist x2 and use of lift at discharge if needed (based on care they have seen other residents receive).  Will reschedule evaluation for a time when patient is able to participate in session and when family is able to be present (to increase participation).  "

## 2018-12-16 NOTE — PROGRESS NOTES
Orthopedic Surgery  Sylvester Turk  2018  Admit Date:  12/15/2018  POD # 1  S/P Left hip hemarthroplasty with  Bipolar    Patient ling in bed comfortabl.  Pain controlled.  Tolerating oral intake.  No events overnight. Denies CP, SOB    Alert and orient to person only.  Vital Sign Ranges  Temperature Temp  Av  F (36.7  C)  Min: 96.9  F (36.1  C)  Max: 99.1  F (37.3  C)   Blood pressure Systolic (24hrs), Av , Min:105 , Max:179        Diastolic (24hrs), Av, Min:67, Max:128      Pulse No Data Recorded   Respirations Resp  Av.3  Min: 9  Max: 30   Pulse oximetry SpO2  Av.5 %  Min: 92 %  Max: 99 %       Left hip dressing is clean, dry, and intact. Minimal erythema of the surrounding skin.  Bilateral calves are soft, non-tender.  Left lower extremity is NVI.  Sensation intact left LE  Able to actively dorsi and plantar flex  2+ DP/PT pulses    Labs:  Recent Labs   Lab Test 18  0649 12/15/18  1004   POTASSIUM 4.1 4.0     Recent Labs   Lab Test 18  0649 18  0036 12/15/18  1004   HGB 10.5* 10.4* 12.3*     No results for input(s): INR in the last 23671 hours.  Recent Labs   Lab Test 18  0649 12/15/18  1004    195       A/P  1. Plan   Continue  BID for DVT prophylaxis.     Mobilize with PT/OT WBAT.     Continue current pain regiment.   Continue hip abductor brace    2. Disposition   Anticipate d/c to with continual PT/OT and medicine team    Low Nowak PA-C

## 2018-12-16 NOTE — CONSULTS
Consult Date:  12/15/2018      REASON FOR CONSULTATION:  Left femoral neck fracture.      HISTORY OF PRESENT ILLNESS:  The patient is an 83-year-old male with history of Parkinson's disease, some mild dementia, abdominal aortic aneurysm and frequent falls who presented to the Emergency Department after an unwitnessed fall in his care facility.  He had significant pain and was not able to perform his usual transfers and therefore was brought to the Emergency Department.  He was noted to have a displaced femoral neck fracture.      PAST MEDICAL HISTORY:   Parkinson's disease, abdominal aortic aneurysm, dementia, hypothyroidism.      MEDICATIONS:  Baclofen, Sinemet, gabapentin, Synthroid, Ativan, Seroquel, Flomax.      ALLERGIES:  NO KNOWN DRUG ALLERGIES.      FAMILY HISTORY:  Reviewed and noncontributory.      SOCIAL HISTORY:  The patient is at [facility].  He is present with his wife and son today.  He does not use tobacco or alcohol.      REVIEW OF SYSTEMS:  Somewhat limited due to the patient receiving recent pain medications and some degree of dementia.      PHYSICAL EXAMINATION:   GENERAL:  The patient is mildly sedated from recent narcotic pain medications.  Much of the history is obtained from the family.   HEENT:  Normocephalic and atraumatic.   NECK:  Supple, nontender breathing regular and nonlabored.   ABDOMEN:  Soft, nontender.   EXTREMITIES:  Left lower extremity, the leg is shortened and externally rotated.  He has pain with any log roll.  Palpable pedal pulses.      IMAGING:  CT scan showed a displaced femoral neck fracture with minimal underlying arthritis.      DIAGNOSIS:  Left displaced femoral neck fracture.        PLAN:  I had a lengthy discussion with the patient and his family regarding management options for his hip fracture.  Although he does mostly do sitting, I feel that this will be best managed with hemiarthroplasty in order to provide pain relief as well as to allow him  to do his usual transfers.  He  does have some increased risks associated with his advanced age, medical comorbidities as well as the fact that he is regularly seated.  He does have increased risk in particular of dislocation and this was reviewed with the family.  Further risks of infection, complications related to anesthesia, ongoing pain, weakness and/or stiffness and potential decline overall in his function were also reviewed.  The family expressed good understanding of the risks associated with surgery and elected to proceed.         CARSON OLSEN MD             D: 2018   T: 2018   MT: JOAO      Name:     DANIEL WHITE   MRN:      [id number]        Account:       [id number]   :      1935           Consult Date:  12/15/2018      Document: [id number]

## 2018-12-16 NOTE — PROGRESS NOTES
"[facility]  Hospitalist Progress Note  Aubree Osborn MD 12/16/2018    Reason for Stay (Diagnosis):L hip fx         Assessment and Plan:      Summary of Stay: Sylvester Turk is a 83 year old male with a history of parkinson's dz complicated by dementia and spasticity, AAA measured at 7.1 cm (without plans for intervention), BPH, hypothyroidism admitted on 12/15/2018 with hip pain after a mechanical fall and found to have a left femoral neck fx, ortho was consulted and took him for left hip hemiarthroplasty that evening.      Overnight into 12/16 his hospitalization has been mostly complicated by ongoing delirium and urinary retention and intermittent lethargy and low bps  Problem List:   1. Hip fx:  S/p ORIF 12/15/18, appreciate ortho input,PT/OT, unfortunately therapy delayed by lethargy and inability to participate.  Lives at memory care, is mostly wheelchair bound (able only to walk a few steps before getting \"frozen).  Plan will be to return to memory care with assist at discharge.   2. Dementia complicated by delirium: at home on quetiapine 50 mg qam which is continued, cont with prn additional doses of quetiapine at bedtime to help maintain sleep/wake cycle, and prn haloperidol  3. Intermittent lethargy ? Due to additional quetiapine overnight along with usual am quetiapine/baclofen, although was wide awake when I saw him earlier this am. Narcotics now all discontinued.  Monitor closely  4. ABL anemia:  Presumably from surgery, no indication for transfusion at this juncture  5. Parkinson's dz:  Continue baseline carbi/levodopa, baclofen for spasticity  6. AAA:  Quite large at 7.1 cm- no plans for intervention   7. BPH with  Urinary retention:  Cont home finasteride and tamsulosin to minimize risk of urinary retention in setting of narcotics-although in setting of delirium has refused his pills periodically, now with e/o retention by bladder scan, will ask for bid pvrs with instructions for straight " cath/de paz based on results.  Would err on side of no de paz given concern for agitation  8. Hypothyroidism:  Cont replacement as at home  DVT Prophylaxis: Pneumatic Compression Devices  Code Status: DNR / DNI  Functional status:  Lives in memory care, predominantly sits, gets around by wheelchair, assisst 1-2 for all cares    Disposition Plan   Expected discharge in 1-2 days to prior living arrangement once more consistently awake, urinary retention issue resolved and pain under control off narcotics.     Entered: Aubree Osborn 12/16/2018, 8:38 AM               Interval History (Subjective):      This am states he was feeling fine denied cp or sob, or any pain for that matter.  Other intermittently cooperative with RN in taking pills.  No n/v, no abdominal  Pain                   Physical Exam:      Last Vital Signs:  /73   Temp 96.9  F (36.1  C) (Axillary)   Resp 9   SpO2 99%     I/O:even-although no Output documented.   Tele:  NSR with RBBB    Initially quite alert and sitting up in bed, bradykinetic nad looks stated age head nc/at sclera clear lungs ctab nl effort rrr no mrg no le edema skin w/d no c/c abd s/nt/nd affect pleasant, alert not oriented    Called for hypotension/lethargy-iin bed, sleeping, nad, arouses to touch and voice but doesn't stay awake, BPs improving without intervention            Medications:      All current medications were reviewed with changes reflected in problem list.         Data:      All new lab and imaging data was reviewed.   Labs:  Recent Labs   Lab 12/16/18  0649      POTASSIUM 4.1   CHLORIDE 105   CO2 26   ANIONGAP 6   *   BUN 23   CR 0.79   GFRESTIMATED >90   GFRESTBLACK >90   THI 8.0*     Recent Labs   Lab 12/16/18  0649   WBC 11.0   HGB 10.5*   HCT 33.1*   MCV 80        Recent Labs   Lab 12/16/18  0649 12/15/18  1004   * 89      Imaging:

## 2018-12-16 NOTE — PROGRESS NOTES
Arrived to room (603 ) from ER at (1540 ) via cart, alert and oriented x 1, oriented to room and call system, IV patent saline locked,complaining of pain and moaning, reviewed welcome folder and pain/medication information packet with family. Admission profile started.

## 2018-12-17 ENCOUNTER — APPOINTMENT (OUTPATIENT)
Dept: PHYSICAL THERAPY | Facility: CLINIC | Age: 83
DRG: 470 | End: 2018-12-17
Payer: MEDICARE

## 2018-12-17 LAB
ANION GAP SERPL CALCULATED.3IONS-SCNC: 8 MMOL/L (ref 3–14)
BASOPHILS # BLD AUTO: 0 10E9/L (ref 0–0.2)
BASOPHILS NFR BLD AUTO: 0.4 %
BUN SERPL-MCNC: 30 MG/DL (ref 7–30)
CALCIUM SERPL-MCNC: 7.9 MG/DL (ref 8.5–10.1)
CHLORIDE SERPL-SCNC: 104 MMOL/L (ref 94–109)
CO2 SERPL-SCNC: 25 MMOL/L (ref 20–32)
CREAT SERPL-MCNC: 0.84 MG/DL (ref 0.66–1.25)
DIFFERENTIAL METHOD BLD: ABNORMAL
EOSINOPHIL # BLD AUTO: 0.1 10E9/L (ref 0–0.7)
EOSINOPHIL NFR BLD AUTO: 1.4 %
ERYTHROCYTE [DISTWIDTH] IN BLOOD BY AUTOMATED COUNT: 14 % (ref 10–15)
GFR SERPL CREATININE-BSD FRML MDRD: 88 ML/MIN/1.7M2
GLUCOSE SERPL-MCNC: 100 MG/DL (ref 70–99)
HCT VFR BLD AUTO: 30.2 % (ref 40–53)
HGB BLD-MCNC: 9.5 G/DL (ref 13.3–17.7)
IMM GRANULOCYTES # BLD: 0.1 10E9/L (ref 0–0.4)
IMM GRANULOCYTES NFR BLD: 0.6 %
INTERPRETATION ECG - MUSE: NORMAL
LYMPHOCYTES # BLD AUTO: 1.2 10E9/L (ref 0.8–5.3)
LYMPHOCYTES NFR BLD AUTO: 13.8 %
MCH RBC QN AUTO: 25.7 PG (ref 26.5–33)
MCHC RBC AUTO-ENTMCNC: 31.5 G/DL (ref 31.5–36.5)
MCV RBC AUTO: 82 FL (ref 78–100)
MONOCYTES # BLD AUTO: 0.9 10E9/L (ref 0–1.3)
MONOCYTES NFR BLD AUTO: 11.1 %
NEUTROPHILS # BLD AUTO: 6.1 10E9/L (ref 1.6–8.3)
NEUTROPHILS NFR BLD AUTO: 72.7 %
NRBC # BLD AUTO: 0 10*3/UL
NRBC BLD AUTO-RTO: 0 /100
PLATELET # BLD AUTO: 170 10E9/L (ref 150–450)
POTASSIUM SERPL-SCNC: 3.8 MMOL/L (ref 3.4–5.3)
RBC # BLD AUTO: 3.7 10E12/L (ref 4.4–5.9)
SODIUM SERPL-SCNC: 137 MMOL/L (ref 133–144)
WBC # BLD AUTO: 8.4 10E9/L (ref 4–11)

## 2018-12-17 PROCEDURE — 40000193 ZZH STATISTIC PT WARD VISIT

## 2018-12-17 PROCEDURE — 85025 COMPLETE CBC W/AUTO DIFF WBC: CPT | Performed by: INTERNAL MEDICINE

## 2018-12-17 PROCEDURE — 97530 THERAPEUTIC ACTIVITIES: CPT | Mod: GP

## 2018-12-17 PROCEDURE — 97161 PT EVAL LOW COMPLEX 20 MIN: CPT | Mod: GP

## 2018-12-17 PROCEDURE — 12000007 ZZH R&B INTERMEDIATE

## 2018-12-17 PROCEDURE — A9270 NON-COVERED ITEM OR SERVICE: HCPCS | Mod: GY | Performed by: INTERNAL MEDICINE

## 2018-12-17 PROCEDURE — 40000894 ZZH STATISTIC OT IP EVAL DEFER

## 2018-12-17 PROCEDURE — 25000132 ZZH RX MED GY IP 250 OP 250 PS 637: Mod: GY | Performed by: PHYSICIAN ASSISTANT

## 2018-12-17 PROCEDURE — A9270 NON-COVERED ITEM OR SERVICE: HCPCS | Mod: GY | Performed by: PHYSICIAN ASSISTANT

## 2018-12-17 PROCEDURE — 99233 SBSQ HOSP IP/OBS HIGH 50: CPT | Performed by: INTERNAL MEDICINE

## 2018-12-17 PROCEDURE — 36415 COLL VENOUS BLD VENIPUNCTURE: CPT | Performed by: INTERNAL MEDICINE

## 2018-12-17 PROCEDURE — 80048 BASIC METABOLIC PNL TOTAL CA: CPT | Performed by: INTERNAL MEDICINE

## 2018-12-17 PROCEDURE — 25000132 ZZH RX MED GY IP 250 OP 250 PS 637: Mod: GY | Performed by: INTERNAL MEDICINE

## 2018-12-17 RX ORDER — TRAMADOL HYDROCHLORIDE 50 MG/1
25 TABLET ORAL EVERY 4 HOURS PRN
Qty: 30 TABLET | Refills: 0 | Status: SHIPPED | OUTPATIENT
Start: 2018-12-17 | End: 2018-12-18

## 2018-12-17 RX ORDER — AMOXICILLIN 250 MG
2 CAPSULE ORAL 2 TIMES DAILY
Qty: 30 TABLET | Refills: 0 | Status: ON HOLD | OUTPATIENT
Start: 2018-12-17 | End: 2019-08-31

## 2018-12-17 RX ORDER — ACETAMINOPHEN 325 MG/1
975 TABLET ORAL EVERY 8 HOURS
Qty: 60 TABLET | Refills: 0 | Status: ON HOLD | OUTPATIENT
Start: 2018-12-17 | End: 2019-08-31

## 2018-12-17 RX ADMIN — QUETIAPINE FUMARATE 50 MG: 50 TABLET ORAL at 08:09

## 2018-12-17 RX ADMIN — BRIMONIDINE TARTRATE 1 DROP: 2 SOLUTION OPHTHALMIC at 08:38

## 2018-12-17 RX ADMIN — DOCUSATE SODIUM 100 MG: 100 CAPSULE, LIQUID FILLED ORAL at 21:52

## 2018-12-17 RX ADMIN — ACETAMINOPHEN 975 MG: 325 TABLET, FILM COATED ORAL at 16:36

## 2018-12-17 RX ADMIN — ASPIRIN 162 MG: 81 TABLET, COATED ORAL at 08:09

## 2018-12-17 RX ADMIN — TRAMADOL HYDROCHLORIDE 25 MG: 50 TABLET, FILM COATED ORAL at 03:58

## 2018-12-17 RX ADMIN — CARBIDOPA AND LEVODOPA 2 TABLET: 25; 100 TABLET, EXTENDED RELEASE ORAL at 21:52

## 2018-12-17 RX ADMIN — LATANOPROST 1 DROP: 50 SOLUTION/ DROPS OPHTHALMIC at 19:16

## 2018-12-17 RX ADMIN — ACETAMINOPHEN 975 MG: 325 TABLET, FILM COATED ORAL at 09:38

## 2018-12-17 RX ADMIN — BRIMONIDINE TARTRATE 1 DROP: 2 SOLUTION OPHTHALMIC at 16:36

## 2018-12-17 RX ADMIN — CARBIDOPA AND LEVODOPA 2 TABLET: 25; 100 TABLET, EXTENDED RELEASE ORAL at 03:55

## 2018-12-17 RX ADMIN — DORZOLAMIDE HYDROCHLORIDE AND TIMOLOL MALEATE 1 DROP: 20; 5 SOLUTION/ DROPS OPHTHALMIC at 16:36

## 2018-12-17 RX ADMIN — TRAMADOL HYDROCHLORIDE 25 MG: 50 TABLET, FILM COATED ORAL at 15:18

## 2018-12-17 RX ADMIN — DORZOLAMIDE HYDROCHLORIDE AND TIMOLOL MALEATE 1 DROP: 20; 5 SOLUTION/ DROPS OPHTHALMIC at 08:09

## 2018-12-17 RX ADMIN — LEVOTHYROXINE SODIUM 50 MCG: 25 TABLET ORAL at 09:40

## 2018-12-17 RX ADMIN — CARBIDOPA AND LEVODOPA 2 TABLET: 25; 100 TABLET, EXTENDED RELEASE ORAL at 13:59

## 2018-12-17 RX ADMIN — BACLOFEN 10 MG: 10 TABLET ORAL at 16:36

## 2018-12-17 RX ADMIN — ACETAMINOPHEN 975 MG: 325 TABLET, FILM COATED ORAL at 23:41

## 2018-12-17 RX ADMIN — SENNOSIDES AND DOCUSATE SODIUM 1 TABLET: 8.6; 5 TABLET ORAL at 08:08

## 2018-12-17 RX ADMIN — BACLOFEN 10 MG: 10 TABLET ORAL at 09:38

## 2018-12-17 RX ADMIN — FINASTERIDE 5 MG: 5 TABLET, FILM COATED ORAL at 09:40

## 2018-12-17 RX ADMIN — CARBIDOPA AND LEVODOPA 2 TABLET: 25; 100 TABLET, EXTENDED RELEASE ORAL at 09:39

## 2018-12-17 ASSESSMENT — ACTIVITIES OF DAILY LIVING (ADL)
ADLS_ACUITY_SCORE: 28
ADLS_ACUITY_SCORE: 32
ADLS_ACUITY_SCORE: 28
ADLS_ACUITY_SCORE: 19
ADLS_ACUITY_SCORE: 17
ADLS_ACUITY_SCORE: 28

## 2018-12-17 NOTE — PLAN OF CARE
VS stable. Disoriented x 4. At times patient does say his name/oriented to self/family. Patient answers  yes/no questions.   Tele:sinus rhythm with BBB.   Diet: regular.   Ambulates: patient was on bedrest this evening with weight  shifting assistance as tolerated. PT was unable to see the patient today  on day shift due to lethargy. Did not feel comfortable having patient up for the first time post surgery due to his baseline requiring 2 people to assist and a shuffled gait. Provided weight shift assistance. Unable to do q2 turns due to hip and abductor pillow, would have left the patient not aligned.    IV meds: saline locked.   Pain:patient at times able to verbalize pain 2/10. Ultram given q 4hours. Ice applied. Hip abductor pillow in place.    Abnormal labs: hgb 9.7.   Abnormal assessments: some bruising on legs. Patients home meds were reviewed tonight with family and pharmacy to adjust times to family home schedule. Patient at times agitated and yelling out. However has been able to rest most of the evening and has been redirectable. Patient refused capnography earlier in shift as he continued to pull it off. Pulse ox kept in place to monitor spo2. Left hip dressing with minimal drainage noted underneath the bandage    Intake & output: patient unable to void, encouraging fluids and will continue to monitor with bladder  scan. Last bladder scan amount was 150.    Discharge plan: not yet in place, surgery following. PT to see patient tomorrow. Will continue to monitor and review plan of care.

## 2018-12-17 NOTE — PROGRESS NOTES
"   12/17/18 1200   Quick Adds   Type of Visit Initial PT Evaluation   Living Environment   Lives With facility resident   Living Arrangements assisted living  ([facility])   Self-Care   Usual Activity Tolerance poor   Current Activity Tolerance poor   Activity/Exercise/Self-Care Comment Family reports that patient has been living at [facility] since Nov 2018, receives assist with all mobility, bathing, toileting, feeding, dressing, medication management.  Per family, patient has been primarily  wheelchair bound for the past 3 years, is able to transfer with assist of 1-2 between bed and chair.  Daughter stated that patient is able to walk approx 5  feet, then his body \"stops moving\" and he will either fall or collapse onto chair/bed.   Functional Level Prior   Ambulation 3-->assistive equipment and person   Transferring 3-->assistive equipment and person   General Information   Onset of Illness/Injury or Date of Surgery - Date 12/15/18   Referring Physician She LEBLANC   Patient/Family Goals Statement Not able to state.    Pertinent History of Current Problem (include personal factors and/or comorbidities that impact the POC) 83 year old male s/p L hip hemiarthroplasty.    Precautions/Limitations fall precautions;left hip precautions  (Posterior or P/L precautions)   Weight-Bearing Status - LLE weight-bearing as tolerated   Cognitive Status Examination   Orientation not oriented to person, place or time   Level of Consciousness alert;confused   Follows Commands and Answers Questions able to follow single-step instructions;50% of the time  (Needs tactile assist to perform mobility)   Pain Assessment   Patient Currently in Pain No  (No nonverbal signs of pain this session)   Range of Motion (ROM)   ROM Comment Decreased bilateral LE AROM. PROM WFLs; however pt resisting.    Strength   Strength Comments Decreased generally; needs physical assist as noted with mobility below.    Bed Mobility   Bed Mobility Comments " "Supine to sit with max A x 2, HOB elevated   Transfer Skills   Transfer Comments Sit to/from stand with min A x 2.    Gait   Gait Comments Not able to progress to functional gait   Balance   Balance Comments Good static seated balance. Fair static standing balance with bilateral UE support on FWW   Coordination   Coordination other (see comments)   Coordination Comments Needs assist to place UEs and to move each LE when given motor commands   Modality Interventions   Planned Modality Interventions Cryotherapy   Planned Modality Interventions Comments PRN   General Therapy Interventions   Planned Therapy Interventions bed mobility training;gait training;ROM;strengthening;transfer training;progressive activity/exercise   Clinical Impression   Criteria for Skilled Therapeutic Intervention yes, treatment indicated   PT Diagnosis Impaired gait   Influenced by the following impairments Decreased activity tolerance, decreased strength globally (decreased L LE also), impaired balance, impaired cognition.    Functional limitations due to impairments Decreased independence with functional mobility   Clinical Presentation Stable/Uncomplicated   Clinical Presentation Rationale Stable.    Clinical Decision Making (Complexity) Low complexity   Therapy Frequency` 5 times/week   Predicted Duration of Therapy Intervention (days/wks) One week   Anticipated Discharge Disposition Long Term Care Facility  (Ax2 for mobility and lift)   Risk & Benefits of therapy have been explained Yes   Patient, Family & other staff in agreement with plan of care Yes   [facility] eyefactive TM \"6 Clicks\"   2016, Trustees of [facility], under license to [employer].  All rights reserved.   6 Clicks Short Forms Basic Mobility Inpatient Short Form   [facility] [employer]  \"6 Clicks\" V.2 Basic Mobility Inpatient Short Form   1. Turning from your back to your side while in a flat bed without using bedrails? 2 - A Lot   2. Moving from lying on " your back to sitting on the side of a flat bed without using bedrails? 2 - A Lot   3. Moving to and from a bed to a chair (including a wheelchair)? 1 - Total   4. Standing up from a chair using your arms (e.g., wheelchair, or bedside chair)? 2 - A Lot   5. To walk in hospital room? 1 - Total   6. Climbing 3-5 steps with a railing? 1 - Total   Basic Mobility Raw Score (Score out of 24.Lower scores equate to lower levels of function) 9   Total Evaluation Time   Total Evaluation Time (Minutes) 5

## 2018-12-17 NOTE — PLAN OF CARE
Vss. Pain controlled with sched tylenol cms to LLE intact. Ultram for breakthrough pain. Up to chair with PT. Dressing cdi. Skin intact. Incont. X2. Pvr 200 this am. Pcds on. Heels elevated. Weight shift assissted as tolerated per pt. Does not  tolerate side lying for long. Abducter pillow between legs when in bed.

## 2018-12-17 NOTE — CONSULTS
"CLINICAL NUTRITION SERVICES  -  ASSESSMENT NOTE      RECOMMENDATIONS FOR MD/PROVIDER TO ORDER: N/A   Recommendations Ordered by Registered Dietitian (RD):  -Obtain wt  -Provide Boost supplement with meals TID.   Future/Additional Recommendations: N/A   MALNUTRITION:  % Weight Loss: Unable to determine  % Intake:  Decreased intake does not meet criteria for malnutrition. Eating less than 50% of meals since admission.   Subcutaneous Fat Loss:  None observed  Muscle Loss:  Temporal region mild depletion, Clavicle bone region mild depletion and Acromion bone region mild depletion. Didn't observe LE.  Fluid Retention:  None noted    Malnutrition Diagnosis: Unable to determine due to lack of admit weight.        REASON FOR ASSESSMENT  Sylvester Turk is a 83 year old male seen by Registered Dietitian for Provider Order - Suspect associated malnutrition in a patient requiring surgery.       NUTRITION HISTORY  -Information obtained from patient's wife and chart.   -PMH of dementia and Parkinson's Disease.  -Patient admitted d/t fall resulting in L femoral neck fracture.   -Lives in memory care.  -Patient is on a regular diet at home.  -Meals offered TID by facility. Eats more than 50% of meals at baseline. Recently moved to a new facility and is eating more than at previous facility.  -Wife denies chewing or swallowing issues. No dentures.   -Doesn't take supplements at baseline.   -Requires feeding assistance.     CURRENT NUTRITION ORDERS  Diet Order:     Regular     Current Intake/Tolerance:  -Diet advanced on (12/16/18)  -Lethargy   -Self-feeding  -Post-op pain   -All above are potential barriers PO intake.  -Lunch tray was in the room, not interested in eating currently     PHYSICAL FINDINGS  Observed   -Muscle loss, see below   Obtained from Chart/Interdisciplinary Team  -ORIF of L hip (12/15/18)    ANTHROPOMETRICS  Height: 6' 2\"  Weight: 0 lbs 0 oz-->wife reports most recent at 180#  BMI: unable to calculate.  Weight " Status: unable to calculate   IBW: 190# (86 kg)  % IBW: unable to calculate   Weight History: Wife stated stability in weight for the past 6 months, but previously had been losing weight for the past 5 years.    Wt Readings from Last 10 Encounters:   No data found for Wt     LABS  Labs reviewed    MEDICATIONS  Medications reviewed      ASSESSED NUTRITION NEEDS PER APPROVED PRACTICE GUIDELINES:    Dosing Weight 86 kg using ideal until admit weight obtained   Estimated Energy Needs: 7794-4351  kcals (25-30 Kcal/Kg)  Justification: maintenance  Estimated Protein Needs: 103-129 grams protein (1.2-1.5 g pro/Kg)  Justification: post-op and preservation of lean body mass  Estimated Fluid Needs: Per MD  Justification: Per Md    MALNUTRITION:  % Weight Loss: Unable to determine  % Intake:  Decreased intake does not meet criteria for malnutrition. Eating less than 50% of meals since admission.   Subcutaneous Fat Loss:  None observed  Muscle Loss:  Temporal region mild depletion, Clavicle bone region mild depletion and Acromion bone region mild depletion. Didn't observe LE.  Fluid Retention:  None noted    Malnutrition Diagnosis: Unable to determine due to lack of admit weight.    NUTRITION DIAGNOSIS:  Inadequate oral intake related to decreased appetite d/t lethargy, self-feeding, post-op pain as evidenced by PO intake less than 50% of meals since admission.     NUTRITION INTERVENTIONS  Recommendations / Nutrition Prescription  -Obtain wt  -Provide Boost supplement with meals TID.  -Continue regular diet.    Implementation  -Nutrition education: Provided education on offering Boost with meals TID.  -Medical Food Supplement and Collaboration and Referral of Nutrition care    Nutrition Goals  -Consume 50% of meals TID, or the equivalent with high protein supplements.    MONITORING AND EVALUATION:  Progress towards goals will be monitored and evaluated per protocol and Practice Guidelines    [facility]  Dietetic  Intern

## 2018-12-17 NOTE — PLAN OF CARE
Discharge Planner OT   Patient plan for discharge: -  Current status: order received. Per PT, pt lives at memory care unit and is assisted with mobility and ADL's. Pt currently requiring A x 2. Will defer OT evaluation at this time as pt has A as memory care unit. PT to address mobility and transfers at this time.   Barriers to return to prior living situation: defer to PT   Recommendations for discharge: defer to PT   Rationale for recommendations: will defer OT evaluation at this time due to currently requiring A x 2. Pt has A from facility with ADL's and transfers. PT to address mobility and transfers during inpatient stay.        Entered by: Bronwyn Romo 12/17/2018 12:16 PM

## 2018-12-17 NOTE — PLAN OF CARE
"PT: Orders received, evaluation completed and treatment initiated.  83 year old male s/p L hip hemiarthroplasty. Family reports that patient has been living at [facility] since Nov 2018, receives assist with all mobility, bathing, toileting, feeding, dressing, medication management.  Per family, patient has been primarily  wheelchair bound for the past 3 years, is able to transfer with assist of 1-2 between bed and chair.  Daughter stated that patient is able to walk approx 5  feet, then his body \"stops moving\" and he will either fall or collapse onto chair/bed.    Discharge Planner PT   Patient plan for discharge: Family is open to recommendations  Current status: Rolling bilaterally in bed with max A x 1-2. Supine to sit with max A x 2, HOB elevated. Good static seated balance. STS to FWW and Caroline Steady with min A x 2. Pt able to stand statically in FWW with CGA x 2. Not able to progress to functional steps. Bed to chair via Caroline Steady. Mod A x 2 to stand from low recliner chair within precautions. Sitter present. Education to family on hip precautions. Discussed how this would effect cares at facility. Handouts with precautions were provided.   Barriers to return to prior living situation: Will need a lift, will need assist with all mobility due to hip precautions and weakness.   Recommendations for discharge: Return to [facility] with Ax2 for mobility (likely would need an EZ stand or mechanical lift) with HHPT.   Rationale for recommendations: Pt needing assist and constant cues to maintain hip precautions with mobility. Would benefit from HHPT to return to [facility]. Family reports they believe pt can receive Ax2 at his current facility.        Entered by: Edgard Romero 12/17/2018 1:04 PM       "

## 2018-12-17 NOTE — PROGRESS NOTES
"[facility]  Hospitalist Progress Note  Aubree Osborn MD 12/17/2018    Reason for Stay (Diagnosis):L hip fx         Assessment and Plan:      Summary of Stay: Sylvester Turk is a 83 year old male with a history of parkinson's dz complicated by dementia and spasticity, AAA measured at 7.1 cm (without plans for intervention), BPH, hypothyroidism admitted on 12/15/2018 with hip pain after a mechanical fall and found to have a left femoral neck fx, ortho was consulted and took him for left hip hemiarthroplasty that evening.      Overnight into 12/16 his hospitalization has been mostly complicated by delirium and urinary retention and intermittent lethargy.    Urinary retention has improved, but intermittent agitation remains an issue  Problem List:   1. Hip fx:  S/p ORIF 12/15/18, appreciate ortho input,PT/OT, unfortunately therapy delayed by lethargy and inability to participate.  Lives at memory care, is mostly wheelchair bound (able only to walk a few steps before getting \"frozen).  Plan will be to return to memory care with assist at discharge provided they can meet his needs.  Had long discussion with wife and son in law at the bedside, there is no perfect place for discharge-as he's not really rehabable based on prior performance and going to TCU is only likely to confuse him more, he's at risk of further falls at memory care-but they can supplement coverage on their own if financially feasible.  SW and family are working together to come up with a plan that will fit his needs the best   2. Dementia complicated by delirium: at home on quetiapine 50 mg qam which is continued, cont with prn additional doses of quetiapine at bedtime to help maintain sleep/wake cycle, and prn haloperidol  3. Intermittent lethargy ? Due to additional quetiapine overnight along with usual am quetiapine/baclofen, although was wide awake when I saw him earlier this am. Narcotics now all discontinued.  Monitor closely  4. ABL " "anemia:  Presumably from surgery, no indication for transfusion at this juncture  5. Parkinson's dz:  Continue baseline carbi/levodopa, baclofen for spasticity  6. AAA:  Quite large at 7.1 cm- no plans for intervention   7. BPH with  Urinary retention:  Cont home finasteride and tamsulosin to minimize risk of urinary retention in setting of narcotics-although in setting of delirium has refused his pills periodically, now with e/o retention by bladder scan, will ask for bid pvrs with instructions for straight cath/de paz based on results.  Would err on side of no de paz given concern for agitation  8. Hypothyroidism:  Cont replacement as at home  DVT Prophylaxis: Pneumatic Compression Devices  Code Status: DNR / DNI  Functional status:  Lives in memory care, predominantly sits, gets around by wheelchair, assisst 1-2 for all cares    Disposition Plan   Expected discharge in 1 days to prior living arrangement once more consistently awake, urinary retention issue resolved and pain under control off narcotics.    Had long discussion with wife and son in law at the bedside, there is no perfect place for discharge-as he's not really rehabable based on prior performance and going to TCU is only likely to confuse him more, he's at risk of further falls at memory care-but they can supplement coverage on their own if financially feasible.  SW and family are working together to come up with a plan that will fit his needs the best      Entered: Aubree Osborn 12/17/2018, 5:22 PM               Interval History (Subjective):      Another rough nite with confusion.  This am appears comfortable but not wanting to converse with me.  No cp or sob.  Poor po intake                   Physical Exam:      Last Vital Signs:  /69 (BP Location: Left arm)   Temp 96.9  F (36.1  C) (Axillary)   Resp 16   Ht 1.88 m (6' 2\")   SpO2 100%     I/O:even-although no Output documented.   Tele:  NSR with [facility]BBB    Laying in bed nad bradykinetic nad " looks stated age head nc/at sclera clear lungs ctab nl effort rrr no mrg no le edema skin w/d no c/c abd s/nt/nd affect pleasant, alert not oriented             Medications:      All current medications were reviewed with changes reflected in problem list.         Data:      All new lab and imaging data was reviewed.   Labs:  Recent Labs   Lab 12/17/18  0808      POTASSIUM 3.8   CHLORIDE 104   CO2 25   ANIONGAP 8   *   BUN 30   CR 0.84   GFRESTIMATED 88   GFRESTBLACK >90   THI 7.9*     Recent Labs   Lab 12/17/18  0808   WBC 8.4   HGB 9.5*   HCT 30.2*   MCV 82        Recent Labs   Lab 12/17/18  0808 12/16/18  0649 12/15/18  1004   * 100* 89      Imaging:

## 2018-12-17 NOTE — CONSULTS
Care Transition Initial Assessment - DREAD  Reason For Consult: discharge planning  Met with: Patient and Family (Pt's spouse Mary Ellen and son-in-law Tong)    Active Problems:    Fracture of hip, left, closed, initial encounter (H)    Hip fracture requiring operative repair (H)       DATA  Lives With: facility resident - [facility]  Living Arrangements: assisted living(Memory Care)  Quality of Family Relationships: involved, helpful, supportive  Description of Support System: Supportive, Involved    Who is your support system?: Wife - Mary Ellen, Children - Dtr Dot and her  Tong ([place]).  Support Assessment: Adequate family and caregiver support. Tong or Dtr Dot are with the pt and Wave all the time for assistance here.  Identified issues/concerns regarding health management: Pt was admitted for Fracture of hip, left, closed.        Quality of Family Relationships: involved, helpful, supportive  Transportation Anticipated: health plan transportation - Mary Ellen states HE stretcher transport - is aware and acknowledges the costs.    ASSESSMENT  Cognitive Status:  disoriented and confused  Concerns to be addressed: Sw spoke with Mary Ellen and Tong due to the pt being unable to follow conversation and answer.  He was able to answer a couple of short questions, but then he would mumble words that did not go along with the conversation.  Mary Ellen said that in the last 15 months, the pt has fallen about 7 times.  Pt was at a nursing home in [place] for 15 months, but they moved him here due to them not liking the facility and more family is near [facility].  Mary Ellen still lives in [place], but stays here for a few days then drives back and forth.  Mary Ellen said that over the last 3 weeks she has really noticed a decline in the pt in regards to his irritation and mumbling.  Pt currently uses a standard wheelchair, but he leans forward in it so they do not have him in his wheelchair that much.  [facility]   [facility] at [facility] to [facility] they can get him a tall back wheelchair that reclines to help with feeding and so that he does not have to be in a recliner or bed all of the time for safety.  Family is going to look into this.    Jeremy reached out to Halle at [facility] ([phone]), but had to leave a message - jeremy is still waiting for a call back to identify services and discharge plans.     PLAN  Patient anticipates discharging back to [facility].  Jeremy will continue with discharge planning and will be available as needed until discharge.  Jeremy will follow-up with [facility] again tomorrow.

## 2018-12-17 NOTE — PLAN OF CARE
Orientation: lethargic at times. Answered yes and no questions appropriately. Disoriented x4. Agitated at times. Swinging at staff, pulling at lines. Mitts placed at 0420.   HEENT: small amount of clear drainage noted from right eye with associated redness and minimal swelling. Pt noted to be keep this eye closed majority of shift and rubbing at eye at times. Eye wiped with warm washcloth PRN.   VSS. % on RA. Afebrile.   Tele: SR. HR 70s.   LS: diminished but clear throughout.   GI: Passing gas. no BM. Denies N/V.   : incontinent of urine. PVR of 253 ml.   Skin: bruises and scabs noted throughout. Incision to left hip. Dressing in place. Scant amount of dried drainge.   Activity: Total assist/lift. Weight shift changes completed overnight. Hip abductor pillow in place. Pt slept off and on throughout shift.   Pain: 6/10 PAINAD. Ultram x1. Ice to hip throughout shift  Plan: Continue with current cares.

## 2018-12-18 VITALS
DIASTOLIC BLOOD PRESSURE: 67 MMHG | TEMPERATURE: 96.6 F | HEIGHT: 74 IN | RESPIRATION RATE: 20 BRPM | OXYGEN SATURATION: 98 % | SYSTOLIC BLOOD PRESSURE: 111 MMHG

## 2018-12-18 LAB
ANION GAP SERPL CALCULATED.3IONS-SCNC: 8 MMOL/L (ref 3–14)
BASOPHILS # BLD AUTO: 0 10E9/L (ref 0–0.2)
BASOPHILS NFR BLD AUTO: 0.5 %
BUN SERPL-MCNC: 31 MG/DL (ref 7–30)
CALCIUM SERPL-MCNC: 7.5 MG/DL (ref 8.5–10.1)
CHLORIDE SERPL-SCNC: 104 MMOL/L (ref 94–109)
CO2 SERPL-SCNC: 23 MMOL/L (ref 20–32)
CREAT SERPL-MCNC: 0.75 MG/DL (ref 0.66–1.25)
DIFFERENTIAL METHOD BLD: ABNORMAL
EOSINOPHIL # BLD AUTO: 0.2 10E9/L (ref 0–0.7)
EOSINOPHIL NFR BLD AUTO: 2 %
ERYTHROCYTE [DISTWIDTH] IN BLOOD BY AUTOMATED COUNT: 14.3 % (ref 10–15)
GFR SERPL CREATININE-BSD FRML MDRD: >90 ML/MIN/1.7M2
GLUCOSE SERPL-MCNC: 97 MG/DL (ref 70–99)
HCT VFR BLD AUTO: 27.6 % (ref 40–53)
HGB BLD-MCNC: 9 G/DL (ref 13.3–17.7)
IMM GRANULOCYTES # BLD: 0.1 10E9/L (ref 0–0.4)
IMM GRANULOCYTES NFR BLD: 1.1 %
LYMPHOCYTES # BLD AUTO: 1.9 10E9/L (ref 0.8–5.3)
LYMPHOCYTES NFR BLD AUTO: 23.3 %
MCH RBC QN AUTO: 25.6 PG (ref 26.5–33)
MCHC RBC AUTO-ENTMCNC: 32.6 G/DL (ref 31.5–36.5)
MCV RBC AUTO: 78 FL (ref 78–100)
MONOCYTES # BLD AUTO: 0.9 10E9/L (ref 0–1.3)
MONOCYTES NFR BLD AUTO: 11.6 %
NEUTROPHILS # BLD AUTO: 5 10E9/L (ref 1.6–8.3)
NEUTROPHILS NFR BLD AUTO: 61.5 %
NRBC # BLD AUTO: 0 10*3/UL
NRBC BLD AUTO-RTO: 0 /100
PLATELET # BLD AUTO: 176 10E9/L (ref 150–450)
POTASSIUM SERPL-SCNC: 4.2 MMOL/L (ref 3.4–5.3)
RBC # BLD AUTO: 3.52 10E12/L (ref 4.4–5.9)
SODIUM SERPL-SCNC: 135 MMOL/L (ref 133–144)
WBC # BLD AUTO: 8.1 10E9/L (ref 4–11)

## 2018-12-18 PROCEDURE — 36416 COLLJ CAPILLARY BLOOD SPEC: CPT | Performed by: INTERNAL MEDICINE

## 2018-12-18 PROCEDURE — 99239 HOSP IP/OBS DSCHRG MGMT >30: CPT | Performed by: INTERNAL MEDICINE

## 2018-12-18 PROCEDURE — 85025 COMPLETE CBC W/AUTO DIFF WBC: CPT | Performed by: INTERNAL MEDICINE

## 2018-12-18 PROCEDURE — A9270 NON-COVERED ITEM OR SERVICE: HCPCS | Mod: GY | Performed by: INTERNAL MEDICINE

## 2018-12-18 PROCEDURE — 80048 BASIC METABOLIC PNL TOTAL CA: CPT | Performed by: INTERNAL MEDICINE

## 2018-12-18 PROCEDURE — 25000132 ZZH RX MED GY IP 250 OP 250 PS 637: Mod: GY | Performed by: INTERNAL MEDICINE

## 2018-12-18 PROCEDURE — 25000132 ZZH RX MED GY IP 250 OP 250 PS 637: Mod: GY | Performed by: PHYSICIAN ASSISTANT

## 2018-12-18 PROCEDURE — A9270 NON-COVERED ITEM OR SERVICE: HCPCS | Mod: GY | Performed by: PHYSICIAN ASSISTANT

## 2018-12-18 RX ORDER — TRAMADOL HYDROCHLORIDE 50 MG/1
25 TABLET ORAL EVERY 6 HOURS PRN
Qty: 20 TABLET | Refills: 0 | Status: ON HOLD | OUTPATIENT
Start: 2018-12-18 | End: 2019-08-31

## 2018-12-18 RX ADMIN — SENNOSIDES AND DOCUSATE SODIUM 1 TABLET: 8.6; 5 TABLET ORAL at 08:12

## 2018-12-18 RX ADMIN — ACETAMINOPHEN 975 MG: 325 TABLET, FILM COATED ORAL at 06:31

## 2018-12-18 RX ADMIN — DORZOLAMIDE HYDROCHLORIDE AND TIMOLOL MALEATE 1 DROP: 20; 5 SOLUTION/ DROPS OPHTHALMIC at 08:13

## 2018-12-18 RX ADMIN — FINASTERIDE 5 MG: 5 TABLET, FILM COATED ORAL at 08:12

## 2018-12-18 RX ADMIN — BACLOFEN 10 MG: 10 TABLET ORAL at 08:12

## 2018-12-18 RX ADMIN — ASPIRIN 162 MG: 81 TABLET, COATED ORAL at 08:12

## 2018-12-18 RX ADMIN — CARBIDOPA AND LEVODOPA 2 TABLET: 25; 100 TABLET, EXTENDED RELEASE ORAL at 03:40

## 2018-12-18 RX ADMIN — CARBIDOPA AND LEVODOPA 2 TABLET: 25; 100 TABLET, EXTENDED RELEASE ORAL at 08:13

## 2018-12-18 RX ADMIN — LEVOTHYROXINE SODIUM 50 MCG: 25 TABLET ORAL at 08:12

## 2018-12-18 RX ADMIN — DOCUSATE SODIUM 100 MG: 100 CAPSULE, LIQUID FILLED ORAL at 08:13

## 2018-12-18 RX ADMIN — BRIMONIDINE TARTRATE 1 DROP: 2 SOLUTION OPHTHALMIC at 08:13

## 2018-12-18 RX ADMIN — QUETIAPINE FUMARATE 50 MG: 50 TABLET ORAL at 08:12

## 2018-12-18 ASSESSMENT — ACTIVITIES OF DAILY LIVING (ADL)
ADLS_ACUITY_SCORE: 18
ADLS_ACUITY_SCORE: 19

## 2018-12-18 NOTE — PLAN OF CARE
Physical Therapy Discharge Summary     Reason for therapy discharge:    Discharged to memory care with home PT.      Progress towards therapy goal(s). See goals on Care Plan in [facility] electronic health record for goal details.  Goals not met.  Barriers to achieving goals:   limited tolerance for therapy.     Therapy recommendation(s):    Continued therapy is recommended.  Rationale/Recommendations:  Pt is below baseline with functional mobility and strength and would benefit from continued PT to progress skills.

## 2018-12-18 NOTE — PLAN OF CARE
PT- planned discharge back to group home with HHPT today. Goals were not met. Recommend to PT for discharge. Did not see for session.

## 2018-12-18 NOTE — PROGRESS NOTES
[facility]  Met with pt and family to discuss plans for HC.  Pt to be discharged home today and family has agreed to have [facility] follow with services of PT.  Central Intake processing referral. Family verbalized understanding that initial visit is scheduled for 12/19 or 12/20/18.   Family has 24 hour phone number for [facility] for any questions or concerns.

## 2018-12-18 NOTE — PLAN OF CARE
Pt AOx1. Agitated when staff attempts to do cares or give medication. Sitter present. Pt spit out pills/refused multiple times. Transferring ext.2 w/ sliding scale. WBAT to LLE. Incont of B&B. Voided twice this shift with PVR's <500 ml. Last BM charted on 12/15/2018. Dressing to left hip CDI. IV to RA saline locked. Tele: SR. PCD's in place. Abduction pillow in use. Prn tramadol and scheduled tylenol given to manage pain. Plan for discharge back to memory care facility in 1-2 days.

## 2018-12-18 NOTE — DISCHARGE INSTRUCTIONS
You have a new order for [facility]. They will be contacting you within 24-48 hours to set up initial visit. If you have not heard from them after this time, please contact them at ([phone]).

## 2018-12-18 NOTE — PROGRESS NOTES
Discharge AVS given to family. Wife, tasia stated understanding of discharge plan. RX for tramadol filled at [facility] and given to HE . Medications explained to pt spouse.

## 2018-12-18 NOTE — DISCHARGE SUMMARY
Discharge Summary  Hospitalist Service    Sylvester Turk MRN# [id number]   YOB: 1935 Age: 83 year old     Date of Admission:  12/15/2018  Date of Discharge:  12/18/2018  Admitting Physician:  Rashaun Meade MD  Discharge Physician: Aubree Osborn MD  Discharging Service: Hospitalist Service     Primary Provider: No Ref-Primary, Physician  Primary Care Physician Phone Number: None         Discharge Diagnoses/Problem Oriented Hospital Course (Providers):    Sylvester Turk was admitted on 12/15/2018 by Rashaun Meade MD and I would refer you to their history and physical.  The following problems were addressed during his hospitalization:      Hip fx  Dementia complicated by delirium  intermittent lethargy  ABL anemia  Parkinson's dz  AAA  BPH  hypothyroidism         Code Status:      DNR / DNI        Brief Hospital Stay Summary Sent Home With Patient in AVS:        Reason for your hospital stay      Left hip hemiarthroplasty secondary to femoral neck fracture             Summary of Stay: Sylvester Turk is a 83 year old male with a history of parkinson's dz complicated by dementia and spasticity, AAA measured at 7.1 cm (without plans for intervention), BPH, hypothyroidism admitted on 12/15/2018 with hip pain after a mechanical fall and found to have a left femoral neck fx, ortho was consulted and took him for left hip hemiarthroplasty that evening.       His hospitalization has been mostly complicated by delirium and urinary retention and intermittent lethargy.     Urinary retention has improved, but intermittent agitation remains an issue    His discharge plan is therefore rather complicated- Had long discussion with wife and son in law at the bedside, there is no perfect place for discharge-as he's not really rehabable based on prior performance and going to TCU is only likely to confuse him more, he's at risk of further falls at memory care-but they can supplement coverage on their  "own if financially feasible.  LTC an option but also runs risk of worsening delirium at least in the short term.  In discussion with family/SW-plans are to return to [facility], with additional support by family members, and home health PT.    Problem List:   1. Hip fx:  S/p ORIF 12/15/18, appreciate ortho input,PT/OT, unfortunately therapy delayed by lethargy and inability to participate.  Lives at [facility], is mostly wheelchair bound (able only to walk a few steps before getting \"frozen).  Plan will be to return to [facility] with assist at discharge and home PT  2. Dementia complicated by delirium: at home on quetiapine 50 mg qam which is continued, cont with prn additional doses of quetiapine at bedtime to help maintain sleep/wake cycle, and prn haloperidol  3. Intermittent lethargy ? Due to additional quetiapine overnight along with usual am quetiapine/baclofen,  Narcotics now all discontinued.  much improved at discharge  4. ABL anemia:  Presumably from surgery, no indication for transfusion at this juncture  5. Parkinson's dz:  Continue baseline carbi/levodopa, baclofen for spasticity  6. AAA:  Quite large at 7.1 cm- no plans for intervention   7. BPH with  Urinary retention: transient and likely due to narcotics. Currently urinating without difficulty, continued on home finasteride and tamsulosin  8. Hypothyroidism:  Cont replacement as at home  DVT Prophylaxis: Pneumatic Compression Devices  Code Status: DNR / DNI  Functional status:  Lives in memory care, predominantly sits, gets around by wheelchair, assisst 1-2 for all cares           Important Results:      As noted below         Pending Results:        Unresulted Labs Ordered in the Past 30 Days of this Admission     No orders found from 10/16/2018 to 12/16/2018.            Discharge Instructions and Follow-Up:      Follow-up Appointments     Follow Up and recommended labs and tests      Follow up with Dr. Tony in 2-3 weeks.  No follow up labs " or test are   needed.  Call [facility] for appointment [phone]         Follow-up and recommended labs and tests       F/u with primary MD in 3-4 weeks               Discharge Disposition:      Discharged to home         Discharge Medications:        Current Discharge Medication List      START taking these medications    Details   acetaminophen (TYLENOL) 325 MG tablet Take 3 tablets (975 mg) by mouth every 8 hours for 7 days  Qty: 60 tablet, Refills: 0    Associated Diagnoses: Fracture of hip, left, closed, initial encounter (H)      aspirin (ASA) 81 MG EC tablet Take 2 tablets (162 mg) by mouth 2 times daily (with meals)  Qty: 120 tablet, Refills: 0    Associated Diagnoses: Fracture of hip, left, closed, initial encounter (H)      senna-docusate (SENOKOT-S/PERICOLACE) 8.6-50 MG tablet Take 2 tablets by mouth 2 times daily for 8 days  Qty: 30 tablet, Refills: 0    Associated Diagnoses: Fracture of hip, left, closed, initial encounter (H)      traMADol (ULTRAM) 50 MG tablet Take 0.5 tablets (25 mg) by mouth every 4 hours as needed for moderate to severe pain or severe pain  Qty: 30 tablet, Refills: 0    Associated Diagnoses: Fracture of hip, left, closed, initial encounter (H)         CONTINUE these medications which have NOT CHANGED    Details   baclofen (LIORESAL) 10 MG tablet Take 10 mg by mouth 2 times daily      bisacodyl ([employer] BISACODYL) 10 MG suppository Place 10 mg rectally daily as needed for constipation      brimonidine (ALPHAGAN) 0.2 % ophthalmic solution Place 1 drop into the right eye 2 times daily      camphor-menthol (SARNA) 0.5-0.5 % external lotion Apply topically At Bedtime Apply to feet and legs at bedtime      carbidopa-levodopa (SINEMET CR)  MG CR tablet Take 2 tablets by mouth 4 times daily [id number]      Carboxymethylcellulose Sod PF (REFRESH PLUS) 0.5 % SOLN ophthalmic solution Place 1 drop into both eyes At Bedtime      dorzolamide-timolol (COSOPT) 2-0.5 % ophthalmic solution  "Place 1 drop into the right eye 2 times daily      finasteride (PROSCAR) 5 MG tablet Take 5 mg by mouth daily      gabapentin (NEURONTIN) 300 MG capsule Take 300 mg by mouth 3 times daily      latanoprost (XALATAN) 0.005 % ophthalmic solution Place 1 drop into the right eye At Bedtime      levothyroxine (SYNTHROID/LEVOTHROID) 50 MCG tablet Take 50 mcg by mouth daily      LORazepam (ATIVAN) 0.5 MG tablet Take 0.25 mg by mouth 2 times daily as needed for anxiety      magnesium hydroxide (MILK OF MAGNESIA) 400 MG/5ML suspension Take 15 mLs by mouth daily as needed for constipation or heartburn      QUEtiapine (SEROQUEL) 50 MG tablet Take 50 mg by mouth every morning      tamsulosin (FLOMAX) 0.4 MG capsule Take 0.8 mg by mouth At Bedtime      Camphor-Eucalyptus-Menthol (VAPORIZING CHEST RUB EX)          STOP taking these medications       Acetaminophen (TYLENOL) 325 MG CAPS Comments:   Reason for Stopping:                 Allergies:       No Known Allergies        Consultations This Hospital Stay:      Consultation during this admission received from orthopedics         Condition and Physical on Discharge:      Discharge condition: Stable   Vitals: Blood pressure 111/67, temperature 96.6  F (35.9  C), temperature source Axillary, resp. rate 20, height 1.88 m (6' 2\"), SpO2 98 %.     Constitutional: Pleasant nad looks stated age head nc/at - laying in bed, awakens easily to voice.  Chronic right eye irritation due  To remote history of steel fragment becoming lodged in his eye.  Bradykinetic facial features   Lungs: ctab nl effort   Cardiovascular: rrr no mrg no le edema   Abdomen: S/nt/nd   Skin: W/d no c/c   Other: Affect is pleasant and cooperative, alert to self         Discharge Time:      Greater than 30 minutes.        Image Results From This Hospital Stay (For Non-EPIC Providers):        Results for orders placed or performed during the hospital encounter of 12/15/18   CT Head w/o Contrast    Narrative    CT OF " THE HEAD WITHOUT CONTRAST December 15, 2018 11:29 AM     HISTORY: Head trauma, headache.    TECHNIQUE: 5 mm thick axial CT images of the head were acquired  without IV contrast material. Radiation dose for this scan was reduced  using automated exposure control, adjustment of the mA and/or kV  according to patient size, or iterative reconstruction technique.    COMPARISON: None.    FINDINGS: There is moderate diffuse cerebral volume loss. There are  subtle patchy areas of decreased density in the cerebral white matter  bilaterally that are consistent with sequela of chronic small vessel  ischemic disease.     The ventricles and basal cisterns are within normal limits in  configuration given the degree of cerebral volume loss. There is no  midline shift. There are no extra-axial fluid collections.     No intracranial hemorrhage, mass or recent infarct.    There is mixed density debris in the right maxillary sinus that could  represent sinusitis or hemorrhage within the sinus related to either  epistaxis or fracture of the nonvisualized portion of the right  maxillary sinus. The remaining paranasal sinuses are well aerated.  There is no mastoiditis. There are no fractures of the visualized  bones.       Impression    IMPRESSION: Debris in the right maxillary sinus possibly representing  maxillary sinusitis although hemorrhage from epistaxis or fracture  through the nonvisualized portions of the right maxillary sinus are  also possible. Diffuse cerebral volume loss and cerebral white matter  changes consistent with chronic small vessel ischemic disease. No  evidence for acute intracranial pathology.          JIM TAVAREZ MD   CT Cervical Spine w/o Contrast    Narrative    CT OF THE CERVICAL SPINE WITHOUT CONTRAST December 15, 2018 11:29 AM     HISTORY: Neck pain, initial exam; trauma, fall.     TECHNIQUE: Axial images of the cervical spine were acquired without  intravenous contrast. Multiplanar reformations were  created. Radiation  dose for this scan was reduced using automated exposure control,  adjustment of the mA and/or kV according to patient size, or iterative  reconstruction technique.     COMPARISON: None.    FINDINGS: There is normal alignment of the cervical vertebrae.  Vertebral body heights of the cervical spine are normal.  Craniocervical alignment is normal. There are no fractures of the  cervical spine. There is degenerative endplate spurring at C5-C6 and  C6-C7 levels. There is minimal facet arthropathy throughout the  cervical spine. There is no degenerative spinal canal narrowing of the  cervical spine. There is no prevertebral soft tissue swelling.      Impression    IMPRESSION: No evidence for fracture or traumatic malalignment of the  cervical spine.      JIM TAVAREZ MD   CT Pelvis Bone wo Contrast    Narrative    CT PELVIS BONE WITHOUT CONTRAST December 15, 2018 11:30 AM     HISTORY: Pelvis trauma, fracture known or suspected, x-ray  insufficient.    TECHNIQUE: Axial images with reconstructions. No IV contrast.  Radiation dose for this scan was reduced using automated exposure  control, adjustment of the mA and/or kV according to patient size, or  iterative reconstruction technique.    COMPARISON: None.    FINDINGS: There is a left femoral neck fracture with displacement,  angulation, and impaction. There is an abdominal aortic aneurysm  measuring 7.1 cm in diameter (only the distal abdominal aorta is  imaged). There is a right common iliac artery aneurysm with a diameter  of 3 cm. There is a left common iliac artery aneurysm with a diameter  of 2.5 cm. Prostate enlargement with impression on the bladder.  Degenerative change of the lower lumbar spine. Bridging hyperostosis  at the anterior aspect of the right sacroiliac joint. Mild left  sacroiliac joint degenerative change.       Impression    IMPRESSION:  1. Left femoral neck fracture.  2. 7.1 cm abdominal aortic aneurysm.  3. 3 cm right and 2.5  cm left common iliac artery aneurysms.  4. Additional findings discussed above.    RAFA ROJAS MD   XR Pelvis w Hip Port Left 1 View    Narrative    PELVIS AND HIP PORTABLE LEFT ONE VIEW 12/15/2018 10:06 PM     HISTORY: Status post left hemiarthroplasty.    COMPARISON: CT pelvis 12/15/2018.      Impression    IMPRESSION: Interval placement of a left femoral head and neck bipolar  hip prosthesis with components in expected locations. Skin staples and  postoperative soft tissue air are noted.    MITUL NJ MD           Most Recent Lab Results In EPIC (For Non-EPIC Providers):    Most Recent 3 CBC's:  Recent Labs   Lab Test 12/18/18  0713 12/17/18  0808 12/16/18  1844  12/16/18  0649   WBC 8.1 8.4  --   --  11.0   HGB 9.0* 9.5* 9.7*   < > 10.5*   MCV 78 82  --   --  80    170  --   --  189    < > = values in this interval not displayed.      Most Recent 3 BMP's:  Recent Labs   Lab Test 12/18/18  0713 12/17/18  0808 12/16/18  0649    137 137   POTASSIUM 4.2 3.8 4.1   CHLORIDE 104 104 105   CO2 23 25 26   BUN 31* 30 23   CR 0.75 0.84 0.79   ANIONGAP 8 8 6   THI 7.5* 7.9* 8.0*   GLC 97 100* 100*

## 2018-12-18 NOTE — PROGRESS NOTES
Orthopedic Surgery  Sylvester Turk  2018  Admit Date:  12/15/2018  POD # 3  S/P left hip hemiarthroplasty with Bipolar    Patient sitting in bed comfortably.  Pain controlled.  Tolerating oral intake.  No events overnight. Family at bedside assisting with questions.    Alert and orient to person only.  Vital Sign Ranges  Temperature Temp  Av.9  F (36.1  C)  Min: 96.6  F (35.9  C)  Max: 97.3  F (36.3  C)   Blood pressure Systolic (24hrs), Av , Min:111 , Max:150        Diastolic (24hrs), Av, Min:67, Max:99      Pulse No Data Recorded   Respirations Resp  Av.5  Min: 16  Max: 20   Pulse oximetry SpO2  Av.7 %  Min: 98 %  Max: 100 %       Left hip dressing is clean, dry, and intact. Minimal erythema of the surrounding skin.  Bilateral calves are soft, non-tender.  bilateral lower extremity is NVI.  Sensation intact bilaterally  Able to actively dorsi and plantar flex   2+ DP/PT pulses    Labs:  Recent Labs   Lab Test 18  0713 18  0808 18  0649   POTASSIUM 4.2 3.8 4.1     Recent Labs   Lab Test 18  0713 18  0808 18  1844   HGB 9.0* 9.5* 9.7*     No results for input(s): INR in the last 39790 hours.  Recent Labs   Lab Test 18  0713 18  0808 18  0649    170 189       A/P  1. Plan   Continue  for DVT prophylaxis.     Mobilize with PT/OT WBAT.     Continue current pain regiment.   Continue Hip abductor pillow at night for 2 weeks    2. Disposition   Anticipate d/c to memory care on Today with SW help.    Low Nowak PA-C

## 2018-12-18 NOTE — PLAN OF CARE
A/O to self, disorientated to time, place and situation, VS - BP 150s/90s, Asstx2 with yazan steady for transfers, incontinent of bowel and bladder, Tele SR, R PIV SL, at times can be agitated with staff, several attempts to get pt to swallow medication, voiding spontaneously this shift - post void residual less than 500ml, Dresssing to L hip CDI - scant drainage noted, abduction pillow maintained, possible discharge back to memory care today or tomorrow, continue with plan of care.

## 2019-01-02 ENCOUNTER — RECORDS - HEALTHEAST (OUTPATIENT)
Dept: LAB | Facility: CLINIC | Age: 84
End: 2019-01-02

## 2019-01-02 LAB
25(OH)D3 SERPL-MCNC: 9.7 NG/ML (ref 30–80)
VIT B12 SERPL-MCNC: 285 PG/ML (ref 213–816)

## 2019-01-11 NOTE — OP NOTE
Procedure Date: 12/15/2018      PREOPERATIVE DIAGNOSIS:  Left displaced femoral neck fracture.        POSTOPERATIVE DIAGNOSIS:  Left displaced femoral neck fracture.        PROCEDURE:  Left hip hemiarthroplasty.        SURGEON:  Ronny Tony MD        ASSISTANT:  Low Nowak PA-C       ANESTHESIA:  Regional.        ANESTHESIA:  Spinal.        ESTIMATED BLOOD LOSS:  150 mL.        INTRAOPERATIVE COMPLICATIONS:  None apparent.        INDICATIONS:  The patient is an 83-year-old gentleman who sustained an unwitnessed fall on his left side and was noted to have a displaced femoral neck fracture.  Risks, benefits, alternatives to hemiarthroplasty were reviewed at length with the patient and his family and they elected to proceed.        DESCRIPTION OF PROCEDURE:  The patient was identified in the preoperative holding area and the operative site was marked.  The consent was again reviewed.  All questions were answered.  He was taken to the operating room, placed under general anesthesia, positioned in the right lateral decubitus position with all bony prominences well padded with the left lower extremity prepped and draped in the usual sterile fashion.  Preoperative antibiotics administered and timeout called to confirm the correct operative site and procedure.  A longitudinal incision was made along the most posterior border of the trochanter with sharp dissection down through the skin and subcutaneous tissues to the level of the iliotibial band, which was split sharply and the gluteal fascia was split bluntly in line with its fibers.  An East-West retractor was placed and the posterior capsule and short external rotators were taken down as a posterior based flap and tagged for later repair.  The femoral head was excised and sized to a 52.  The neck cut was freshened.  Sequential broaching commenced up to a size 7, at which time the Accolade C 127 degree neck angle cemented hip stem was opened.  After repairing the  canal and placing a distal site and placing a cement restrictor sent, a centralizer was placed on the stem, and the stem was cemented in place with the stem held in appropriate degree of version with slight increase inversion during cement polymerization.  After trialing the +0 head length provided excellent stability and this was therefore opened and seated onto the trunnion.  The hip showed excellent stability in the sleeper position with no subluxation.  The hip was internally rotated 65 degrees prior to subluxation with the hip flexed at 90 degrees.  After a diluted Betadine soak, the wound was irrigated with pulsatile lavage.  A pericapsular anesthetic cocktail was infiltrated.  The short external rotators and capsule were repaired through drill holes in the greater trochanter.  The iliotibial band and gluteal fascia were closed with Vicryl.  The remainder of the wound was closed in layers.  Sterile dressings and an abduction pillow were applied.          The patient was then awoken from general anesthesia and transferred to the postanesthesia care in stable condition.          Low Nowak PA-C was present and scrubbed throughout the case and his assistance was critical for patient positioning, assistance with prepping, draping, soft tissue retraction, leg manipulation, wound closure, dressing and abduction pillow application.       Revised asst name lg 19         CARSON OLSEN MD             D: 2018   T: 2018   MT: [place]      Name:     DANIEL WHITE   MRN:      [id number]        Account:        [id number]   :      1935           Procedure Date: 12/15/2018      Document: [id number]

## 2019-01-21 ENCOUNTER — RECORDS - HEALTHEAST (OUTPATIENT)
Dept: LAB | Facility: CLINIC | Age: 84
End: 2019-01-21

## 2019-01-21 LAB
ALBUMIN SERPL-MCNC: 2.9 G/DL (ref 3.5–5)
ALP SERPL-CCNC: 101 U/L (ref 45–120)
ALT SERPL W P-5'-P-CCNC: <9 U/L (ref 0–45)
ANION GAP SERPL CALCULATED.3IONS-SCNC: 9 MMOL/L (ref 5–18)
AST SERPL W P-5'-P-CCNC: 11 U/L (ref 0–40)
BASOPHILS # BLD AUTO: 0.1 THOU/UL (ref 0–0.2)
BASOPHILS NFR BLD AUTO: 1 % (ref 0–2)
BILIRUB SERPL-MCNC: 0.7 MG/DL (ref 0–1)
BUN SERPL-MCNC: 17 MG/DL (ref 8–28)
CALCIUM SERPL-MCNC: 8.8 MG/DL (ref 8.5–10.5)
CHLORIDE BLD-SCNC: 105 MMOL/L (ref 98–107)
CO2 SERPL-SCNC: 24 MMOL/L (ref 22–31)
CREAT SERPL-MCNC: 0.79 MG/DL (ref 0.7–1.3)
EOSINOPHIL # BLD AUTO: 0.2 THOU/UL (ref 0–0.4)
EOSINOPHIL NFR BLD AUTO: 4 % (ref 0–6)
ERYTHROCYTE [DISTWIDTH] IN BLOOD BY AUTOMATED COUNT: 15.6 % (ref 11–14.5)
GFR SERPL CREATININE-BSD FRML MDRD: >60 ML/MIN/1.73M2
GLUCOSE BLD-MCNC: 90 MG/DL (ref 70–125)
HCT VFR BLD AUTO: 33.2 % (ref 40–54)
HGB BLD-MCNC: 10.2 G/DL (ref 14–18)
LYMPHOCYTES # BLD AUTO: 1.7 THOU/UL (ref 0.8–4.4)
LYMPHOCYTES NFR BLD AUTO: 27 % (ref 20–40)
MCH RBC QN AUTO: 25.8 PG (ref 27–34)
MCHC RBC AUTO-ENTMCNC: 30.7 G/DL (ref 32–36)
MCV RBC AUTO: 84 FL (ref 80–100)
MONOCYTES # BLD AUTO: 0.7 THOU/UL (ref 0–0.9)
MONOCYTES NFR BLD AUTO: 11 % (ref 2–10)
NEUTROPHILS # BLD AUTO: 3.6 THOU/UL (ref 2–7.7)
NEUTROPHILS NFR BLD AUTO: 58 % (ref 50–70)
PLATELET # BLD AUTO: 220 THOU/UL (ref 140–440)
PMV BLD AUTO: 10.5 FL (ref 8.5–12.5)
POTASSIUM BLD-SCNC: 3.9 MMOL/L (ref 3.5–5)
PROT SERPL-MCNC: 6.4 G/DL (ref 6–8)
RBC # BLD AUTO: 3.96 MILL/UL (ref 4.4–6.2)
SODIUM SERPL-SCNC: 138 MMOL/L (ref 136–145)
TSH SERPL DL<=0.005 MIU/L-ACNC: 2.65 UIU/ML (ref 0.3–5)
WBC: 6.2 THOU/UL (ref 4–11)

## 2019-02-09 ENCOUNTER — APPOINTMENT (OUTPATIENT)
Dept: CT IMAGING | Facility: CLINIC | Age: 84
End: 2019-02-09
Attending: EMERGENCY MEDICINE
Payer: MEDICARE

## 2019-02-09 ENCOUNTER — HOSPITAL ENCOUNTER (EMERGENCY)
Facility: CLINIC | Age: 84
Discharge: STILL ADMITTED OR ADMINISTRATIVE | End: 2019-02-09

## 2019-02-09 ENCOUNTER — APPOINTMENT (OUTPATIENT)
Dept: GENERAL RADIOLOGY | Facility: CLINIC | Age: 84
End: 2019-02-09
Attending: EMERGENCY MEDICINE
Payer: MEDICARE

## 2019-02-09 ENCOUNTER — HOSPITAL ENCOUNTER (EMERGENCY)
Facility: CLINIC | Age: 84
Discharge: HOME | End: 2019-02-09
Attending: EMERGENCY MEDICINE | Admitting: EMERGENCY MEDICINE
Payer: MEDICARE

## 2019-02-09 VITALS
DIASTOLIC BLOOD PRESSURE: 103 MMHG | HEART RATE: 96 BPM | SYSTOLIC BLOOD PRESSURE: 157 MMHG | TEMPERATURE: 97.7 F | RESPIRATION RATE: 18 BRPM | OXYGEN SATURATION: 100 %
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
## 2019-02-09 DIAGNOSIS — S01.81XA LACERATION OF FOREHEAD, INITIAL ENCOUNTER: ICD-10-CM

## 2019-02-09 PROCEDURE — 73502 X-RAY EXAM HIP UNI 2-3 VIEWS: CPT

## 2019-02-09 PROCEDURE — A9270 NON-COVERED ITEM OR SERVICE: HCPCS | Mod: GY | Performed by: EMERGENCY MEDICINE

## 2019-02-09 PROCEDURE — 25000132 ZZH RX MED GY IP 250 OP 250 PS 637: Mod: GY | Performed by: EMERGENCY MEDICINE

## 2019-02-09 PROCEDURE — 99284 EMERGENCY DEPT VISIT MOD MDM: CPT | Mod: 25

## 2019-02-09 PROCEDURE — 70450 CT HEAD/BRAIN W/O DYE: CPT

## 2019-02-09 PROCEDURE — 12011 RPR F/E/E/N/L/M 2.5 CM/<: CPT

## 2019-02-09 PROCEDURE — 72125 CT NECK SPINE W/O DYE: CPT

## 2019-02-09 RX ORDER — LIDOCAINE HYDROCHLORIDE AND EPINEPHRINE 10; 10 MG/ML; UG/ML
INJECTION, SOLUTION INFILTRATION; PERINEURAL
Status: DISCONTINUED
Start: 2019-02-09 | End: 2019-02-09 | Stop reason: HOSPADM

## 2019-02-09 RX ORDER — CARBIDOPA AND LEVODOPA 25; 100 MG/1; MG/1
2 TABLET, EXTENDED RELEASE ORAL ONCE
Status: COMPLETED | OUTPATIENT
Start: 2019-02-09 | End: 2019-02-09

## 2019-02-09 RX ADMIN — CARBIDOPA AND LEVODOPA 2 TABLET: 25; 100 TABLET, EXTENDED RELEASE ORAL at 11:45

## 2019-02-09 NOTE — ED NOTES
Called patient nursing facility and spoke with Manny at [phone], the on call nurse. Updated him that patient would be returning to facility. He stated that he would call the facility and updated them as well that patient would be returning.

## 2019-02-09 NOTE — ED AVS SNAPSHOT
[facility] Emergency Department  [address]  [facility] [address]  Phone:  [phone]  Fax:  [phone]                                    Sylvester Turk   MRN: [id number]    Department:  [facility] Emergency Department   Date of Visit:  2/9/2019           After Visit Summary Signature Page    I have received my discharge instructions, and my questions have been answered. I have discussed any challenges I see with this plan with the nurse or doctor.    ..........................................................................................................................................  Patient/Patient Representative Signature      ..........................................................................................................................................  Patient Representative Print Name and Relationship to Patient    ..................................................               ................................................  Date                                   Time    ..........................................................................................................................................  Reviewed by Signature/Title    ...................................................              ..............................................  Date                                               Time          22EPIC Rev 08/18

## 2019-02-09 NOTE — ED TRIAGE NOTES
Patient presents after a fall from memory care. Patient had a fall unwitnessed by staff. Patient is in c-collar PTA. Laceration noted above left eyebrow. Per EMS, no LOC and patient is not on blood thinners. ABC intact without need for intervention at this time.

## 2019-02-09 NOTE — ED PROVIDER NOTES
History     Chief Complaint:  Fall    HPI   HPI limited secondary to patient's dementia. HPI supplemented by patient's daughter.    Sylvester Turk is a 83 year old male, with a history of dementia, AAA, basal cell carcinoma, hypothyroidism and parkinson's disease, who presents with daughter to the emergency department for evaluation of a fall. The patient's daughter reports the patient was trying to get up out of his wheelchair and face planted to the floor. She notes the patient does this often. She notes a wound to his left eye. She reports the patient recently broke his leg and had a partial hip replacement on December 15, 2018 and has been taking 2 324 mg tablets of aspirin daily since. She notes some physical and occupational therapy since the surgery as well. The patient denies any neck pain, shoulder pain, abdominal pain, or chest pain.    Allergies:  No known drug allergies     Medications:    Lioresal  Aspirin  Bisacodyl  Camphor-Eucalyptus-Menthol  Sinemet  Proscar  Gabapentin  Xalatan  Levothyroxine  Ativan  Seroquel  Flomax    Past Medical History:    AAA  Basal cell carcinoma  Hyperplasia  Dementia  Glaucoma  Hypothyroidism  Parkinson's disease    Past Surgical History:    OR IF left hip    Family History:    History reviewed. No pertinent family history.     Social History:  The patient presents to the emergency department with his daughter and son.  Marital Status:   [2]     Review of Systems   Unable to perform ROS: Dementia       Physical Exam   Patient Vitals for the past 24 hrs:   BP Temp Pulse Heart Rate Resp SpO2   02/09/19 1115 (!) 166/97 -- 88 -- -- --   02/09/19 1100 -- -- 81 -- -- 100 %   02/09/19 1045 (!) 168/110 -- 87 -- -- --   02/09/19 1030 160/82 -- 76 -- -- 100 %   02/09/19 1020 (!) 167/97 -- -- -- -- 100 %   02/09/19 1015 (!) 167/97 -- 69 -- -- 99 %   02/09/19 1010 -- -- -- -- -- 99 %   02/09/19 1000 (!) 172/96 -- -- -- -- --   02/09/19 0930 (!) 147/91 -- 66 -- -- 99 %    02/09/19 0920 -- -- -- -- -- 100 %   02/09/19 0915 (!) 146/93 -- 66 -- -- 99 %   02/09/19 0910 -- -- -- -- -- 99 %   02/09/19 0900 137/86 -- 67 -- -- 99 %   02/09/19 0853 (!) 157/93 97.7  F (36.5  C) 71 71 18 100 %     Physical Exam   HENT:   Head:         General: Alert, no acute distress; nontoxic appearing  Neuro:  Right pupil fixed and ovoid appearing (chronic); left pupil 3mm and reactive.  EOMI.  No focal deficits; SILT BUE/BLE intact; GCS 14 - E 4, M 6, V 4  HEENT:  Moist mucous membranes.  Posterior oropharynx clear, no exudates.  Conjunctiva normal. TMs clear bilaterally; cervical collar in place; 1 cm laceration to left lateral eyebrow  CV:  RRR, no m/r/g, skin warm and well perfused  Pulm:  CTAB, no wheezes/ronchi/rales.  No acute distress, breathing comfortably  GI:  Soft, nontender, nondistended.  No rebound or guarding.  Normal bowel sounds  MSK:  Moving all extremities.  No focal areas of edema, erythema, or tenderness; no clavicle/upper extremity/lower extremity tenderness  Skin:  WWP, no rashes, no lower extremity edema, skin color normal, no diaphoresis  Psych:  Well-appearing, normal affect, regular speech  Emergency Department Course   Imaging:  Radiographic findings were communicated with the patient and family who voiced understanding of the findings.    CT Cervical Spine w/o Contrast  IMPRESSION: Mild degenerative changes of the cervical spine again  noted. No evidence for fracture or traumatic malalignment.   As read by Radiology.    XR Pelvis and Hip left 1 View  IMPRESSION: Left hip bipolar femoral head/neck arthroplasty is again  seen without definite change. The distal tip of the femoral component  is not completely included in the field-of-view. If the patient's pain  is in the thigh region, left femur radiographs could be obtained. The  previously seen skin staples and soft tissue air in the proximal left  thigh are no longer present. Pelvic bones and right hip region  are  unremarkable. Degenerative changes are noted in the visualized lower  lumbar spine.  As read by Radiology.    Head CT w/o contrast  IMPRESSION: Persistent right maxillary sinus disease. Diffuse cerebral  volume loss and cerebral white matter changes consistent with chronic  small vessel ischemic disease. No evidence for acute intracranial  Pathology.  As read by Radiology.    Procedures:    Narrative: Procedure: Laceration Repair        LACERATION:  A simple clean 1 cm laceration.      LOCATION:  Left forehead      FUNCTION:  Distally sensation and circulation are intact.      ANESTHESIA: 5 mLs 1% lidocaine with epinephrine      PREPARATION:  Irrigation with Normal Saline      DEBRIDEMENT:  no debridement      CLOSURE:  Wound was closed with One Layer.  Skin closed with 5 x 6.0 Ethylon and 1 x 4.0 Vicryl Sutures using interrupted sutures.    Emergency Department Course:  Patient arrived to the emergency department via EMS.    Past medical records, nursing notes, and vitals reviewed.  0904: I performed an exam of the patient and obtained history, as documented above.    The patient was sent for a pelvis/hip x-ray and cervical spine and head CTs while in the emergency department, findings above.    1015: I rechecked the patient. Explained findings to the patient and his family.    1106: I repaired the laceration, as stated above.     Findings and plan explained to the Patient and son and daughter. Patient discharged home with instructions regarding supportive care, medications, and reasons to return. The importance of close follow-up was reviewed.   Impression & Plan    Medical Decision Making:  Sylvester is 83 years old history of parkinson's disease and dementia presents to the emergency department after mechanical fall from getting up from wheelchair. Recent left hip replacement in December. Patient denies any neck pain. CT scan of the head and neck were obtained as patient does have dementia and poor historian.  Fortunately no intracranial bleed identified and no fracture or subluxation notified on cervical spine CT Cervical collar was removed. X-ray of the left hip was unremarkable for any acute appearing fracture. Patient did have laceration to the left of the eyebrow, which was repaired as above. Tetanus immunization is up to date. The rest of the patient's head to toe exam was unremarkable. With reasonable clinical certainty I believe he is safe for discharge at this time. No further labs or imaging indicated. Return for new or worsening symptoms.    Diagnosis:    ICD-10-CM   1. Fall at nursing home, initial encounter W19.XXXA    Y92.129   2. Laceration of forehead, initial encounter S01.81XA     Disposition:  Discharged to home with instructions for follow up and wound care.  Leatha Wilburn  2/9/2019   [facility] EMERGENCY DEPARTMENT  Scribe Disclosure:  I, Leatha Wilburn, am serving as a scribe at 9:04 AM on 2/9/2019 to document services personally performed by Olvin Davenport MD based on my observations and the provider's statements to me.      Olvin Davenport MD  02/09/19 1545

## 2019-02-18 ENCOUNTER — TRANSFERRED RECORDS (OUTPATIENT)
Dept: HEALTH INFORMATION MANAGEMENT | Facility: CLINIC | Age: 84
End: 2019-02-18

## 2019-03-18 ENCOUNTER — OFFICE VISIT (OUTPATIENT)
Dept: OPHTHALMOLOGY | Facility: CLINIC | Age: 84
End: 2019-03-18
Attending: OPHTHALMOLOGY
Payer: MEDICARE

## 2019-03-18 DIAGNOSIS — H25.10 SENILE NUCLEAR SCLEROSIS: Primary | ICD-10-CM

## 2019-03-18 DIAGNOSIS — Z96.1 PSEUDOPHAKIA: ICD-10-CM

## 2019-03-18 DIAGNOSIS — H25.12 AGE-RELATED NUCLEAR CATARACT, LEFT EYE: Primary | ICD-10-CM

## 2019-03-18 PROCEDURE — G0463 HOSPITAL OUTPT CLINIC VISIT: HCPCS | Mod: ZF

## 2019-03-18 ASSESSMENT — SLIT LAMP EXAM - LIDS
COMMENTS: NORMAL
COMMENTS: NORMAL

## 2019-03-18 ASSESSMENT — VISUAL ACUITY
OS_CC: 20/70
CORRECTION_TYPE: GLASSES
OS_CC+: -2
METHOD: SNELLEN - LINEAR
OD_CC: CF @ 3'

## 2019-03-18 ASSESSMENT — TONOMETRY
IOP_METHOD: ICARE
OD_IOP_MMHG: 11
OD_IOP_MMHG: 22
IOP_METHOD: TONOPEN
OS_IOP_MMHG: 10

## 2019-03-18 ASSESSMENT — REFRACTION_WEARINGRX
OS_AXIS: 160
OS_SPHERE: +2.00
OD_SPHERE: -2.25
OS_CYLINDER: +0.50
OD_AXIS: 020
OS_ADD: +2.50
OD_ADD: +2.50
OD_CYLINDER: +5.00

## 2019-03-18 ASSESSMENT — EXTERNAL EXAM - RIGHT EYE: OD_EXAM: NORMAL

## 2019-03-18 ASSESSMENT — CUP TO DISC RATIO
OS_RATIO: 0.45
OD_RATIO: 0.9

## 2019-03-18 ASSESSMENT — CONF VISUAL FIELD
OD_SUPERIOR_NASAL_RESTRICTION: 3
OD_INFERIOR_NASAL_RESTRICTION: 3

## 2019-03-18 ASSESSMENT — EXTERNAL EXAM - LEFT EYE: OS_EXAM: NORMAL

## 2019-03-18 ASSESSMENT — ENCOUNTER SYMPTOMS: JOINT SWELLING: 1

## 2019-03-18 NOTE — NURSING NOTE
Chief Complaints and History of Present Illnesses   Patient presents with     Cataract Evaluation     Chief Complaint(s) and History of Present Illness(es)     Cataract Evaluation     Laterality: left eye    Associated symptoms: blurred vision, glare, haloes and a need for brighter lights    Pain scale: 0/10              Comments     Referred by [facility]   Cataract evaluation LE  Unable to read to blurred  Latanoprost every day RE  Brimonidine bid RE  cosopt every day RE  Natali Pacheco COA 9:51 AM March 18, 2019

## 2019-03-18 NOTE — PROGRESS NOTES
CC:  Referred from [employer] for cataract LE    HPI:  Referred by [employer] for cataract left eye.     Gtts:  Latanoprost at bedtime RE  Brimonidine BID RE  Cosopt BID RE    POH:  H/o metal in right eye 30+ years ago right eye   Traumatic glaucoma right eye  Complicated cataract surgery right eye 1982  IOL dislocation s/p replacement right eye at [place] 2016    PMH:  Parkinson's  Enlarged prostate on finasteride, flomax    A/P:  1. Cataract left eye  -visually significant  -patient and family interested in surgery  -referred from [employer] [facility] due to patient's overall systemic health, difficulty transferred  -will need clearance by PCP and anesthesia prior to surgery, to have preop by physician at facility later today  -[facility] vs. [facility]; patient DNR/DNI. Has a AAA, Parkinson's  -aim for emmetropia (to watch TV)  -dilates 4-5mm, on flomax    2. Traumatic glaucoma right eye  -IOP elevated right eye today by icare but ok to palpation, tonopen 11, monitor  -on cosopt, brimonidine, latanoprost, continue  -sees an optometrist currently, consider glaucoma eval in future, likely poor surgery candidate due to poor VA potential, but could consider CPC     3. S/p PKP x 2 right eye   -not currently on pred  -graft appears clear      Gaby Card MD  PGY-5, [facility] Fellow    Attending Physician Attestation:  Complete documentation of historical and exam elements from today's encounter can be found in the full encounter summary report (not reduplicated in this progress note).  I personally obtained the chief complaint(s) and history of present illness.  I confirmed and edited as necessary the review of systems, past medical/surgical history, family history, social history, and examination findings as documented by others; and I examined the patient myself.  I personally reviewed the relevant tests, images, and reports as documented above.  I formulated and edited as necessary the assessment and plan and discussed the  findings and management plan with the patient and family. - Geo Rodriguez MD    I personally spent great than 40min with the patient, of which >50% of the time was spent face to face with the patient, counseling and coordinating care with the patient. We discussed the complexity of his diagnosis, the need for further information prior to proceeding with yet another surgery, and the unknown prognosis for the patient at this time.    Geo Rodriguez MD

## 2019-03-18 NOTE — LETTER
3/18/2019       RE: Sylvester Turk  [facility]  [address] [facility] Room [address] [place], [place] [id number]     Dear Colleague,    Thank you for referring your patient, Sylvester Turk, to the EYE CLINIC at [facility]. Please see a copy of my visit note below.    CC:  Referred from [facility] for cataract LE    HPI:  Referred by [employer] for cataract left eye.     Gtts:  Latanoprost at bedtime RE  Brimonidine BID RE  Cosopt BID RE    POH:  H/o metal in right eye 30+ years ago right eye   Traumatic glaucoma right eye  Complicated cataract surgery right eye 1982  IOL dislocation s/p replacement right eye at [place] 2016    PMH:  Parkinson's  Enlarged prostate on finasteride, flomax    A/P:  1. Cataract left eye  -visually significant  -patient and family interested in surgery  -referred from [employer] due to patient's overall systemic health, difficulty transferred  -will need clearance by PCP and anesthesia prior to surgery, to have preop by physician at facility later today  -[facility] vs. [facility]; patient DNR/DNI. Has a AAA, Parkinson's  -aim for emmetropia (to watch TV)  -dilates 4-5mm, on flomax    2. Traumatic glaucoma right eye  -IOP elevated right eye today by icare but ok to palpation, tonopen 11, monitor  -on cosopt, brimonidine, latanoprost, continue  -sees an optometrist currently, consider glaucoma eval in future, likely poor surgery candidate due to poor VA potential, but could consider CPC     3. S/p PKP x 2 right eye   -not currently on pred  -graft appears clear      Again, thank you for allowing me to participate in the care of your patient.      Sincerely,    Geo Rodriguez MD

## 2019-03-21 ENCOUNTER — RECORDS - HEALTHEAST (OUTPATIENT)
Dept: LAB | Facility: CLINIC | Age: 84
End: 2019-03-21

## 2019-03-21 ENCOUNTER — TELEPHONE (OUTPATIENT)
Dept: OPHTHALMOLOGY | Facility: CLINIC | Age: 84
End: 2019-03-21

## 2019-03-21 LAB
ANION GAP SERPL CALCULATED.3IONS-SCNC: 10 MMOL/L (ref 5–18)
BASOPHILS # BLD AUTO: 0.1 THOU/UL (ref 0–0.2)
BASOPHILS NFR BLD AUTO: 1 % (ref 0–2)
BUN SERPL-MCNC: 24 MG/DL (ref 8–28)
CALCIUM SERPL-MCNC: 9.4 MG/DL (ref 8.5–10.5)
CHLORIDE BLD-SCNC: 103 MMOL/L (ref 98–107)
CO2 SERPL-SCNC: 26 MMOL/L (ref 22–31)
CREAT SERPL-MCNC: 0.84 MG/DL (ref 0.7–1.3)
EOSINOPHIL # BLD AUTO: 0.2 THOU/UL (ref 0–0.4)
EOSINOPHIL NFR BLD AUTO: 2 % (ref 0–6)
ERYTHROCYTE [DISTWIDTH] IN BLOOD BY AUTOMATED COUNT: 14.2 % (ref 11–14.5)
GFR SERPL CREATININE-BSD FRML MDRD: >60 ML/MIN/1.73M2
GLUCOSE BLD-MCNC: 74 MG/DL (ref 70–125)
HCT VFR BLD AUTO: 42.4 % (ref 40–54)
HGB BLD-MCNC: 12.8 G/DL (ref 14–18)
LYMPHOCYTES # BLD AUTO: 2.4 THOU/UL (ref 0.8–4.4)
LYMPHOCYTES NFR BLD AUTO: 26 % (ref 20–40)
MCH RBC QN AUTO: 24.8 PG (ref 27–34)
MCHC RBC AUTO-ENTMCNC: 30.2 G/DL (ref 32–36)
MCV RBC AUTO: 82 FL (ref 80–100)
MONOCYTES # BLD AUTO: 0.9 THOU/UL (ref 0–0.9)
MONOCYTES NFR BLD AUTO: 9 % (ref 2–10)
NEUTROPHILS # BLD AUTO: 5.8 THOU/UL (ref 2–7.7)
NEUTROPHILS NFR BLD AUTO: 63 % (ref 50–70)
PLATELET # BLD AUTO: 209 THOU/UL (ref 140–440)
PMV BLD AUTO: 11 FL (ref 8.5–12.5)
POTASSIUM BLD-SCNC: 4 MMOL/L (ref 3.5–5)
RBC # BLD AUTO: 5.17 MILL/UL (ref 4.4–6.2)
SODIUM SERPL-SCNC: 139 MMOL/L (ref 136–145)
WBC: 9.3 THOU/UL (ref 4–11)

## 2019-03-21 NOTE — TELEPHONE ENCOUNTER
Spoke to patient's Daughter Dot. Advised we need to get a chart review to see which location patient can have surgery due to past cardiac history. Advised I will call her back once chart review done. Advised can take a day or 2 to complete.

## 2019-04-03 ENCOUNTER — TELEPHONE (OUTPATIENT)
Dept: OPHTHALMOLOGY | Facility: CLINIC | Age: 84
End: 2019-04-03

## 2019-04-03 NOTE — TELEPHONE ENCOUNTER
Patient is scheduled for surgery with Dr. Rodriguez      Spoke or left message with: patient's daughter Dot Ayoub    Date of Surgery: 5/10/19     Location: [facility] due to cardiac history    Informed patient they will need an adult  Yes    Pre-op with surgeon (if applicable): [facility]    H&P: Scheduled with In house Dr. Tony comes to Nursing home.    Additional imaging/appointments: post op appointmnets scheduled.    Surgery packet: mailed 4/4/19    Additional comments: Advised RN will call 1 - 3 days prior with arrival time and instructions.

## 2019-04-26 ENCOUNTER — RECORDS - HEALTHEAST (OUTPATIENT)
Dept: LAB | Facility: CLINIC | Age: 84
End: 2019-04-26

## 2019-04-26 LAB
ANION GAP SERPL CALCULATED.3IONS-SCNC: 8 MMOL/L (ref 5–18)
BASOPHILS # BLD AUTO: 0.1 THOU/UL (ref 0–0.2)
BASOPHILS NFR BLD AUTO: 1 % (ref 0–2)
BUN SERPL-MCNC: 20 MG/DL (ref 8–28)
CALCIUM SERPL-MCNC: 9.1 MG/DL (ref 8.5–10.5)
CHLORIDE BLD-SCNC: 103 MMOL/L (ref 98–107)
CO2 SERPL-SCNC: 27 MMOL/L (ref 22–31)
CREAT SERPL-MCNC: 0.81 MG/DL (ref 0.7–1.3)
EOSINOPHIL # BLD AUTO: 0.2 THOU/UL (ref 0–0.4)
EOSINOPHIL NFR BLD AUTO: 2 % (ref 0–6)
ERYTHROCYTE [DISTWIDTH] IN BLOOD BY AUTOMATED COUNT: 15.3 % (ref 11–14.5)
GFR SERPL CREATININE-BSD FRML MDRD: >60 ML/MIN/1.73M2
GLUCOSE BLD-MCNC: 82 MG/DL (ref 70–125)
HCT VFR BLD AUTO: 41.2 % (ref 40–54)
HGB BLD-MCNC: 12.8 G/DL (ref 14–18)
LYMPHOCYTES # BLD AUTO: 2.3 THOU/UL (ref 0.8–4.4)
LYMPHOCYTES NFR BLD AUTO: 23 % (ref 20–40)
MCH RBC QN AUTO: 24.8 PG (ref 27–34)
MCHC RBC AUTO-ENTMCNC: 31.1 G/DL (ref 32–36)
MCV RBC AUTO: 80 FL (ref 80–100)
MONOCYTES # BLD AUTO: 0.8 THOU/UL (ref 0–0.9)
MONOCYTES NFR BLD AUTO: 8 % (ref 2–10)
NEUTROPHILS # BLD AUTO: 6.3 THOU/UL (ref 2–7.7)
NEUTROPHILS NFR BLD AUTO: 66 % (ref 50–70)
PLATELET # BLD AUTO: 193 THOU/UL (ref 140–440)
PMV BLD AUTO: 10.1 FL (ref 8.5–12.5)
POTASSIUM BLD-SCNC: 3.8 MMOL/L (ref 3.5–5)
RBC # BLD AUTO: 5.16 MILL/UL (ref 4.4–6.2)
SODIUM SERPL-SCNC: 138 MMOL/L (ref 136–145)
WBC: 9.7 THOU/UL (ref 4–11)

## 2019-05-08 RX ORDER — TRAZODONE HYDROCHLORIDE 50 MG/1
50 TABLET, FILM COATED ORAL AT BEDTIME
COMMUNITY

## 2019-05-09 ENCOUNTER — ANESTHESIA EVENT (OUTPATIENT)
Dept: SURGERY | Facility: CLINIC | Age: 84
End: 2019-05-09
Payer: MEDICARE

## 2019-05-10 ENCOUNTER — DOCUMENTATION ONLY (OUTPATIENT)
Dept: OTHER | Facility: CLINIC | Age: 84
End: 2019-05-10

## 2019-05-10 ENCOUNTER — AMBULATORY - HEALTHEAST (OUTPATIENT)
Dept: OTHER | Facility: CLINIC | Age: 84
End: 2019-05-10

## 2019-05-10 ENCOUNTER — HOSPITAL ENCOUNTER (OUTPATIENT)
Facility: CLINIC | Age: 84
Discharge: NURSING, REHAB OR LONG-TERM CARE | End: 2019-05-10
Attending: OPHTHALMOLOGY | Admitting: OPHTHALMOLOGY
Payer: MEDICARE

## 2019-05-10 ENCOUNTER — ANESTHESIA (OUTPATIENT)
Dept: SURGERY | Facility: CLINIC | Age: 84
End: 2019-05-10
Payer: MEDICARE

## 2019-05-10 VITALS
SYSTOLIC BLOOD PRESSURE: 140 MMHG | RESPIRATION RATE: 11 BRPM | TEMPERATURE: 98.8 F | DIASTOLIC BLOOD PRESSURE: 95 MMHG | OXYGEN SATURATION: 96 % | WEIGHT: 180.12 LBS | HEART RATE: 67 BPM | BODY MASS INDEX: 23.12 KG/M2 | HEIGHT: 74 IN

## 2019-05-10 DIAGNOSIS — H25.12 AGE-RELATED NUCLEAR CATARACT, LEFT EYE: Primary | ICD-10-CM

## 2019-05-10 LAB — GLUCOSE BLDC GLUCOMTR-MCNC: 51 MG/DL (ref 70–99)

## 2019-05-10 PROCEDURE — 37000009 ZZH ANESTHESIA TECHNICAL FEE, EACH ADDTL 15 MIN: Performed by: OPHTHALMOLOGY

## 2019-05-10 PROCEDURE — 36000059 ZZH SURGERY LEVEL 3 EA 15 ADDTL MIN UMMC: Performed by: OPHTHALMOLOGY

## 2019-05-10 PROCEDURE — 71000027 ZZH RECOVERY PHASE 2 EACH 15 MINS: Performed by: OPHTHALMOLOGY

## 2019-05-10 PROCEDURE — 40000170 ZZH STATISTIC PRE-PROCEDURE ASSESSMENT II: Performed by: OPHTHALMOLOGY

## 2019-05-10 PROCEDURE — A9270 NON-COVERED ITEM OR SERVICE: HCPCS | Performed by: OPHTHALMOLOGY

## 2019-05-10 PROCEDURE — 36000057 ZZH SURGERY LEVEL 3 1ST 30 MIN - UMMC: Performed by: OPHTHALMOLOGY

## 2019-05-10 PROCEDURE — V2632 POST CHMBR INTRAOCULAR LENS: HCPCS | Performed by: OPHTHALMOLOGY

## 2019-05-10 PROCEDURE — 25800030 ZZH RX IP 258 OP 636: Performed by: NURSE ANESTHETIST, CERTIFIED REGISTERED

## 2019-05-10 PROCEDURE — 27211024 ZZHC OR SUPPLY OTHER OPNP: Performed by: OPHTHALMOLOGY

## 2019-05-10 PROCEDURE — 25000128 H RX IP 250 OP 636: Performed by: NURSE ANESTHETIST, CERTIFIED REGISTERED

## 2019-05-10 PROCEDURE — 25000125 ZZHC RX 250: Performed by: OPHTHALMOLOGY

## 2019-05-10 PROCEDURE — 37000008 ZZH ANESTHESIA TECHNICAL FEE, 1ST 30 MIN: Performed by: OPHTHALMOLOGY

## 2019-05-10 PROCEDURE — 82962 GLUCOSE BLOOD TEST: CPT

## 2019-05-10 PROCEDURE — 25000125 ZZHC RX 250: Performed by: NURSE ANESTHETIST, CERTIFIED REGISTERED

## 2019-05-10 PROCEDURE — 25000128 H RX IP 250 OP 636: Performed by: OPHTHALMOLOGY

## 2019-05-10 PROCEDURE — 27210794 ZZH OR GENERAL SUPPLY STERILE: Performed by: OPHTHALMOLOGY

## 2019-05-10 DEVICE — EYE IMP IOL ALCON PCL SN60WF ACRYSOF IQ 21.0: Type: IMPLANTABLE DEVICE | Site: EYE | Status: FUNCTIONAL

## 2019-05-10 RX ORDER — PROPOFOL 10 MG/ML
INJECTION, EMULSION INTRAVENOUS PRN
Status: DISCONTINUED | OUTPATIENT
Start: 2019-05-10 | End: 2019-05-10

## 2019-05-10 RX ORDER — CYCLOPENTOLATE HYDROCHLORIDE 10 MG/ML
1 SOLUTION/ DROPS OPHTHALMIC
Status: DISCONTINUED | OUTPATIENT
Start: 2019-05-10 | End: 2019-05-10 | Stop reason: HOSPADM

## 2019-05-10 RX ORDER — LIDOCAINE 40 MG/G
CREAM TOPICAL
Status: DISCONTINUED | OUTPATIENT
Start: 2019-05-10 | End: 2019-05-10 | Stop reason: HOSPADM

## 2019-05-10 RX ORDER — SODIUM CHLORIDE, SODIUM LACTATE, POTASSIUM CHLORIDE, CALCIUM CHLORIDE 600; 310; 30; 20 MG/100ML; MG/100ML; MG/100ML; MG/100ML
INJECTION, SOLUTION INTRAVENOUS CONTINUOUS
Status: DISCONTINUED | OUTPATIENT
Start: 2019-05-10 | End: 2019-05-10 | Stop reason: HOSPADM

## 2019-05-10 RX ORDER — ONDANSETRON 4 MG/1
4 TABLET, ORALLY DISINTEGRATING ORAL EVERY 30 MIN PRN
Status: DISCONTINUED | OUTPATIENT
Start: 2019-05-10 | End: 2019-05-10 | Stop reason: HOSPADM

## 2019-05-10 RX ORDER — ONDANSETRON 2 MG/ML
4 INJECTION INTRAMUSCULAR; INTRAVENOUS EVERY 30 MIN PRN
Status: DISCONTINUED | OUTPATIENT
Start: 2019-05-10 | End: 2019-05-10 | Stop reason: HOSPADM

## 2019-05-10 RX ORDER — PHENYLEPHRINE HYDROCHLORIDE 25 MG/ML
1 SOLUTION/ DROPS OPHTHALMIC
Status: DISCONTINUED | OUTPATIENT
Start: 2019-05-10 | End: 2019-05-10 | Stop reason: HOSPADM

## 2019-05-10 RX ORDER — EPHEDRINE SULFATE 50 MG/ML
INJECTION, SOLUTION INTRAVENOUS PRN
Status: DISCONTINUED | OUTPATIENT
Start: 2019-05-10 | End: 2019-05-10

## 2019-05-10 RX ORDER — SODIUM CHLORIDE, SODIUM LACTATE, POTASSIUM CHLORIDE, CALCIUM CHLORIDE 600; 310; 30; 20 MG/100ML; MG/100ML; MG/100ML; MG/100ML
INJECTION, SOLUTION INTRAVENOUS CONTINUOUS PRN
Status: DISCONTINUED | OUTPATIENT
Start: 2019-05-10 | End: 2019-05-10

## 2019-05-10 RX ORDER — FENTANYL CITRATE 50 UG/ML
25-50 INJECTION, SOLUTION INTRAMUSCULAR; INTRAVENOUS
Status: DISCONTINUED | OUTPATIENT
Start: 2019-05-10 | End: 2019-05-10 | Stop reason: HOSPADM

## 2019-05-10 RX ORDER — CYCLOPENTOLAT/TROPIC/PHENYLEPH 1%-1%-2.5%
1 DROPS (EA) OPHTHALMIC (EYE)
Status: COMPLETED | OUTPATIENT
Start: 2019-05-10 | End: 2019-05-10

## 2019-05-10 RX ORDER — OFLOXACIN 3 MG/ML
1 SOLUTION/ DROPS OPHTHALMIC 4 TIMES DAILY
Qty: 5 ML | Refills: 0 | Status: ON HOLD | OUTPATIENT
Start: 2019-05-10 | End: 2019-08-31

## 2019-05-10 RX ORDER — PREDNISOLONE ACETATE 10 MG/ML
1 SUSPENSION/ DROPS OPHTHALMIC 4 TIMES DAILY
Qty: 5 ML | Refills: 0 | Status: ON HOLD | OUTPATIENT
Start: 2019-05-10 | End: 2019-08-31

## 2019-05-10 RX ORDER — KETOROLAC TROMETHAMINE 4 MG/ML
1 SOLUTION/ DROPS OPHTHALMIC 4 TIMES DAILY
Qty: 5 ML | Refills: 0 | Status: SHIPPED | OUTPATIENT
Start: 2019-05-10

## 2019-05-10 RX ORDER — LIDOCAINE HYDROCHLORIDE 20 MG/ML
INJECTION, SOLUTION INFILTRATION; PERINEURAL PRN
Status: DISCONTINUED | OUTPATIENT
Start: 2019-05-10 | End: 2019-05-10

## 2019-05-10 RX ORDER — TROPICAMIDE 10 MG/ML
1 SOLUTION/ DROPS OPHTHALMIC
Status: DISCONTINUED | OUTPATIENT
Start: 2019-05-10 | End: 2019-05-10 | Stop reason: HOSPADM

## 2019-05-10 RX ORDER — PROPOFOL 10 MG/ML
INJECTION, EMULSION INTRAVENOUS CONTINUOUS PRN
Status: DISCONTINUED | OUTPATIENT
Start: 2019-05-10 | End: 2019-05-10

## 2019-05-10 RX ORDER — BALANCED SALT SOLUTION 6.4; .75; .48; .3; 3.9; 1.7 MG/ML; MG/ML; MG/ML; MG/ML; MG/ML; MG/ML
SOLUTION OPHTHALMIC PRN
Status: DISCONTINUED | OUTPATIENT
Start: 2019-05-10 | End: 2019-05-10 | Stop reason: HOSPADM

## 2019-05-10 RX ORDER — MOXIFLOXACIN 5 MG/ML
SOLUTION/ DROPS OPHTHALMIC PRN
Status: DISCONTINUED | OUTPATIENT
Start: 2019-05-10 | End: 2019-05-10 | Stop reason: HOSPADM

## 2019-05-10 RX ORDER — NALOXONE HYDROCHLORIDE 0.4 MG/ML
.1-.4 INJECTION, SOLUTION INTRAMUSCULAR; INTRAVENOUS; SUBCUTANEOUS
Status: DISCONTINUED | OUTPATIENT
Start: 2019-05-10 | End: 2019-05-10 | Stop reason: HOSPADM

## 2019-05-10 RX ORDER — PROPARACAINE HYDROCHLORIDE 5 MG/ML
1 SOLUTION/ DROPS OPHTHALMIC ONCE
Status: DISCONTINUED | OUTPATIENT
Start: 2019-05-10 | End: 2019-05-10 | Stop reason: HOSPADM

## 2019-05-10 RX ADMIN — PROPOFOL: 10 INJECTION, EMULSION INTRAVENOUS at 15:33

## 2019-05-10 RX ADMIN — PROPOFOL 10 MG: 10 INJECTION, EMULSION INTRAVENOUS at 15:03

## 2019-05-10 RX ADMIN — EPHEDRINE SULFATE 5 MG: 50 INJECTION, SOLUTION INTRAVENOUS at 15:13

## 2019-05-10 RX ADMIN — Medication 1 DROP: at 13:37

## 2019-05-10 RX ADMIN — Medication 1 DROP: at 13:42

## 2019-05-10 RX ADMIN — LIDOCAINE HYDROCHLORIDE 80 MG: 20 INJECTION, SOLUTION INFILTRATION; PERINEURAL at 14:35

## 2019-05-10 RX ADMIN — PROPOFOL 100 MCG/KG/MIN: 10 INJECTION, EMULSION INTRAVENOUS at 14:35

## 2019-05-10 RX ADMIN — Medication 1 DROP: at 13:32

## 2019-05-10 RX ADMIN — SODIUM CHLORIDE, POTASSIUM CHLORIDE, SODIUM LACTATE AND CALCIUM CHLORIDE: 600; 310; 30; 20 INJECTION, SOLUTION INTRAVENOUS at 14:27

## 2019-05-10 ASSESSMENT — MIFFLIN-ST. JEOR: SCORE: 1591.75

## 2019-05-10 NOTE — OP NOTE
Date of Service: 5/10/2019    Attending: Geo Rodriguez MD  Fellow: Gaby Card MD    Pre procedure Diagnosis: visually significant cataract, left eye  Post procedure Diagnosis: same    Procedure:   1. Exam under anesthesia - both eyes  2. Complex cataract extraction with intraocular lens implantation, left eye    Complications: none  EBL: < 1cc  Anesthesia: MAC/retrobulbar  Implant: [id number] +21.0D    INDICATION FOR PROCEDURE   The patient has a history of Parkinson's disease and AAA with visually-significant cataract left eye that has been affecting their activities of daily living. The risks, including, but not limited to infection, loss of vision, loss of eye, need for more surgery, and bleeding, along with the benefits, alternatives, expectations, and the procedure itself were discussed at length with the patient who wished to proceed with surgery.     DESCRIPTION OF PROCEDURE   In the preoperative suite, the patient was identified, the surgical site marked and informed consent was obtained. The patient was then brought back to the operative suite where the appropriate anesthesia monitors were connected.  Intraoperative lens measurements and dilated fundus exam were performed:    Exam:  IOP: 14 OD, 12 OS    Portable SLE:  RE: cornea graft clear and compact, irregular pupil, intraocular lens clear  LE: Cornea clear, pharm dilated pupil, 3+ NSC    DFE:  RE:   ON: 0.9 pale  Mac: blunted flr  Vess: wnl  Periph: CRS inferior, nasal, tr elevation of scar, retina flat and attached otherwise    LE:  ON: 0.3 pink healthy rim  Mac: wnl  Vess: wnl  Periph: wnl    A scan:   LE axial length 24.99    Keratometry:  RE: 37.7/42@125  LE: 40/40.75@87  Axial length OS: 24.99mm    A retrobulbar block consisting of lidocaine/bupivicaine/hylenx was administered to the operative eye. The patient's eye was prepped and draped in the usual sterile fashion for ophthalmic surgery.   0.12 forceps and a supersharp blade were used to  make a paracentesis incision. 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 Due to poor red reflex as a result of patient's fixed, flexed neck, and there an oblique angle to the illuminating light, the decision was made to use trypan blue to enhance visualization of the cataract. Air was injected into the anterior chamber to protect the endothelium. Trypan blue was then injected to stain the anterior lens capsule. Healon was injected to fill the anterior chamber and remove any residual air or trypan blue.   A 2.85 mm keratome was used to make a temporal, triplanar clear corneal incision. Capsulorrhexis was completed with a bent cystitome and Utrata forceps. Hydrodissection of the nucleus was achieved with the straight hydrodissection cannula. The nucleus was removed via stop and chop technique.    The irrigation and aspiration handpiece was used to remove the cortex.  The remaining viscoelastic was removed using irrigation and aspiration. BSS on a cannula was used to hydrate the clear corneal and paracentesis incisions.  Weck-brian sponges were used to confirm there were no leaks from the incisions.   Subconjunctival ancef and decadron injections were placed in the inferior fornix. A patch and shield were taped over the eye. The patient was then taken to the recovery room in stable condition having tolerated the procedure well and discharged home in good condition.     Dr. Geo Rodriguez was the scrubbed and present for the entire case.

## 2019-05-10 NOTE — ANESTHESIA CARE TRANSFER NOTE
Patient: Sylvester Turk    Procedure(s):  Left Eye Phacoemulcification Clear Cornea with Standard Lens Implant  Exam under anesthesia eye(s)    Diagnosis: Cataract  Diagnosis Additional Information: No value filed.    Anesthesia Type:   No value filed.     Note:  Airway :Face Mask  Patient transferred to:PACU  Handoff Report: Identifed the Patient, Identified the Reponsible Provider, Reviewed the pertinent medical history, Discussed the surgical course, Reviewed Intra-OP anesthesia mangement and issues during anesthesia, Set expectations for post-procedure period and Allowed opportunity for questions and acknowledgement of understanding      Vitals: (Last set prior to Anesthesia Care Transfer)    CRNA VITALS  5/10/2019 1525 - 5/10/2019 1601      5/10/2019             Pulse:  61    SpO2:  100 %                Electronically Signed By: ADONIS Stark CRNA  May 10, 2019  4:01 PM

## 2019-05-10 NOTE — ANESTHESIA POSTPROCEDURE EVALUATION
Anesthesia POST Procedure Evaluation    Patient: Sylvester Turk   MRN:     [id number] Gender:   male   Age:    83 year old :      1935        Preoperative Diagnosis: Cataract   Procedure(s):  Left Eye Phacoemulcification Clear Cornea with Standard Lens Implant  Exam under anesthesia eye(s)   Postop Comments: No value filed.       Anesthesia Type:  MAC  No value filed.    Reportable Event: NO     PAIN: Uncomplicated   Sign Out status: Comfortable, Well controlled pain     PONV: No PONV   Sign Out status:  No Nausea or Vomiting     Neuro/Psych: Uneventful perioperative course   Sign Out Status: Preoperative baseline; Age appropriate mentation     Airway/Resp.: Uneventful perioperative course   Sign Out Status: Non labored breathing, age appropriate RR; Resp. Status within EXPECTED Parameters     CV: Uneventful perioperative course   Sign Out status: Appropriate BP and perfusion indices; Appropriate HR/Rhythm     Disposition:   Sign Out in:  PACU  Disposition:  Home  Recovery Course: Uneventful  Follow-Up: Not required           Last Anesthesia Record Vitals:  CRNA VITALS  5/10/2019 1525 - 5/10/2019 1613      5/10/2019             Pulse:  61    SpO2:  100 %          Last PACU Vitals:  Vitals Value Taken Time   /94 5/10/2019  3:58 PM   Temp 36.8  C (98.2  F) 5/10/2019  3:58 PM   Pulse 65 5/10/2019  3:58 PM   Resp 8 5/10/2019  3:58 PM   SpO2 100 % 5/10/2019  3:58 PM   Temp src Available 5/10/2019  3:45 PM   NIBP 126/81 5/10/2019  3:51 PM   Pulse 61 5/10/2019  3:55 PM   SpO2 100 % 5/10/2019  3:55 PM   Resp     Temp     Ht Rate 54 5/10/2019  3:53 PM   Temp 2           Electronically Signed By: Thao Mora MD, May 10, 2019, 4:13 PM

## 2019-05-10 NOTE — ANESTHESIA PREPROCEDURE EVALUATION
Anesthesia Pre-Procedure Evaluation    Patient: Sylvester Turk   MRN:     [id number] Gender:   male   Age:    83 year old :      1935        Preoperative Diagnosis: Cataract   Procedure(s):  Left Eye Phacoemulcification Clear Cornea with Standard Lens Implant     Past Medical History:   Diagnosis Date     Abdominal aortic aneurysm (AAA) (H)      Anxiety      Arthritis      Basal cell carcinoma      Benign prostatic hyperplasia with lower urinary tract symptoms      BPH (benign prostatic hyperplasia)      Dementia with Lewy bodies      Femur fracture (H)      Glaucoma      Hypothyroidism      Kidney stones      Parkinson's disease (H)      Parkinsons disease (H)       Past Surgical History:   Procedure Laterality Date     APPENDECTOMY       EYE SURGERY       OPEN REDUCTION INTERNAL FIXATION HIP BIPOLAR Left 12/15/2018    Procedure: Left hip hemiarthroplasty;  Surgeon: Ronny Tony MD;  Location: [facility] OR          Anesthesia Evaluation     .             ROS/MED HX    ENT/Pulmonary:       Neurologic:       Cardiovascular:     (+) -Peripheral Vascular Disease-- Other, --. : . . . :. .       METS/Exercise Tolerance:     Hematologic:         Musculoskeletal:         GI/Hepatic:         Renal/Genitourinary:         Endo:         Psychiatric:         Infectious Disease:         Malignancy:         Other:                         PHYSICAL EXAM:   Mental Status/Neuro:    Airway: Facies: Challenging  Mallampati: IV  Mouth/Opening: Minimal  TM distance: < 6 cm  Neck ROM: Immobile   Respiratory: Auscultation: CTAB     Resp. Rate: Normal     Resp. Effort: Normal      CV: Rhythm: Regular  Rate: Age appropriate  Heart: Normal Sounds   Comments:      Dental:  Dental Comments: Spouse indicates full dentition - very limited mouth opening 1.5 FB's - Hx. Of Parkinson's.                Lab Results   Component Value Date    WBC 8.1 2018    HGB 9.0 (L) 2018    HCT 27.6 (L) 2018     2018      "12/18/2018    POTASSIUM 4.2 12/18/2018    CHLORIDE 104 12/18/2018    CO2 23 12/18/2018    BUN 31 (H) 12/18/2018    CR 0.75 12/18/2018    GLC 97 12/18/2018    THI 7.5 (L) 12/18/2018       Preop Vitals  BP Readings from Last 3 Encounters:   05/10/19 (!) 137/97   02/09/19 (!) 157/103   12/18/18 111/67    Pulse Readings from Last 3 Encounters:   05/10/19 76   02/09/19 96      Resp Readings from Last 3 Encounters:   05/10/19 16   02/09/19 18   12/18/18 20    SpO2 Readings from Last 3 Encounters:   05/10/19 98%   02/09/19 100%   12/18/18 98%      Temp Readings from Last 1 Encounters:   05/10/19 36.7  C (98.1  F) (Oral)    Ht Readings from Last 1 Encounters:   05/10/19 1.88 m (6' 2\")      Wt Readings from Last 1 Encounters:   05/10/19 81.7 kg (180 lb 1.9 oz)    Estimated body mass index is 23.13 kg/m  as calculated from the following:    Height as of this encounter: 1.88 m (6' 2\").    Weight as of this encounter: 81.7 kg (180 lb 1.9 oz).     LDA:  Peripheral IV 05/10/19 Right Lower forearm (Active)   Site Assessment WDL 5/10/2019  1:11 PM   Line Status Saline locked 5/10/2019  1:11 PM   Phlebitis Scale 0-->no symptoms 5/10/2019  1:11 PM   Infiltration Scale 0 5/10/2019  1:11 PM   Number of days: 0            Assessment:   ASA SCORE: 3    NPO Status: > 6 hours since completed Solid Foods   Documentation: H&P complete; Preop Testing complete; Consents complete   Proceeding: Proceed without further delay  Tobacco Use:  NO Active use of Tobacco/UNKNOWN Tobacco use status     Plan:   Anes. Type:  MAC   Pre-Induction: Midazolam IV   Induction:  Not applicable   Airway: Native Airway   Access/Monitoring: PIV   Maintenance: N/a   Emergence: N/a   Logistics: Same Day Surgery     Postop Pain/Sedation Strategy:  Standard-Options: Opioids PRN     PONV Management:  Adult Risk Factors:, Non-Smoker, Postop Opioids  Prevention: Ondansetron     CONSENT: Direct conversation   Plan and risks discussed with: Patient; Spouse                 "            Juan Carlos Gomez,

## 2019-05-10 NOTE — DISCHARGE INSTRUCTIONS
Same-Day Surgery   Adult Discharge Orders & Instructions     For 24 hours after surgery:  1. Get plenty of rest.  A responsible adult must stay with you for at least 24 hours after you leave the hospital.   2. Pain medication can slow your reflexes. Do not drive or use heavy equipment.  If you have weakness or tingling, don't drive or use heavy equipment until this feeling goes away.  3. Mixing alcohol and pain medication can cause dizziness and slow your breathing. It can even be fatal. Do not drink alcohol while taking pain medication.  4. Avoid strenuous or risky activities.  Ask for help when climbing stairs.   5. You may feel lightheaded.  If so, sit for a few minutes before standing.  Have someone help you get up.   6. If you have nausea (feel sick to your stomach), drink only clear liquids such as apple juice, ginger ale, broth or 7-Up.  Rest may also help.  Be sure to drink enough fluids.  Move to a regular diet as you feel able. Take pain medications with a small amount of solid food, such as toast or crackers, to avoid nausea.   7. A slight fever is normal. Call the doctor if your fever is over 100 F (37.7 C) (taken under the tongue) or lasts longer than 24 hours.  8. You may have a dry mouth, muscle aches, trouble sleeping or a sore throat.  These symptoms should go away after 24 hours.  9. Do not make important or legal decisions.   Pain Management:      1. Take pain medication (if prescribed) for pain as directed by your physician.        2. WARNING: If the pain medication you have been prescribed contains Tylenol  (acetaminophen), DO NOT take additional doses of Tylenol (acetaminophen).     Call your doctor for any of the followin.  Signs of infection (fever, growing tenderness at the surgery site, severe pain, a large amount of drainage or bleeding, foul-smelling drainage, redness, swelling).    2.  It has been over 8 to 10 hours since surgery and you are still not able to urinate (pee).    3.   Headache for over 24 hours.    4.  Numbness, tingling or weakness the day after surgery (if you had spinal anesthesia).  To contact a doctor, call _____________________________________ or:      [phone] and ask for the Resident On Call for:          __________________________________________ (answered 24 hours a day)      Emergency Department:  [place] [facility]: [phone]  [place] [facility]: [phone]               Rev. 10/2014

## 2019-05-11 ENCOUNTER — OFFICE VISIT (OUTPATIENT)
Dept: OPHTHALMOLOGY | Facility: CLINIC | Age: 84
End: 2019-05-11
Payer: COMMERCIAL

## 2019-05-11 DIAGNOSIS — Z96.1 PSEUDOPHAKIA OF BOTH EYES: Primary | ICD-10-CM

## 2019-05-11 ASSESSMENT — TONOMETRY
IOP_METHOD: ICARE
OS_IOP_MMHG: 08
OD_IOP_MMHG: 08

## 2019-05-11 ASSESSMENT — REFRACTION_WEARINGRX
OD_SPHERE: -2.25
OS_CYLINDER: +0.50
OD_CYLINDER: +5.00
OS_AXIS: 160
OD_ADD: +2.50
OS_ADD: +2.50
OS_SPHERE: +2.00
OD_AXIS: 020

## 2019-05-11 ASSESSMENT — CONF VISUAL FIELD
OD_INFERIOR_TEMPORAL_RESTRICTION: 3
OD_SUPERIOR_NASAL_RESTRICTION: 1
OD_SUPERIOR_TEMPORAL_RESTRICTION: 1
OD_INFERIOR_NASAL_RESTRICTION: 3
OS_NORMAL: 1

## 2019-05-11 ASSESSMENT — VISUAL ACUITY
OS_SC: 20/50
OD_SC: CF @ 2'
METHOD: SNELLEN - LINEAR

## 2019-05-11 ASSESSMENT — EXTERNAL EXAM - LEFT EYE: OS_EXAM: NORMAL

## 2019-05-11 ASSESSMENT — SLIT LAMP EXAM - LIDS
COMMENTS: NORMAL
COMMENTS: NORMAL

## 2019-05-11 ASSESSMENT — EXTERNAL EXAM - RIGHT EYE: OD_EXAM: NORMAL

## 2019-05-11 NOTE — PATIENT INSTRUCTIONS
Eye Drop Instructions:    Start your eye drops today - left eye.  1. Prednisolone (pink/white top) - 1 drop 4 times per day.  2. Ofloxacin (tan top) - 1 drop 4 times per day.  3. Ketorolac (gray top) - 1 drop 4 times per day.    No eye rubbing - left eye.  Wear the shield over the eye at bedtime and sunglasses when outdoors.  Space out eye drops by at least 3-5 minutes apart.     If you have any worsening eye pain, redness, flashes/floaters, or decreased vision, please call the Eye Clinic at [phone].    Continue your other drops for the right eye as previously prescribed.  1. Cosopt = Dorzolamide/Timolol (blue top) - 1 drop 2 times per day.  2. Brimonidine (purple top) - 1 drop 2 times per day.   3. Latanoprost (green top) - 1 drop at bedtime.

## 2019-05-11 NOTE — PROGRESS NOTES
CC:  Post-op Ce/IOL OS    HPI:  Referred by [facility] for cataract left eye.     Interval: Here for POD1 s/p CE/IOL left eye. Doing well, got sleep last night. No eye pain currently. Here with wife and daughter.    Gtts:  Latanoprost at bedtime RE  Brimonidine BID RE  Cosopt BID RE    No drops left eye since surgery yesterday.    POH:  H/o metal in right eye 30+ years ago right eye   Traumatic glaucoma right eye  Complicated cataract surgery right eye 1982  IOL dislocation s/p replacement right eye at [place] 2016  --> s/p CE/IOL left eye 5/10/19    PMH:  Parkinson's  Enlarged prostate on finasteride, flomax    A/P:  1. Pseudophakia, OS  -POD1 - doing well  -IOP: 08  -start pred QID, ketorolac QID, ofloxacin QID left eye  -shield at bedtime  -return precautions reviewed  -f/u 1 week    2. Traumatic glaucoma right eye  -IOP: 08  -continue cosopt, brimonidine, latanoprost  -sees an optometrist currently, consider glaucoma eval in future, likely poor surgery candidate due to poor [facility] potential, but could consider [facility]     3. S/p PKP x 2 right eye   -not currently on pred  -graft appears clear, monitor    F/u 1 week. Patient's family prefers to have patient follow up locally. If looking good at one week, then would stop ofloxacin and start pred/ketorolac taper (TID x 1 week, BID x 1 week, qday x 1 week, then stop).     Will fax today's note to the local provider. If unable to get an appointment with them, will keep appointment with Dr. Rodriguez next week.     [facility]  Dr. Joey Samson, [place]  [phone]    Malgorzata Etienne MD  PGY-5, Cornea Fellow  Ophthalmology    Complete documentation of historical and exam elements from today's encounter can be found in the full encounter summary report (not reduplicated in this progress note). I personally obtained the chief complaint(s) and history of present illness.  I confirmed and edited as necessary the review of systems, past medical/surgical history, family  history, social history, and examination findings as documented by others; and I examined the patient myself. I personally reviewed the relevant tests, images, and reports as documented above. I formulated and edited as necessary the assessment and plan and discussed the findings and management plan with the patient and family.     Malgorzata Etienne MD  Cornea Fellow

## 2019-05-11 NOTE — NURSING NOTE
Chief Complaints and History of Present Illnesses   Patient presents with     Post Op (Ophthalmology) Left Eye     Chief Complaint(s) and History of Present Illness(es)     Post Op (Ophthalmology) Left Eye     Laterality: left eye    Onset: 1 day ago    Pain scale: 0/10              Comments     POD#1 LE s/p CE/IOL  Ocular meds: Latanoprost qhs RE, Brimonidine bid RE, cosopt qDay RE   Pt slept well last night. No eye pain yesterday or today.    Lucas RUBY May 11, 2019 9:50 AM

## 2019-05-24 ENCOUNTER — TELEPHONE (OUTPATIENT)
Dept: OPHTHALMOLOGY | Facility: CLINIC | Age: 84
End: 2019-05-24

## 2019-05-24 NOTE — TELEPHONE ENCOUNTER
Pt was using prednisolone and ketorolac and ofloxacin until post op visit with local eye provider until may 15th  Ketorolac discontinued at that visit  Prednisolone was drop to two times a day then discontinued 2 days ago  Ofloxacin stop 2 days ago    Reviewed eye doctors have different regimens on post-op eye care following cataract surgery    Reviewed if eye comfortable and no vision changes to follow local eye providers recommendations    Call local provider or here over weekend for any acute eye pain vision changes    Daughter verbally demonstrated understanding  Home Hartmann RN 4:22 PM friday 05/24/19    Note to Dr. Lowell FRANCO [facility] Call Center    Phone Message    May a detailed message be left on voicemail: yes    Reason for Call: Other: Pt had cataract surgery with Dr Rodriguez and then was sent back to see his [facility] ophthamologist thereafter.  the Pt daughter is concerned because the [facility] Dr told Pt to stop using drops after one week and daughter remembers when her mother had cataract surgery she had to take them for a month.  Can staff call daughter Dot at [phone] to confirm for her that this difference in drop usage is ok?      Action Taken: Message routed to:  [facility] ([facility] eye clinic

## 2019-08-30 ENCOUNTER — APPOINTMENT (OUTPATIENT)
Dept: CT IMAGING | Facility: CLINIC | Age: 84
End: 2019-08-30
Attending: NURSE PRACTITIONER
Payer: MEDICARE

## 2019-08-30 ENCOUNTER — HOSPITAL ENCOUNTER (INPATIENT)
Facility: CLINIC | Age: 84
LOS: 5 days | Discharge: HOME HEALTH | DRG: 417 | End: 2019-09-05
Attending: INTERNAL MEDICINE | Admitting: HOSPITALIST
Payer: MEDICARE

## 2019-08-30 ENCOUNTER — HOSPITAL ENCOUNTER (EMERGENCY)
Facility: CLINIC | Age: 84
Discharge: ANOTHER HOSPITAL | End: 2019-08-30
Attending: NURSE PRACTITIONER | Admitting: NURSE PRACTITIONER
Payer: MEDICARE

## 2019-08-30 VITALS
OXYGEN SATURATION: 100 % | TEMPERATURE: 98.3 F | DIASTOLIC BLOOD PRESSURE: 120 MMHG | RESPIRATION RATE: 28 BRPM | SYSTOLIC BLOOD PRESSURE: 197 MMHG

## 2019-08-30 DIAGNOSIS — G20.A1 PARKINSON DISEASE (H): ICD-10-CM

## 2019-08-30 DIAGNOSIS — K82.8 THICKENING OF WALL OF GALLBLADDER: ICD-10-CM

## 2019-08-30 DIAGNOSIS — K80.50 CHOLEDOCHOLITHIASIS: Primary | ICD-10-CM

## 2019-08-30 DIAGNOSIS — R10.13 ABDOMINAL PAIN, EPIGASTRIC: ICD-10-CM

## 2019-08-30 LAB
ALBUMIN SERPL-MCNC: 3.1 G/DL (ref 3.4–5)
ALP SERPL-CCNC: 377 U/L (ref 40–150)
ALT SERPL W P-5'-P-CCNC: 142 U/L (ref 0–70)
ANION GAP SERPL CALCULATED.3IONS-SCNC: 5 MMOL/L (ref 3–14)
AST SERPL W P-5'-P-CCNC: 145 U/L (ref 0–45)
BASOPHILS # BLD AUTO: 0.1 10E9/L (ref 0–0.2)
BASOPHILS NFR BLD AUTO: 0.9 %
BILIRUB SERPL-MCNC: 1 MG/DL (ref 0.2–1.3)
BUN SERPL-MCNC: 18 MG/DL (ref 7–30)
CALCIUM SERPL-MCNC: 8.7 MG/DL (ref 8.5–10.1)
CHLORIDE SERPL-SCNC: 103 MMOL/L (ref 94–109)
CO2 SERPL-SCNC: 31 MMOL/L (ref 20–32)
CREAT BLD-MCNC: 0.8 MG/DL (ref 0.66–1.25)
CREAT SERPL-MCNC: 0.69 MG/DL (ref 0.66–1.25)
DIFFERENTIAL METHOD BLD: ABNORMAL
EOSINOPHIL # BLD AUTO: 0.3 10E9/L (ref 0–0.7)
EOSINOPHIL NFR BLD AUTO: 3.5 %
ERYTHROCYTE [DISTWIDTH] IN BLOOD BY AUTOMATED COUNT: 14.6 % (ref 10–15)
GFR SERPL CREATININE-BSD FRML MDRD: 88 ML/MIN/{1.73_M2}
GFR SERPL CREATININE-BSD FRML MDRD: >90 ML/MIN/{1.73_M2}
GLUCOSE SERPL-MCNC: 116 MG/DL (ref 70–99)
HCT VFR BLD AUTO: 37.8 % (ref 40–53)
HGB BLD-MCNC: 11.7 G/DL (ref 13.3–17.7)
IMM GRANULOCYTES # BLD: 0.1 10E9/L (ref 0–0.4)
IMM GRANULOCYTES NFR BLD: 0.8 %
LYMPHOCYTES # BLD AUTO: 1.5 10E9/L (ref 0.8–5.3)
LYMPHOCYTES NFR BLD AUTO: 20.2 %
MCH RBC QN AUTO: 26.1 PG (ref 26.5–33)
MCHC RBC AUTO-ENTMCNC: 31 G/DL (ref 31.5–36.5)
MCV RBC AUTO: 84 FL (ref 78–100)
MONOCYTES # BLD AUTO: 1 10E9/L (ref 0–1.3)
MONOCYTES NFR BLD AUTO: 12.9 %
NEUTROPHILS # BLD AUTO: 4.6 10E9/L (ref 1.6–8.3)
NEUTROPHILS NFR BLD AUTO: 61.7 %
NRBC # BLD AUTO: 0 10*3/UL
NRBC BLD AUTO-RTO: 0 /100
PLATELET # BLD AUTO: 201 10E9/L (ref 150–450)
POTASSIUM SERPL-SCNC: 3.5 MMOL/L (ref 3.4–5.3)
PROT SERPL-MCNC: 6.5 G/DL (ref 6.8–8.8)
RBC # BLD AUTO: 4.48 10E12/L (ref 4.4–5.9)
SODIUM SERPL-SCNC: 139 MMOL/L (ref 133–144)
WBC # BLD AUTO: 7.4 10E9/L (ref 4–11)

## 2019-08-30 PROCEDURE — 80053 COMPREHEN METABOLIC PANEL: CPT | Performed by: NURSE PRACTITIONER

## 2019-08-30 PROCEDURE — 93005 ELECTROCARDIOGRAM TRACING: CPT

## 2019-08-30 PROCEDURE — 74177 CT ABD & PELVIS W/CONTRAST: CPT

## 2019-08-30 PROCEDURE — 85025 COMPLETE CBC W/AUTO DIFF WBC: CPT | Performed by: NURSE PRACTITIONER

## 2019-08-30 PROCEDURE — 25000128 H RX IP 250 OP 636: Performed by: NURSE PRACTITIONER

## 2019-08-30 PROCEDURE — 25000125 ZZHC RX 250: Performed by: NURSE PRACTITIONER

## 2019-08-30 PROCEDURE — 99285 EMERGENCY DEPT VISIT HI MDM: CPT | Mod: 25

## 2019-08-30 PROCEDURE — 82565 ASSAY OF CREATININE: CPT | Mod: 91

## 2019-08-30 RX ORDER — IOPAMIDOL 755 MG/ML
500 INJECTION, SOLUTION INTRAVASCULAR ONCE
Status: COMPLETED | OUTPATIENT
Start: 2019-08-30 | End: 2019-08-30

## 2019-08-30 RX ADMIN — SODIUM CHLORIDE 63 ML: 9 INJECTION, SOLUTION INTRAVENOUS at 20:16

## 2019-08-30 RX ADMIN — IOPAMIDOL 91 ML: 755 INJECTION, SOLUTION INTRAVENOUS at 20:16

## 2019-08-30 NOTE — ED NOTES
Bed: ED28  Expected date: 8/30/19  Expected time: 6:43 PM  Means of arrival:   Comments:  Amie [phone] M

## 2019-08-30 NOTE — ED TRIAGE NOTES
Arrives to ED via EMS, family reports epigastric pain ongoing 2 wks, abd xray shies bowel obstruction. HX AAA. [facility] in [place]. Family reports swelling in LE's.

## 2019-08-31 ENCOUNTER — APPOINTMENT (OUTPATIENT)
Dept: ULTRASOUND IMAGING | Facility: CLINIC | Age: 84
DRG: 417 | End: 2019-08-31
Attending: HOSPITALIST
Payer: MEDICARE

## 2019-08-31 PROBLEM — K80.50 CHOLEDOCHOLITHIASIS: Status: ACTIVE | Noted: 2019-08-31

## 2019-08-31 LAB
ALBUMIN SERPL-MCNC: 3 G/DL (ref 3.4–5)
ALP SERPL-CCNC: 347 U/L (ref 40–150)
ALT SERPL W P-5'-P-CCNC: 134 U/L (ref 0–70)
ANION GAP SERPL CALCULATED.3IONS-SCNC: 6 MMOL/L (ref 3–14)
AST SERPL W P-5'-P-CCNC: 118 U/L (ref 0–45)
BASOPHILS # BLD AUTO: 0 10E9/L (ref 0–0.2)
BASOPHILS NFR BLD AUTO: 0.4 %
BILIRUB SERPL-MCNC: 0.7 MG/DL (ref 0.2–1.3)
BUN SERPL-MCNC: 14 MG/DL (ref 7–30)
CALCIUM SERPL-MCNC: 8.5 MG/DL (ref 8.5–10.1)
CHLORIDE SERPL-SCNC: 105 MMOL/L (ref 94–109)
CO2 SERPL-SCNC: 28 MMOL/L (ref 20–32)
CREAT SERPL-MCNC: 0.62 MG/DL (ref 0.66–1.25)
DIFFERENTIAL METHOD BLD: ABNORMAL
EOSINOPHIL # BLD AUTO: 0.2 10E9/L (ref 0–0.7)
EOSINOPHIL NFR BLD AUTO: 2.9 %
ERYTHROCYTE [DISTWIDTH] IN BLOOD BY AUTOMATED COUNT: 15 % (ref 10–15)
GFR SERPL CREATININE-BSD FRML MDRD: >90 ML/MIN/{1.73_M2}
GLUCOSE SERPL-MCNC: 90 MG/DL (ref 70–99)
HCT VFR BLD AUTO: 35.9 % (ref 40–53)
HGB BLD-MCNC: 11.9 G/DL (ref 13.3–17.7)
IMM GRANULOCYTES # BLD: 0.1 10E9/L (ref 0–0.4)
IMM GRANULOCYTES NFR BLD: 0.6 %
INR PPP: 1.07 (ref 0.86–1.14)
INTERPRETATION ECG - MUSE: NORMAL
LYMPHOCYTES # BLD AUTO: 2.3 10E9/L (ref 0.8–5.3)
LYMPHOCYTES NFR BLD AUTO: 28.1 %
MCH RBC QN AUTO: 26.8 PG (ref 26.5–33)
MCHC RBC AUTO-ENTMCNC: 33.1 G/DL (ref 31.5–36.5)
MCV RBC AUTO: 81 FL (ref 78–100)
MONOCYTES # BLD AUTO: 1 10E9/L (ref 0–1.3)
MONOCYTES NFR BLD AUTO: 12.1 %
NEUTROPHILS # BLD AUTO: 4.6 10E9/L (ref 1.6–8.3)
NEUTROPHILS NFR BLD AUTO: 55.9 %
NRBC # BLD AUTO: 0 10*3/UL
NRBC BLD AUTO-RTO: 0 /100
PLATELET # BLD AUTO: 181 10E9/L (ref 150–450)
POTASSIUM SERPL-SCNC: 3.6 MMOL/L (ref 3.4–5.3)
PROT SERPL-MCNC: 6.2 G/DL (ref 6.8–8.8)
RBC # BLD AUTO: 4.44 10E12/L (ref 4.4–5.9)
SODIUM SERPL-SCNC: 139 MMOL/L (ref 133–144)
WBC # BLD AUTO: 8.3 10E9/L (ref 4–11)

## 2019-08-31 PROCEDURE — 25000128 H RX IP 250 OP 636: Performed by: INTERNAL MEDICINE

## 2019-08-31 PROCEDURE — 36415 COLL VENOUS BLD VENIPUNCTURE: CPT | Performed by: HOSPITALIST

## 2019-08-31 PROCEDURE — 85610 PROTHROMBIN TIME: CPT | Performed by: HOSPITALIST

## 2019-08-31 PROCEDURE — 25000132 ZZH RX MED GY IP 250 OP 250 PS 637: Mod: GY | Performed by: INTERNAL MEDICINE

## 2019-08-31 PROCEDURE — 85025 COMPLETE CBC W/AUTO DIFF WBC: CPT | Performed by: HOSPITALIST

## 2019-08-31 PROCEDURE — 99223 1ST HOSP IP/OBS HIGH 75: CPT | Mod: AI | Performed by: HOSPITALIST

## 2019-08-31 PROCEDURE — 12000000 ZZH R&B MED SURG/OB

## 2019-08-31 PROCEDURE — 80053 COMPREHEN METABOLIC PANEL: CPT | Performed by: HOSPITALIST

## 2019-08-31 PROCEDURE — 76705 ECHO EXAM OF ABDOMEN: CPT

## 2019-08-31 PROCEDURE — 25000132 ZZH RX MED GY IP 250 OP 250 PS 637: Mod: GY | Performed by: HOSPITALIST

## 2019-08-31 PROCEDURE — 25800030 ZZH RX IP 258 OP 636: Performed by: HOSPITALIST

## 2019-08-31 RX ORDER — LEVOTHYROXINE SODIUM 50 UG/1
50 TABLET ORAL DAILY
Status: DISCONTINUED | OUTPATIENT
Start: 2019-08-31 | End: 2019-09-05 | Stop reason: HOSPADM

## 2019-08-31 RX ORDER — ONDANSETRON 2 MG/ML
4 INJECTION INTRAMUSCULAR; INTRAVENOUS EVERY 6 HOURS PRN
Status: DISCONTINUED | OUTPATIENT
Start: 2019-08-31 | End: 2019-09-05 | Stop reason: HOSPADM

## 2019-08-31 RX ORDER — ACETAMINOPHEN 650 MG/1
650 SUPPOSITORY RECTAL EVERY 4 HOURS PRN
Status: DISCONTINUED | OUTPATIENT
Start: 2019-08-31 | End: 2019-09-03

## 2019-08-31 RX ORDER — DORZOLAMIDE HYDROCHLORIDE AND TIMOLOL MALEATE 20; 5 MG/ML; MG/ML
1 SOLUTION/ DROPS OPHTHALMIC 2 TIMES DAILY
Status: DISCONTINUED | OUTPATIENT
Start: 2019-08-31 | End: 2019-09-05 | Stop reason: HOSPADM

## 2019-08-31 RX ORDER — BACLOFEN 10 MG/1
10 TABLET ORAL 2 TIMES DAILY
Status: DISCONTINUED | OUTPATIENT
Start: 2019-08-31 | End: 2019-08-31

## 2019-08-31 RX ORDER — CARBIDOPA AND LEVODOPA 25; 100 MG/1; MG/1
1 TABLET, EXTENDED RELEASE ORAL 3 TIMES DAILY
Status: DISCONTINUED | OUTPATIENT
Start: 2019-08-31 | End: 2019-08-31

## 2019-08-31 RX ORDER — CARBOXYMETHYLCELLULOSE SODIUM 5 MG/ML
1 SOLUTION/ DROPS OPHTHALMIC AT BEDTIME
Status: DISCONTINUED | OUTPATIENT
Start: 2019-08-31 | End: 2019-09-05 | Stop reason: HOSPADM

## 2019-08-31 RX ORDER — OXYCODONE HYDROCHLORIDE 5 MG/1
5 TABLET ORAL
Status: DISCONTINUED | OUTPATIENT
Start: 2019-08-31 | End: 2019-09-03

## 2019-08-31 RX ORDER — LANOLIN ALCOHOL/MO/W.PET/CERES
3 CREAM (GRAM) TOPICAL
Status: DISCONTINUED | OUTPATIENT
Start: 2019-08-31 | End: 2019-09-05 | Stop reason: HOSPADM

## 2019-08-31 RX ORDER — LORAZEPAM 0.5 MG/1
0.25 TABLET ORAL 2 TIMES DAILY PRN
Status: DISCONTINUED | OUTPATIENT
Start: 2019-08-31 | End: 2019-08-31

## 2019-08-31 RX ORDER — LIDOCAINE 40 MG/G
CREAM TOPICAL
Status: DISCONTINUED | OUTPATIENT
Start: 2019-08-31 | End: 2019-09-05 | Stop reason: HOSPADM

## 2019-08-31 RX ORDER — BISMUTH SUBSALICYLATE 262 MG/1
2 TABLET, CHEWABLE ORAL
COMMUNITY

## 2019-08-31 RX ORDER — NALOXONE HYDROCHLORIDE 0.4 MG/ML
.1-.4 INJECTION, SOLUTION INTRAMUSCULAR; INTRAVENOUS; SUBCUTANEOUS
Status: DISCONTINUED | OUTPATIENT
Start: 2019-08-31 | End: 2019-09-01

## 2019-08-31 RX ORDER — ACETAMINOPHEN 325 MG/1
650 TABLET ORAL EVERY 4 HOURS PRN
Status: DISCONTINUED | OUTPATIENT
Start: 2019-08-31 | End: 2019-09-05 | Stop reason: HOSPADM

## 2019-08-31 RX ORDER — ASPIRIN 81 MG/1
81 TABLET ORAL DAILY
COMMUNITY

## 2019-08-31 RX ORDER — HYDROMORPHONE HYDROCHLORIDE 1 MG/ML
0.2 INJECTION, SOLUTION INTRAMUSCULAR; INTRAVENOUS; SUBCUTANEOUS
Status: DISCONTINUED | OUTPATIENT
Start: 2019-08-31 | End: 2019-09-03 | Stop reason: ALTCHOICE

## 2019-08-31 RX ORDER — FINASTERIDE 5 MG/1
5 TABLET, FILM COATED ORAL DAILY
Status: DISCONTINUED | OUTPATIENT
Start: 2019-08-31 | End: 2019-09-05 | Stop reason: HOSPADM

## 2019-08-31 RX ORDER — POLYETHYLENE GLYCOL 3350 17 G/17G
17 POWDER, FOR SOLUTION ORAL DAILY PRN
Status: DISCONTINUED | OUTPATIENT
Start: 2019-08-31 | End: 2019-09-05 | Stop reason: HOSPADM

## 2019-08-31 RX ORDER — LATANOPROST 50 UG/ML
1 SOLUTION/ DROPS OPHTHALMIC AT BEDTIME
Status: DISCONTINUED | OUTPATIENT
Start: 2019-08-31 | End: 2019-09-05 | Stop reason: HOSPADM

## 2019-08-31 RX ORDER — AMOXICILLIN 250 MG
2 CAPSULE ORAL 2 TIMES DAILY PRN
Status: DISCONTINUED | OUTPATIENT
Start: 2019-08-31 | End: 2019-09-05 | Stop reason: HOSPADM

## 2019-08-31 RX ORDER — BISACODYL 10 MG
10 SUPPOSITORY, RECTAL RECTAL DAILY PRN
Status: DISCONTINUED | OUTPATIENT
Start: 2019-08-31 | End: 2019-09-05 | Stop reason: HOSPADM

## 2019-08-31 RX ORDER — AMOXICILLIN 250 MG
1 CAPSULE ORAL 2 TIMES DAILY PRN
COMMUNITY

## 2019-08-31 RX ORDER — BRIMONIDINE TARTRATE 2 MG/ML
1 SOLUTION/ DROPS OPHTHALMIC 2 TIMES DAILY
Status: DISCONTINUED | OUTPATIENT
Start: 2019-08-31 | End: 2019-09-05 | Stop reason: HOSPADM

## 2019-08-31 RX ORDER — LORATADINE 10 MG/1
10 TABLET ORAL DAILY
COMMUNITY

## 2019-08-31 RX ORDER — PIPERACILLIN SODIUM, TAZOBACTAM SODIUM 3; .375 G/15ML; G/15ML
3.38 INJECTION, POWDER, LYOPHILIZED, FOR SOLUTION INTRAVENOUS EVERY 6 HOURS
Status: DISCONTINUED | OUTPATIENT
Start: 2019-08-31 | End: 2019-09-05 | Stop reason: HOSPADM

## 2019-08-31 RX ORDER — ACETAMINOPHEN 325 MG/1
975 TABLET ORAL EVERY 8 HOURS PRN
COMMUNITY

## 2019-08-31 RX ORDER — ONDANSETRON 4 MG/1
4 TABLET, ORALLY DISINTEGRATING ORAL EVERY 6 HOURS PRN
Status: DISCONTINUED | OUTPATIENT
Start: 2019-08-31 | End: 2019-09-05 | Stop reason: HOSPADM

## 2019-08-31 RX ORDER — GABAPENTIN 300 MG/1
300 CAPSULE ORAL AT BEDTIME
Status: DISCONTINUED | OUTPATIENT
Start: 2019-08-31 | End: 2019-09-05 | Stop reason: HOSPADM

## 2019-08-31 RX ORDER — TRAMADOL HYDROCHLORIDE 50 MG/1
25 TABLET ORAL EVERY 4 HOURS PRN
Status: ON HOLD | COMMUNITY
End: 2019-09-05

## 2019-08-31 RX ORDER — AMOXICILLIN 250 MG
1 CAPSULE ORAL 2 TIMES DAILY PRN
Status: DISCONTINUED | OUTPATIENT
Start: 2019-08-31 | End: 2019-09-05 | Stop reason: HOSPADM

## 2019-08-31 RX ORDER — SODIUM CHLORIDE 9 MG/ML
INJECTION, SOLUTION INTRAVENOUS CONTINUOUS
Status: DISCONTINUED | OUTPATIENT
Start: 2019-08-31 | End: 2019-09-04

## 2019-08-31 RX ORDER — CARBIDOPA AND LEVODOPA 25; 100 MG/1; MG/1
1 TABLET, EXTENDED RELEASE ORAL 3 TIMES DAILY
Status: DISCONTINUED | OUTPATIENT
Start: 2019-08-31 | End: 2019-09-05 | Stop reason: HOSPADM

## 2019-08-31 RX ADMIN — CARBIDOPA AND LEVODOPA 1 TABLET: 25; 100 TABLET, EXTENDED RELEASE ORAL at 21:30

## 2019-08-31 RX ADMIN — CAMPHOR AND MENTHOL: .6; .6 LOTION TOPICAL at 21:37

## 2019-08-31 RX ADMIN — FINASTERIDE 5 MG: 5 TABLET, FILM COATED ORAL at 19:59

## 2019-08-31 RX ADMIN — LEVOTHYROXINE SODIUM 50 MCG: 50 TABLET ORAL at 16:33

## 2019-08-31 RX ADMIN — CARBIDOPA AND LEVODOPA 1 TABLET: 25; 100 TABLET, EXTENDED RELEASE ORAL at 16:33

## 2019-08-31 RX ADMIN — Medication 1 DROP: at 21:30

## 2019-08-31 RX ADMIN — GABAPENTIN 300 MG: 300 CAPSULE ORAL at 21:30

## 2019-08-31 RX ADMIN — PIPERACILLIN AND TAZOBACTAM 3.38 G: 3; .375 INJECTION, POWDER, LYOPHILIZED, FOR SOLUTION INTRAVENOUS at 23:21

## 2019-08-31 RX ADMIN — SODIUM CHLORIDE: 9 INJECTION, SOLUTION INTRAVENOUS at 00:43

## 2019-08-31 RX ADMIN — BRIMONIDINE TARTRATE 1 DROP: 2 SOLUTION/ DROPS OPHTHALMIC at 16:33

## 2019-08-31 RX ADMIN — PIPERACILLIN AND TAZOBACTAM 3.38 G: 3; .375 INJECTION, POWDER, LYOPHILIZED, FOR SOLUTION INTRAVENOUS at 17:21

## 2019-08-31 RX ADMIN — SODIUM CHLORIDE: 9 INJECTION, SOLUTION INTRAVENOUS at 21:47

## 2019-08-31 RX ADMIN — PIPERACILLIN AND TAZOBACTAM 3.38 G: 3; .375 INJECTION, POWDER, LYOPHILIZED, FOR SOLUTION INTRAVENOUS at 13:03

## 2019-08-31 RX ADMIN — DORZOLAMIDE HYDROCHLORIDE AND TIMOLOL MALEATE 1 DROP: 20; 5 SOLUTION/ DROPS OPHTHALMIC at 09:00

## 2019-08-31 RX ADMIN — DORZOLAMIDE HYDROCHLORIDE AND TIMOLOL MALEATE 1 DROP: 20; 5 SOLUTION/ DROPS OPHTHALMIC at 16:32

## 2019-08-31 RX ADMIN — LATANOPROST 1 DROP: 50 SOLUTION/ DROPS OPHTHALMIC at 20:01

## 2019-08-31 RX ADMIN — BRIMONIDINE TARTRATE 1 DROP: 2 SOLUTION/ DROPS OPHTHALMIC at 09:00

## 2019-08-31 ASSESSMENT — ACTIVITIES OF DAILY LIVING (ADL)
ADLS_ACUITY_SCORE: 23.5
ADLS_ACUITY_SCORE: 19.5
ADLS_ACUITY_SCORE: 17.5
ADLS_ACUITY_SCORE: 17.5
ADLS_ACUITY_SCORE: 19.5

## 2019-08-31 NOTE — ED PROVIDER NOTES
"  History     Chief Complaint:  Abdominal Pain    HPI   Sylvester Turk is a 83 year old male with a history of dementia, AAA, and parkinson's who presents with abdominal pain. The patient's family reports that at his memory care home did an abdominal x-ray Thursday morning and were called about the results tonight. They were told to come into the ED due to the results. The patient's family reports that he has been having back pain. He has a known \"7cm\" AAA.They note that he typically uses a wheelchair but have kept his legs elevated all day. They do not know when his last bowel movement was but believe it was more than 48 hours ago. They deny recent falls. They report that he was recently put on nuplazid for hallucinations about 5-6 weeks ago.    Allergies:  No known drug allergies    Medications:    Nuplazid  Lioresal  Sinemet  Proscar  Neurontin  Synthroid  Ativan  Seroquel  Desyrel    Past Medical History:    Abdominal aortic aneurysm  Anxiety  Arthritis  Basal cell carcinoma  Benign prostatic hyperplasia  Dementia  Femur fracture  Glaucoma  Hypothyroidism  Kidney Stone  Parkinson's disease    Past Surgical History:    Appendectomy  Cataract IOL  Eye surgery  Open reduction internal fixation (L)  Phacoemulsification clear cornea with standard intraocular lens implant (L)    Family History:    History reviewed. No pertinent family history.     Social History:  Smoking status: Never  PCP: Adolfo Tony  Presents to the ED with family  Marital Status:   [2]    Review of Systems   Unable to perform ROS: Dementia       Physical Exam     Patient Vitals for the past 24 hrs:   BP Temp Temp src Heart Rate Resp SpO2   08/30/19 2100 (!) 197/120 -- -- -- 28 100 %   08/30/19 1854 -- 98.3  F (36.8  C) Axillary 64 18 99 %     Physical Exam  General: Alert, No obvious discomfort, well kept, resting in bed  Eyes: PERRL, conjunctivae pink no scleral icterus or conjunctival injection  ENT:   Moist mucus membranes, posterior " oropharynx clear without erythema or exudates, No lymphadenopathy, Normal voice  Resp:  Lungs clear to auscultation bilaterally, no crackles/rubs/wheezes. Good air movement  CV:  Normal rate and rhythm, no murmurs/rubs/gallops  GI:  Abdomen soft and non-distended.  Normoactive BS.  mild tenderness,  No guarding or rebound, No masses (family states that he was complaining about epigastric pain before)  Skin:  Warm, dry.  No rashes or petechiae  Musculoskeletal: No peripheral edema or calf tenderness, Normal gross ROM   Neuro: At baseline per family, normal sensation  Psychiatric: cooperative, does not open eyes for me but will respond to family    Emergency Department Course   Imaging:  Radiographic findings were communicated with the patient who voiced understanding of the findings.    Abd/pelvis CT, IV contast only TRAUMA/AAA  1.  Gallbladder wall thickening. There is significant dilatation of the common bile duct which measures up to 2.2 cm proximally with intrahepatic bile duct dilatation. No pancreatic masses. ERCP is recommended in further evaluation to evaluate the   ampulla.  2.  7.6 cm infrarenal abdominal aortic aneurysm with a large amount of mural thrombus. No retroperitoneal hemorrhage.  3.  No evidence of bowel obstruction.  4.  Left hip arthroplasty.  5.  Nonobstructing renal stones the largest in the lower pole left kidney measuring 5 mm.  As read by Radiology.    Laboratory:  CBC: HGB 11.7 (L) o/w WNL (WBC 7.4, )  Creatinine POCT (Resulted: 1940): 0.8  CMP (Resulted: 2017): Glucose 116 (H), Albumin 3.1 (L), Protein total 6.5 (L), Alkphos 377 (H),  (H),  (H) o/w WNL (Creatinine 0.69)    Emergency Department Course:  Past medical records, nursing notes, and vitals reviewed.  1920: I performed an exam of the patient and obtained history, as documented above.  The patient was sent for an abdomen pelvis CT while in the emergency department, findings above.  IV inserted and blood  drawn.    2116: Findings and plan explained to the Patient and family.    2142: Patient will be transferred to [facility] via EMS. Discussed the case with Dr. Pascal, who will admit the patient to a monitored bed for further monitoring, evaluation, and treatment.     Impression & Plan    Medical Decision Making:  Sylvester Turk is a 83 year old male who presents today for evaluation of abdominal pain.  He is seen by a provider at his memory care unit and an x-ray was obtained.  Today they received the results that were concerning for obstruction versus ileus therefore he was sent here for further evaluation.  My examination showed a nonsurgical minimally tender abdomen.  I did obtain a CT scan which shows no evidence of bowel obstruction however does have gallbladder wall thickening and apparent bile duct dilatation.  There is no obvious stones.  His laboratory studies show elevation of alk phos ALT and AST and are otherwise within normal limits for patient.  He is not febrile he has a normal white cell count.  This does not appear to be cholecystitis.  Antibiotics are not indicated at this time.  He does have a normal 7 cm aneurysm this was again shown today.  Due to the gallbladder dilatation he does need an ERCP which is not able to be obtained here over the weekend.  He also could need vascular surgery if his aneurysm becomes a problem.  Plan is therefore to transfer patient to [facility].  Again patient has been sleeping in the room without need for pain management.  He will be transferred via EMS to self the hospital.  I did speak with the hospitalist who accepted patient to his service.      Diagnosis:    ICD-10-CM    1. Abdominal pain, epigastric R10.13    2. Thickening of wall of gallbladder K82.8        Disposition:  Transferred to [facility]    Discharge Medications:  New Prescriptions    No medications on file       Fred Hernandez  8/30/2019   [facility] EMERGENCY DEPARTMENT  I,  Fred Hernandez, am serving as a scribe at 7:20 PM on 8/30/2019 to document services personally performed by Ronny Raygoza APRN based on my observations and the provider's statements to me.        Ronny Raygoza APRN CNP  08/30/19 [id number]

## 2019-08-31 NOTE — H&P
[facility]    History and Physical  Hospitalist       Date of Admission:  8/30/2019  Date of Service (when I saw the patient): 08/31/19    ASSESSMENT  Sylvester Turk is a very pleasant 83 year old gentleman with chronic Lewy Body Dementia, Parkinson's Disease, AAA, BPH, idiopathic peripheral neuropathy, nephrolithiasis, prior left hip fracture, glaucoma and hypothyroid who presents with abdominal pain and is found to have suspected acute choledocholithiasis.    PLAN    1) Suspected acute choledocholithiasis: He has trans-aminitis and dilated CBD on CT. No fever or other signs of SIRS or sepsis, making cholangitis less likely but we must monitor closely for that. He was transferred here for consideration of MRCP or ERCP.    -- Inpatient. NPO. GI consulted. Ultrasound of the RUQ ordered. 50 ml/hour NS IV fluid ordered. Tylenol, Oxycodone, IV Dilaudid as needed for pain at low doses. Anti-emetics as needed.     -- Hold off on antibiotics for now; we note a POLST form signed this year that states oral antibiotics only are to be given rather than IV. If he is found to have cholangitis he would need broad spectrum IV antibiotics; this may need to be discussed further with family in AM    2) Enlarging AAA: He has known AAA. A CT pelvis 12/2018 showed it to be 7 cm. On today's imaging it seems to have grown to 7.6 cm. It is unclear to me if it would be within his care goals to have this intervened on. Would discuss further with family in AM if possible and depending on the results of that conversation, or if GI plans procedures and would like Vascular Surgery involvement, would consult that service as needed.    3) Dementia, Parkinson's Disease, peripheral neuropathy: he seems to have significant mental illness chronically and is also reported as minimally ambulatory at baseline. He had a femur fracture from a fall surgically repaired 12/2018. Reportedly he was recently started on Nuplazid for  "hallucinations. Currently he is very sleepy after transfer here, though arousable at present.      -- Pharmacy to verify home medications, especially Sinemet dosing and times, so that he will not miss doses of this medication. He also seems to take Baclofen, Neuronin, low dose prn Ativan, Trazodone, and Seroquel. Resume when verified/as able clinically.    4) Chronic anemia: Mild, monitor while hospitalized    5) Glaucoma, Hypothyroid: Resume eye drops, Synthroid when verified    Chief Complaint   Abdominal pain    History is obtained from the patient and the ED record. Family provide history to the [facility] ED team but are not available at present for further discussions    History of Present Illness   Sylvester Turk is a pleasant 83 year old gentleman who presents with at least two days of centrally located abdominal pain noted by staff at his [facility] facility, associated with increased lower back pain. An x-ray was done at the facility yesterady and today results were concerning for possible bowel obstruction, so he was transferred to the [facility] ED. On my interview after transfer here, he wakes up when aroused and says he has sharp pain I the middle of his belly. He also endorses associated nausea though he denies vomiting. Remainder of history is limited due to what appears to be significant cognitive impairment as well as somnolence.    In the ED at [facility], T 98.1, pulse 67, /68, sats 97% on room air.     CBC showed WBC 7.4, HGB 12, . CMP was notable for Alb 3.1, T prot 6.5, Tbili 1.0, Alk phos 377, , . Electrolytes were within normal reference range with Creatinine 0.7. EKG was read as a junctional rhythm though I suspect it to be sinus bradycardia without ST segment changes.   CT abdomen and pelvis showed:   \"CONCLUSION:   1.  Gallbladder wall thickening. There is significant dilatation of the common bile duct which measures up to 2.2 cm proximally with intrahepatic bile duct " "dilatation. No pancreatic masses. ERCP is recommended in further evaluation to evaluate the   ampulla.   2.  7.6 cm infrarenal abdominal aortic aneurysm with a large amount of mural thrombus. No retroperitoneal hemorrhage.   3.  No evidence of bowel obstruction.   4.  Left hip arthroplasty.   5.  Nonobstructing renal stones the largest in the lower pole left kidney measuring 5 mm.\"    He was transferred here for further evaluation.    PHYSICAL EXAM  Blood pressure 138/88, temperature 98.1  F (36.7  C), temperature source Axillary, resp. rate 18, SpO2 97 %.  Constitutional: Somnolent but arousable; no apparent distress  HEENT: normocephalic moist mucus membranes  Respiratory: lungs clear to auscultation bilaterally  Cardiovascular: regular S1 S2  GI: abdomen soft mildly tender near the epigastrium, non distended bowel sounds positive  Lymph/Hematologic: pale, no cervical lymphadenopathy  Skin: no rash, good turgor  Musculoskeletal: mild bilateral LE edema  Neurologic: extra-ocular muscles intact; moves all four extremities; positive resting tremor     DVT Prophylaxis: Pneumatic Compression Devices  Code Status: DNR / DNI    Disposition: Expected discharge in 2-3 days    Bobby García MD    Past Medical History    I have reviewed this patient's medical history and updated it with pertinent information if needed.   Past Medical History:   Diagnosis Date     Abdominal aortic aneurysm (AAA) (H)      Anxiety      Arthritis      Basal cell carcinoma      Benign prostatic hyperplasia with lower urinary tract symptoms      BPH (benign prostatic hyperplasia)      Dementia with Lewy bodies      Femur fracture (H)      Glaucoma      Hypothyroidism      Kidney stones      Parkinson's disease (H)      Parkinsons disease (H)      Peripheral neuropathy        Past Surgical History   I have reviewed this patient's surgical history and updated it with pertinent information if needed.  Past Surgical History:   Procedure " Laterality Date     APPENDECTOMY       CATARACT IOL, RT/LT Left 05/10/2019     EXAM UNDER ANESTHESIA EYE(S) Bilateral 5/10/2019    Procedure: Exam under anesthesia eye(s);  Surgeon: Geo Rodriguez MD;  Location: [facility] OR     EYE SURGERY       OPEN REDUCTION INTERNAL FIXATION HIP BIPOLAR Left 12/15/2018    Procedure: Left hip hemiarthroplasty;  Surgeon: Ronny Tony MD;  Location: [facility] OR     PHACOEMULSIFICATION CLEAR CORNEA WITH STANDARD INTRAOCULAR LENS IMPLANT Left 5/10/2019    Procedure: Left Eye Phacoemulcification Clear Cornea with Standard Lens Implant;  Surgeon: Geo Rodriguez MD;  Location: [facility] OR       Prior to Admission Medications   Prior to Admission Medications   Prescriptions Last Dose Informant Patient Reported? Taking?   Camphor-Eucalyptus-Menthol (VAPORIZING CHEST RUB EX)   Yes No   Carboxymethylcellulose Sod PF (REFRESH PLUS) 0.5 % SOLN ophthalmic solution   Yes No   Sig: Place 1 drop into both eyes At Bedtime   LORazepam (ATIVAN) 0.5 MG tablet   Yes No   Sig: Take 0.25 mg by mouth 2 times daily as needed for anxiety   QUEtiapine (SEROQUEL) 50 MG tablet   Yes No   Sig: Take 50 mg by mouth every morning   acetaminophen (TYLENOL) 325 MG tablet   No No   Sig: Take 3 tablets (975 mg) by mouth every 8 hours for 7 days   aspirin (ASA) 81 MG EC tablet   No No   Sig: Take 2 tablets (162 mg) by mouth 2 times daily (with meals)   baclofen (LIORESAL) 10 MG tablet   Yes No   Sig: Take 10 mg by mouth 2 times daily   bisacodyl ([employer] BISACODYL) 10 MG suppository   Yes No   Sig: Place 10 mg rectally daily as needed for constipation   brimonidine (ALPHAGAN) 0.2 % ophthalmic solution   Yes No   Sig: Place 1 drop into the right eye 2 times daily   camphor-menthol (SARNA) 0.5-0.5 % external lotion   Yes No   Sig: Apply topically At Bedtime Apply to feet and legs at bedtime   carbidopa-levodopa (SINEMET CR)  MG CR tablet   Yes No   Sig: Take 1 tablet by mouth 3 times daily [id number]    dorzolamide-timolol (COSOPT) 2-0.5 % ophthalmic solution   Yes No   Sig: Place 1 drop into the right eye 2 times daily   finasteride (PROSCAR) 5 MG tablet   Yes No   Sig: Take 5 mg by mouth daily   gabapentin (NEURONTIN) 300 MG capsule   Yes No   Sig: Take 300 mg by mouth 3 times daily   ketorolac tromethamine (ACULAR-LS) 0.4 % SOLN ophthalmic solution   No No   Sig: Apply 1 drop to eye 4 times daily Instill into operative eye(s) per physician instructions.   latanoprost (XALATAN) 0.005 % ophthalmic solution   Yes No   Sig: Place 1 drop into the right eye At Bedtime   levothyroxine (SYNTHROID/LEVOTHROID) 50 MCG tablet   Yes No   Sig: Take 50 mcg by mouth daily   magnesium hydroxide (MILK OF MAGNESIA) 400 MG/5ML suspension   Yes No   Sig: Take 15 mLs by mouth daily as needed for constipation or heartburn   ofloxacin (OCUFLOX) 0.3 % ophthalmic solution   No No   Sig: Apply 1 drop to eye 4 times daily Instill into operative eye(s) per physician instructions.   prednisoLONE acetate (PRED FORTE) 1 % ophthalmic suspension   No No   Sig: Apply 1 drop to eye 4 times daily Instill into operative eye(s) per physician instructions.   senna-docusate (SENOKOT-S/PERICOLACE) 8.6-50 MG tablet   No No   Sig: Take 2 tablets by mouth 2 times daily for 8 days   Patient taking differently: Take 2 tablets by mouth daily as needed    tamsulosin (FLOMAX) 0.4 MG capsule   Yes No   Sig: Take 0.8 mg by mouth At Bedtime   traMADol (ULTRAM) 50 MG tablet   No No   Sig: Take 0.5 tablets (25 mg) by mouth every 6 hours as needed for moderate to severe pain or severe pain   traZODone (DESYREL) 50 MG tablet   Yes No   Sig: Take 50 mg by mouth At Bedtime      Facility-Administered Medications: None     Allergies   No Known Allergies    Social History   I have reviewed this patient's social history and updated it with pertinent information if needed. Sylvester Turk  reports that he has never smoked. He has never used smokeless tobacco. He reports  that he drank alcohol.    Family History   Family history assessed and, except as above, is non-contributory.    Family History   Problem Relation Age of Onset     Glaucoma No family hx of      Macular Degeneration No family hx of        Review of Systems   The 10 point Review of Systems is negative other than noted in the HPI or here.     Primary Care Physician   Adolfo Tony    Data   Labs Ordered and Resulted from Time of ED Arrival Up to the Time of Departure from the ED   COMPREHENSIVE METABOLIC PANEL   CBC WITH PLATELETS DIFFERENTIAL   INR   VITAL SIGNS   INTAKE AND OUTPUT   DAILY WEIGHTS   PERIPHERAL IV CATHETER   APPLY PNEUMATIC COMPRESSION DEVICE (PCD)   PAIN ASSESSMENT       Data reviewed today:  I personally reviewed the EKG tracing showing regular narrow complex rhythm with upright P waves in 1 and 2 and downward P wave in AVL.    Recent Results (from the past 24 hour(s))   Abd/pelvis CT,  IV  contrast only TRAUMA / AAA    Narrative    EXAM: CT ABDOMEN PELVIS W CONTRAST  LOCATION: [facility]  DATE/TIME: 8/30/2019 8:12 PM    INDICATION: Known abdominal aortic aneurysm. Abdominal pain.  COMPARISON: None.  TECHNIQUE: Helical enhanced thin-section CT scan of the abdomen and pelvis was performed following injection of IV contrast. Multiplanar reformats were obtained. Dose reduction techniques were used.  CONTRAST: 91 mL Isovue-[id number].    FINDINGS:   LUNG BASES: Coronary artery calcification. The heart is mildly enlarged.    ABDOMEN: 7.6 x 6.0 cm abdominal aortic aneurysm with a large amount of mural thrombus. No retroperitoneal fluid. 5 mm nonobstructing stone lower pole left kidney. A few cysts within the kidneys with a few additional tiny nonobstructing stones in the   kidneys. Gallbladder wall thickening. There is significant dilatation of the common bile duct which measures 2.2 cm proximally with significant intrahepatic bile duct dilatation. Negative pancreas. ERCP is recommended in further  evaluation to evaluate   the ampulla. There is no evidence for bowel obstruction or distention. Spleen, adrenal glands are negative.    PELVIS: Aneurysmal dilatation right common iliac artery measuring 2.6 cm. Streak artifact from left hip arthroplasty.    MUSCULOSKELETAL: Lumbar degenerative change.      Impression    CONCLUSION:   1.  Gallbladder wall thickening. There is significant dilatation of the common bile duct which measures up to 2.2 cm proximally with intrahepatic bile duct dilatation. No pancreatic masses. ERCP is recommended in further evaluation to evaluate the   ampulla.    2.  7.6 cm infrarenal abdominal aortic aneurysm with a large amount of mural thrombus. No retroperitoneal hemorrhage.    3.  No evidence of bowel obstruction.    4.  Left hip arthroplasty.    5.  Nonobstructing renal stones the largest in the lower pole left kidney measuring 5 mm.

## 2019-08-31 NOTE — PHARMACY-ADMISSION MEDICATION HISTORY
Admission medication history interview status for the 8/30/2019  admission is complete. See EPIC admission navigator for prior to admission medications     Medication history source reliability:Good    Actions taken by pharmacist (provider contacted, etc): Utilized MAR from [facility], called to verify Sinemet timing.     Additional medication history information not noted on PTA med list :  - Removed baclofen, ativan, Sarna lotion, and refresh plus which were ordered on admission.    Medication reconciliation/reorder completed by provider prior to medication history? Yes    Time spent in this activity: 25 minutes    Prior to Admission medications    Medication Sig Last Dose Taking? Auth Provider   acetaminophen (TYLENOL) 325 MG tablet Take 975 mg by mouth every 8 hours as needed for mild pain prn Yes Unknown, Entered By History   aspirin 81 MG EC tablet Take 81 mg by mouth daily 8/30/2019 at 0800 Yes Unknown, Entered By History   bisacodyl ([employer] BISACODYL) 10 MG suppository Place 10 mg rectally daily as needed for constipation prn Yes Reported, Patient   bismuth subsalicylate (PEPTO BISMOL) 262 MG chewable tablet Take 2 tablets by mouth every hour as needed (Do not give more than 8 tabs in 24 hours) prn Yes Unknown, Entered By History   brimonidine (ALPHAGAN) 0.2 % ophthalmic solution Place 1 drop into the right eye 2 times daily 8/30/2019 at 0900 Yes Reported, Patient   Camphor-Eucalyptus-Menthol (VAPORIZING CHEST RUB EX) Externally apply topically as needed  prn Yes Reported, Patient   carbidopa-levodopa (SINEMET CR)  MG CR tablet Take 1 tablet by mouth 3 times daily [id number] 8/30/2019 at x2 doses Yes Unknown, Entered By History   Cholecalciferol (VITAMIN D3) 5000 units TBDP Take 1 packet by mouth daily 8/30/2019 at 0900 Yes Unknown, Entered By History   dorzolamide-timolol (COSOPT) 2-0.5 % ophthalmic solution Place 1 drop into the right eye 2 times daily 8/30/2019 at am Yes Reported,  Patient   finasteride (PROSCAR) 5 MG tablet Take 5 mg by mouth daily 8/29/2019 at pm Yes Reported, Patient   gabapentin (NEURONTIN) 300 MG capsule Take 300 mg by mouth At Bedtime  8/29/2019 at hs Yes Reported, Patient   ketorolac tromethamine (ACULAR-LS) 0.4 % SOLN ophthalmic solution Apply 1 drop to eye 4 times daily Instill into operative eye(s) per physician instructions.  Patient taking differently: Place 1 drop Into the left eye daily as needed Instill into operative eye(s) per physician instructions. prn Yes Gaby Card MD   latanoprost (XALATAN) 0.005 % ophthalmic solution Place 1 drop into the right eye At Bedtime 8/29/2019 at hs Yes Unknown, Entered By History   levothyroxine (SYNTHROID/LEVOTHROID) 50 MCG tablet Take 50 mcg by mouth daily 8/30/2019 at 0700 Yes Reported, Patient   loratadine (CLARITIN) 10 MG tablet Take 10 mg by mouth daily 8/30/2019 at am Yes Unknown, Entered By History   magnesium hydroxide (MILK OF MAGNESIA) 400 MG/5ML suspension Take 15 mLs by mouth daily as needed for constipation or heartburn prn Yes Reported, Patient   menthol-zinc oxide (CALMOSEPTINE) 0.44-20.6 % OINT ointment Apply topically 3 times daily To affected area of bilateral buttock redness 8/30/2019 at midday Yes Unknown, Entered By History   pimavanserin (NUPLAZID) 17 MG TABS tablet Take 34 mg by mouth daily 8/30/2019 at 0800 Yes Unknown, Entered By History   QUEtiapine (SEROQUEL) 25 MG tablet Take 12.5 mg by mouth 3 times daily as needed  prn Yes Unknown, Entered By History   senna-docusate (SENOKOT-S/PERICOLACE) 8.6-50 MG tablet Take 1 tablet by mouth 2 times daily as needed for constipation prn Yes Unknown, Entered By History   tamsulosin (FLOMAX) 0.4 MG capsule Take 0.8 mg by mouth At Bedtime 8/29/2019 at hs Yes Unknown, Entered By History   traMADol (ULTRAM) 50 MG tablet Take 25 mg by mouth every 4 hours as needed for severe pain prn Yes Unknown, Entered By History   traZODone (DESYREL) 50 MG tablet Take  50 mg by mouth At Bedtime 8/29/2019 at  Yes Reported, Patient

## 2019-08-31 NOTE — CONSULTS
[facility] - GASTROENTEROLOGY CONSULTATION      Sylvester Turk  [address]   [facility] [id number]  83 year old male     Admission Date/Time: 8/30/2019  Primary Care Provider: Adolfo Tony     We were asked to see the patient in consultation by Dr. García for evaluation of increased LFTs/dilated CBD.    ASSESSMENT:    1. Increased LFTs and dilated CBD - suspicious for choledocholithiasis, no signs of cholangitis. Cholelithiasis on ultrasound. Ultimately, may need general anesthesia for EUS/ERCP, uncertain risk for GA with the large AAA. Will assess duct further with MRCP and await the vascular consult.  -- Patient's family reports that if CBD stone, then they would be ok with ERCP with GA.    RECOMMENDATIONS:  1. MRCP  2. Ok for clear diet after MRCP, NPO at MN in case procedures needed tomorrow  3. Vascular surgery consult    Keven Shultz MD   [facility]  Cell [phone]  After 5 PM, please call [phone]  ________________________________________________________________________        HPI:  Sylvester Turk is a 83 year old male who has history of chronic Lewy Body Dementia and Parkinsons, history limited due to these conditions. He was having upper abdominal pain for 2 days. AXR done at the home and possible SBO. CT at [facility] and it showed CBD dilation. Also, LFTs increased. No fevers. No elevated WBC. No nausea or vomiting. He reports pain is resolved currently    Large AAA that has increased in size over the last year.      ROS: A comprehensive ten point review of systems was negative aside from those in mentioned in the HPI.      PAST MEDICAL HISTORY:  Past Medical History:   Diagnosis Date     Abdominal aortic aneurysm (AAA) (H)      Anxiety      Arthritis      Basal cell carcinoma      Benign prostatic hyperplasia with lower urinary tract symptoms      BPH (benign prostatic hyperplasia)      Dementia with Lewy bodies      Femur fracture (H)      Glaucoma      Hypothyroidism       Kidney stones      Parkinson's disease (H)      Parkinsons disease (H)      Peripheral neuropathy      SOCIAL HISTORY:  Social History     Tobacco Use     Smoking status: Never Smoker     Smokeless tobacco: Never Used   Substance Use Topics     Alcohol use: Not Currently     Drug use: Not on file     FAMILY HISTORY:  Family History   Problem Relation Age of Onset     Glaucoma No family hx of      Macular Degeneration No family hx of      ALLERGIES: No Known Allergies  MEDICATIONS:   Prior to Admission medications    Medication Sig Start Date End Date Taking? Authorizing Provider   acetaminophen (TYLENOL) 325 MG tablet Take 3 tablets (975 mg) by mouth every 8 hours for 7 days 12/17/18 5/11/19  Bronwyn Shirley PA-C   aspirin (ASA) 81 MG EC tablet Take 2 tablets (162 mg) by mouth 2 times daily (with meals) 12/17/18 5/11/19  Bronwyn Shirley PA-C   baclofen (LIORESAL) 10 MG tablet Take 10 mg by mouth 2 times daily    Reported, Patient   bisacodyl ([facility] BISACODYL) 10 MG suppository Place 10 mg rectally daily as needed for constipation    Reported, Patient   brimonidine (ALPHAGAN) 0.2 % ophthalmic solution Place 1 drop into the right eye 2 times daily    Reported, Patient   Camphor-Eucalyptus-Menthol (VAPORIZING CHEST RUB EX)     Reported, Patient   camphor-menthol (SARNA) 0.5-0.5 % external lotion Apply topically At Bedtime Apply to feet and legs at bedtime    Unknown, Entered By History   carbidopa-levodopa (SINEMET CR)  MG CR tablet Take 1 tablet by mouth 3 times daily [id number]    Unknown, Entered By History   Carboxymethylcellulose Sod PF (REFRESH PLUS) 0.5 % SOLN ophthalmic solution Place 1 drop into both eyes At Bedtime    Unknown, Entered By History   dorzolamide-timolol (COSOPT) 2-0.5 % ophthalmic solution Place 1 drop into the right eye 2 times daily    Reported, Patient   finasteride (PROSCAR) 5 MG tablet Take 5 mg by mouth daily    Reported, Patient   gabapentin (NEURONTIN) 300 MG capsule  Take 300 mg by mouth 3 times daily    Reported, Patient   ketorolac tromethamine (ACULAR-LS) 0.4 % SOLN ophthalmic solution Apply 1 drop to eye 4 times daily Instill into operative eye(s) per physician instructions. 5/10/19   Gaby Card MD   latanoprost (XALATAN) 0.005 % ophthalmic solution Place 1 drop into the right eye At Bedtime    Unknown, Entered By History   levothyroxine (SYNTHROID/LEVOTHROID) 50 MCG tablet Take 50 mcg by mouth daily    Reported, Patient   LORazepam (ATIVAN) 0.5 MG tablet Take 0.25 mg by mouth 2 times daily as needed for anxiety    Reported, Patient   magnesium hydroxide (MILK OF MAGNESIA) 400 MG/5ML suspension Take 15 mLs by mouth daily as needed for constipation or heartburn    Reported, Patient   ofloxacin (OCUFLOX) 0.3 % ophthalmic solution Apply 1 drop to eye 4 times daily Instill into operative eye(s) per physician instructions. 5/10/19   Gaby Card MD   prednisoLONE acetate (PRED FORTE) 1 % ophthalmic suspension Apply 1 drop to eye 4 times daily Instill into operative eye(s) per physician instructions. 5/10/19   Gaby Crad MD   QUEtiapine (SEROQUEL) 50 MG tablet Take 50 mg by mouth every morning    Unknown, Entered By History   senna-docusate (SENOKOT-S/PERICOLACE) 8.6-50 MG tablet Take 2 tablets by mouth 2 times daily for 8 days  Patient taking differently: Take 2 tablets by mouth daily as needed  12/17/18 5/11/19  Bronwyn Shirley PA-C   tamsulosin (FLOMAX) 0.4 MG capsule Take 0.8 mg by mouth At Bedtime    Unknown, Entered By History   traMADol (ULTRAM) 50 MG tablet Take 0.5 tablets (25 mg) by mouth every 6 hours as needed for moderate to severe pain or severe pain 12/18/18 5/11/19  Aubree Osborn MD   traZODone (DESYREL) 50 MG tablet Take 50 mg by mouth At Bedtime    Reported, Patient     PHYSICAL EXAM:   BP (!) 160/109   Pulse 80   Temp 98  F (36.7  C) (Axillary)   Resp 16   Wt 76.2 kg (167 lb 15.9 oz)   SpO2 97%   BMI 21.57 kg/m      GEN: Sleepy, NAD.  ÁNGEL: AT, anicteric, OP without erythema, exudate, or ulcers.    NECK: Supple.    LYMPH: No LAD noted.  HRT: RRR, no SINGH  LUNGS: CTA  ABD: +BS, non-tender, non-distended, no rebound or guarding.  SKIN: No rash, jaundice   NEURO: MS decreased, ? At baseline      ADDITIONAL DATA:   I reviewed the patient's new clinical lab test results.   Recent Labs   Lab Test 08/31/19 0748 08/30/19 1928 12/18/18 0713   WBC 8.3 7.4 8.1   HGB 11.9* 11.7* 9.0*   MCV 81 84 78    201 176   INR 1.07  --   --      Recent Labs   Lab Test 08/31/19 0748 08/30/19 1928 12/18/18 0713   POTASSIUM 3.6 3.5 4.2   CHLORIDE 105 103 104   CO2 28 31 23   BUN 14 18 31*   ANIONGAP 6 5 8     Recent Labs   Lab Test 08/31/19 0748 08/30/19 1928   ALBUMIN 3.0* 3.1*   BILITOTAL 0.7 1.0   * 142*   * 145*        I reviewed the patient's new imaging results.     EXAM: CT ABDOMEN PELVIS W CONTRAST  LOCATION: [facility]  DATE/TIME: 8/30/2019 8:12 PM     INDICATION: Known abdominal aortic aneurysm. Abdominal pain.  COMPARISON: None.  TECHNIQUE: Helical enhanced thin-section CT scan of the abdomen and pelvis was performed following injection of IV contrast. Multiplanar reformats were obtained. Dose reduction techniques were used.  CONTRAST: 91 mL Isovue-370.     FINDINGS:   LUNG BASES: Coronary artery calcification. The heart is mildly enlarged.     ABDOMEN: 7.6 x 6.0 cm abdominal aortic aneurysm with a large amount of mural thrombus. No retroperitoneal fluid. 5 mm nonobstructing stone lower pole left kidney. A few cysts within the kidneys with a few additional tiny nonobstructing stones in the   kidneys. Gallbladder wall thickening. There is significant dilatation of the common bile duct which measures 2.2 cm proximally with significant intrahepatic bile duct dilatation. Negative pancreas. ERCP is recommended in further evaluation to evaluate   the ampulla. There is no evidence for bowel obstruction or  distention. Spleen, adrenal glands are negative.     PELVIS: Aneurysmal dilatation right common iliac artery measuring 2.6 cm. Streak artifact from left hip arthroplasty.     MUSCULOSKELETAL: Lumbar degenerative change.                                                                      CONCLUSION:   1.  Gallbladder wall thickening. There is significant dilatation of the common bile duct which measures up to 2.2 cm proximally with intrahepatic bile duct dilatation. No pancreatic masses. ERCP is recommended in further evaluation to evaluate the   ampulla.     2.  7.6 cm infrarenal abdominal aortic aneurysm with a large amount of mural thrombus. No retroperitoneal hemorrhage.     3.  No evidence of bowel obstruction.     4.  Left hip arthroplasty.     5.  Nonobstructing renal stones the largest in the lower pole left kidney measuring 5 mm.    ULTRASOUND ABDOMEN LIMITED   8/31/2019 8:39 AM      HISTORY:  Abdominal pain, choledocholithiasis, abdominal pain,  choledocholithiasis.     COMPARISON: CT abdomen and pelvis on 8/30/2019.     FINDINGS:  The exam is mildly limited due to overlying bowel gas.     Gallbladder: Multiple gallstones. Mildly thickened gallbladder wall  measures 0.4 cm. No pericholecystic fluid. Sonographic Cain's sign  is negative.     Bile ducts:  The common bile duct is not clearly visualized.     Liver: Mild intrahepatic biliary dilatation.     Pancreas:  Not visualized due to overlying bowel gas.     Right kidney:  Normal.      Aorta and IVC:  Not specifically assessed.                                                                        IMPRESSION:  Exam is mildly limited due to overlying bowel gas.  1. Cholelithiasis with mild gallbladder wall thickening. No  pericholecystic fluid, and sonographic Cain's sign is negative.  Findings less likely to represent acute cholecystitis.  2. The common bile duct is not clearly visualized due to overlying  bowel gas.  3. Mild intrahepatic biliary  dilatation, better seen on 8/30/2019 CT.

## 2019-08-31 NOTE — PROVIDER NOTIFICATION
12:56 PM  Updated Dr. Shultz that the patient was unable to have MRCP due to combativeness and pulling at lines. Plan to await for vascular consult to determine risks for general anesthesia.

## 2019-08-31 NOTE — CONSULTS
Attempted to see patient although he is off the floor for MRCP  Did review chart, labs and imaging  Has evidence of cholecystitis in the setting of, bile duct dilation suggestive for choledocholithiasis  Await results of MRCP before making additional plans.  Consideration of EUS/ERCP versus cholecystectomy to follow  Agree with plan at this time that patient could be on clear liquids with n.p.o. after midnight depending upon the above  We will see patient and perform more complete consultation tomorrow

## 2019-08-31 NOTE — CONSULTS
Chief Complaint:  Abdominal Pain     HPI: This patient is an 83 year old male with a history of dementia, a known AAA, and parkinson's who presents with abdominal pain. Of note he has a known approximately 7.1cm AAA from 2018 when he has his hip surgery.  There is concern now for possible CBD obstruction.  He was unable to follow commands for MRCP today.  He was very anxious as well.  The vascular team has been asked to discuss his greater than 7 cm AAA and risk of general anesthesia should a ERCP or lap preethi be advised.    ROS: patient has dementia and was unable to provide very specific information for an ROS.     Allergies:No known drug allergies     Home Medications:    Nuplazid  Lioresal  Sinemet  Proscar  Neurontin  Synthroid  Ativan  Seroquel  Desyrel     Past Medical History:    Abdominal aortic aneurysm (known to be greater than 7 cm in 2018)  Anxiety  Arthritis  Basal cell carcinoma  Benign prostatic hyperplasia  Dementia  Femur fracture  Glaucoma  Hypothyroidism  Kidney Stone  Parkinson's disease     Past Surgical History:    Appendectomy  Cataract IOL  Eye surgery  Open reduction internal fixation (L)  Phacoemulsification clear cornea with standard intraocular lens implant (L)     Family History:    No known FH of bleeding disorders     Social History:  Smoking status: Never  PCP: Adolfo Tony  Presents to the ED with family  Marital Status:   [2]     Physical Exam:  Afebrile  HR 80s  SBP in 160s  Gen: NAD  Heart: reg rate  Lungs: non-labored breathing on supplemental O2, good bilateral air movement  Abd: soft, not rigid, moderate TTP of the epigastric region, not peritoneal  Extremities: spontaneously moving all 4 extremities  Neuro: alert to name and that he is in the hospital     Labs:  Cr 0.8  WBC 8.3    Imaging:  He has a 7.6 by 5.0 cm AAA that is infra-renal.  There is a large amount of mural thrombus.  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     Assessment: This is an 83 year old  male with some element of dementia who presents with epigastric abdominal pain and there is concern for possible biliary tract obstruction, being worked up by GI and General Surgery, the teams are requesting vascular surgery input on the patient's known large 7.6cm AAA and risk of general anesthesia    Plan:  -The patient would be at increased risk for general anesthesia, with AAA rupture being a possible concern, though it would not be prohibitive as long as the family understood the increased risk  -The biliary tract issues should be worked out first before addressing the large AAA of greater than 7 cm, if this can be done without general anesthesia for ERCP or without lap preethi than that would represent a less risk option, IV abx are being utilized now, but if those procedures are needed then general anesthesia could be considered as part of a higher risk necessary treatment option  -After the biliary tract issues are worked out, we can discuss with the patient possible intervention.  He most likely has anatomy favorable for endovascular approach though the R iliac artery aneurysm makes it a more complicated endovascular case  -We would want to have a further discussion with the family and the patient about if the patient truly wants to have this repaired, however, as he did know about this since 2018 with no intervention and the increase in size from 2018 to now is not more than about 1/2 a cm  -The vascular team will continue to be available for questions and concerns and to discuss future repair with the family and the patient    Case discussed with the Attending Surgeon, Dr. Mcginnis, who directed the above plan of care.    Florencio Rosario  Vascular Fellow    STAFF: Discussed with Dr Rosario and reviewed the images.  Slight increase in size of AAA.  May be amenable to an endovascular repair if the patient continues decides to proceed.  Only a slight increase in size over the past year and thus I do not feel this  has a high risk for imminent rupture.  Patient should proceed with treating his presenting problems related to his gallbladder issue this admission.  AAA could be dealt with once he is recovered if he decides to proceed.      Deonte Mcginnis MD

## 2019-08-31 NOTE — PROGRESS NOTES
Visited with patient today morning, he was admitted early morning/overnight.  He is comfortable, resting, denies any ongoing abdominal pain.  He was seen by gastroenterology, plan to get an MRCP.  I have added antibiotics due to gallbladder wall thickening, patient and family is willing to try IV antibiotics, will try to restart few of his home medications.  He is on clear liquid diet until midnight tonight.  Requested surgery input as well.  Discussed plan with family.

## 2019-09-01 ENCOUNTER — APPOINTMENT (OUTPATIENT)
Dept: GENERAL RADIOLOGY | Facility: CLINIC | Age: 84
DRG: 417 | End: 2019-09-01
Attending: INTERNAL MEDICINE
Payer: MEDICARE

## 2019-09-01 ENCOUNTER — ANESTHESIA EVENT (OUTPATIENT)
Dept: SURGERY | Facility: CLINIC | Age: 84
DRG: 417 | End: 2019-09-01
Payer: MEDICARE

## 2019-09-01 ENCOUNTER — ANESTHESIA (OUTPATIENT)
Dept: SURGERY | Facility: CLINIC | Age: 84
DRG: 417 | End: 2019-09-01
Payer: MEDICARE

## 2019-09-01 LAB
ALBUMIN SERPL-MCNC: 3.1 G/DL (ref 3.4–5)
ALP SERPL-CCNC: 323 U/L (ref 40–150)
ALT SERPL W P-5'-P-CCNC: 126 U/L (ref 0–70)
ANION GAP SERPL CALCULATED.3IONS-SCNC: 4 MMOL/L (ref 3–14)
AST SERPL W P-5'-P-CCNC: 67 U/L (ref 0–45)
BILIRUB SERPL-MCNC: 1.3 MG/DL (ref 0.2–1.3)
BUN SERPL-MCNC: 13 MG/DL (ref 7–30)
CALCIUM SERPL-MCNC: 8.8 MG/DL (ref 8.5–10.1)
CHLORIDE SERPL-SCNC: 104 MMOL/L (ref 94–109)
CO2 SERPL-SCNC: 29 MMOL/L (ref 20–32)
CREAT SERPL-MCNC: 0.65 MG/DL (ref 0.66–1.25)
ERCP: NORMAL
ERYTHROCYTE [DISTWIDTH] IN BLOOD BY AUTOMATED COUNT: 14.6 % (ref 10–15)
GFR SERPL CREATININE-BSD FRML MDRD: >90 ML/MIN/{1.73_M2}
GLUCOSE SERPL-MCNC: 74 MG/DL (ref 70–99)
HCT VFR BLD AUTO: 39 % (ref 40–53)
HGB BLD-MCNC: 12.7 G/DL (ref 13.3–17.7)
INR PPP: 1.07 (ref 0.86–1.14)
MCH RBC QN AUTO: 26.1 PG (ref 26.5–33)
MCHC RBC AUTO-ENTMCNC: 32.6 G/DL (ref 31.5–36.5)
MCV RBC AUTO: 80 FL (ref 78–100)
PLATELET # BLD AUTO: 202 10E9/L (ref 150–450)
POTASSIUM SERPL-SCNC: 3.5 MMOL/L (ref 3.4–5.3)
PROT SERPL-MCNC: 6.7 G/DL (ref 6.8–8.8)
RBC # BLD AUTO: 4.86 10E12/L (ref 4.4–5.9)
SODIUM SERPL-SCNC: 137 MMOL/L (ref 133–144)
WBC # BLD AUTO: 8.3 10E9/L (ref 4–11)

## 2019-09-01 PROCEDURE — 74330 X-RAY BILE/PANC ENDOSCOPY: CPT

## 2019-09-01 PROCEDURE — 36000056 ZZH SURGERY LEVEL 3 1ST 30 MIN: Performed by: INTERNAL MEDICINE

## 2019-09-01 PROCEDURE — C1769 GUIDE WIRE: HCPCS | Performed by: INTERNAL MEDICINE

## 2019-09-01 PROCEDURE — 25000128 H RX IP 250 OP 636: Performed by: ANESTHESIOLOGY

## 2019-09-01 PROCEDURE — 71000012 ZZH RECOVERY PHASE 1 LEVEL 1 FIRST HR: Performed by: INTERNAL MEDICINE

## 2019-09-01 PROCEDURE — 36000058 ZZH SURGERY LEVEL 3 EA 15 ADDTL MIN: Performed by: INTERNAL MEDICINE

## 2019-09-01 PROCEDURE — 25800030 ZZH RX IP 258 OP 636: Performed by: NURSE ANESTHETIST, CERTIFIED REGISTERED

## 2019-09-01 PROCEDURE — 99232 SBSQ HOSP IP/OBS MODERATE 35: CPT | Performed by: SURGERY

## 2019-09-01 PROCEDURE — 85027 COMPLETE CBC AUTOMATED: CPT | Performed by: INTERNAL MEDICINE

## 2019-09-01 PROCEDURE — 99207 ZZC MOONLIGHTING INDICATOR: CPT | Performed by: PHYSICIAN ASSISTANT

## 2019-09-01 PROCEDURE — 71000013 ZZH RECOVERY PHASE 1 LEVEL 1 EA ADDTL HR: Performed by: INTERNAL MEDICINE

## 2019-09-01 PROCEDURE — 0F798ZZ DILATION OF COMMON BILE DUCT, VIA NATURAL OR ARTIFICIAL OPENING ENDOSCOPIC: ICD-10-PCS | Performed by: INTERNAL MEDICINE

## 2019-09-01 PROCEDURE — 99231 SBSQ HOSP IP/OBS SF/LOW 25: CPT | Performed by: PHYSICIAN ASSISTANT

## 2019-09-01 PROCEDURE — 12000000 ZZH R&B MED SURG/OB

## 2019-09-01 PROCEDURE — 25000132 ZZH RX MED GY IP 250 OP 250 PS 637: Mod: GY | Performed by: INTERNAL MEDICINE

## 2019-09-01 PROCEDURE — 25800030 ZZH RX IP 258 OP 636: Performed by: ANESTHESIOLOGY

## 2019-09-01 PROCEDURE — 25000125 ZZHC RX 250: Performed by: NURSE ANESTHETIST, CERTIFIED REGISTERED

## 2019-09-01 PROCEDURE — 25000128 H RX IP 250 OP 636: Performed by: INTERNAL MEDICINE

## 2019-09-01 PROCEDURE — 37000009 ZZH ANESTHESIA TECHNICAL FEE, EACH ADDTL 15 MIN: Performed by: INTERNAL MEDICINE

## 2019-09-01 PROCEDURE — 0F758ZZ DILATION OF RIGHT HEPATIC DUCT, VIA NATURAL OR ARTIFICIAL OPENING ENDOSCOPIC: ICD-10-PCS | Performed by: INTERNAL MEDICINE

## 2019-09-01 PROCEDURE — 25000128 H RX IP 250 OP 636: Performed by: NURSE ANESTHETIST, CERTIFIED REGISTERED

## 2019-09-01 PROCEDURE — 25800030 ZZH RX IP 258 OP 636: Performed by: HOSPITALIST

## 2019-09-01 PROCEDURE — 25000132 ZZH RX MED GY IP 250 OP 250 PS 637: Mod: GY | Performed by: HOSPITALIST

## 2019-09-01 PROCEDURE — 25000566 ZZH SEVOFLURANE, EA 15 MIN: Performed by: INTERNAL MEDICINE

## 2019-09-01 PROCEDURE — 36415 COLL VENOUS BLD VENIPUNCTURE: CPT | Performed by: INTERNAL MEDICINE

## 2019-09-01 PROCEDURE — 80053 COMPREHEN METABOLIC PANEL: CPT | Performed by: INTERNAL MEDICINE

## 2019-09-01 PROCEDURE — 25000128 H RX IP 250 OP 636: Performed by: HOSPITALIST

## 2019-09-01 PROCEDURE — 25500064 ZZH RX 255 OP 636: Performed by: INTERNAL MEDICINE

## 2019-09-01 PROCEDURE — 27210794 ZZH OR GENERAL SUPPLY STERILE: Performed by: INTERNAL MEDICINE

## 2019-09-01 PROCEDURE — 40000171 ZZH STATISTIC PRE-PROCEDURE ASSESSMENT III: Performed by: INTERNAL MEDICINE

## 2019-09-01 PROCEDURE — 85610 PROTHROMBIN TIME: CPT | Performed by: INTERNAL MEDICINE

## 2019-09-01 PROCEDURE — C1887 CATHETER, GUIDING: HCPCS | Performed by: INTERNAL MEDICINE

## 2019-09-01 PROCEDURE — 37000008 ZZH ANESTHESIA TECHNICAL FEE, 1ST 30 MIN: Performed by: INTERNAL MEDICINE

## 2019-09-01 PROCEDURE — 27210286 ZZH BALLOON ADDITIONAL: Performed by: INTERNAL MEDICINE

## 2019-09-01 RX ORDER — LIDOCAINE HYDROCHLORIDE 20 MG/ML
INJECTION, SOLUTION INFILTRATION; PERINEURAL PRN
Status: DISCONTINUED | OUTPATIENT
Start: 2019-09-01 | End: 2019-09-01

## 2019-09-01 RX ORDER — NALOXONE HYDROCHLORIDE 0.4 MG/ML
.1-.4 INJECTION, SOLUTION INTRAMUSCULAR; INTRAVENOUS; SUBCUTANEOUS
Status: DISCONTINUED | OUTPATIENT
Start: 2019-09-01 | End: 2019-09-02

## 2019-09-01 RX ORDER — HYDRALAZINE HYDROCHLORIDE 20 MG/ML
10 INJECTION INTRAMUSCULAR; INTRAVENOUS ONCE
Status: COMPLETED | OUTPATIENT
Start: 2019-09-01 | End: 2019-09-01

## 2019-09-01 RX ORDER — DEXAMETHASONE SODIUM PHOSPHATE 4 MG/ML
INJECTION, SOLUTION INTRA-ARTICULAR; INTRALESIONAL; INTRAMUSCULAR; INTRAVENOUS; SOFT TISSUE PRN
Status: DISCONTINUED | OUTPATIENT
Start: 2019-09-01 | End: 2019-09-01

## 2019-09-01 RX ORDER — HYDRALAZINE HYDROCHLORIDE 20 MG/ML
2.5-5 INJECTION INTRAMUSCULAR; INTRAVENOUS EVERY 10 MIN PRN
Status: DISCONTINUED | OUTPATIENT
Start: 2019-09-01 | End: 2019-09-01 | Stop reason: HOSPADM

## 2019-09-01 RX ORDER — PROPOFOL 10 MG/ML
INJECTION, EMULSION INTRAVENOUS PRN
Status: DISCONTINUED | OUTPATIENT
Start: 2019-09-01 | End: 2019-09-01

## 2019-09-01 RX ORDER — ONDANSETRON 4 MG/1
4 TABLET, ORALLY DISINTEGRATING ORAL EVERY 30 MIN PRN
Status: DISCONTINUED | OUTPATIENT
Start: 2019-09-01 | End: 2019-09-01 | Stop reason: HOSPADM

## 2019-09-01 RX ORDER — SODIUM CHLORIDE, SODIUM LACTATE, POTASSIUM CHLORIDE, CALCIUM CHLORIDE 600; 310; 30; 20 MG/100ML; MG/100ML; MG/100ML; MG/100ML
INJECTION, SOLUTION INTRAVENOUS CONTINUOUS
Status: DISCONTINUED | OUTPATIENT
Start: 2019-09-01 | End: 2019-09-01 | Stop reason: HOSPADM

## 2019-09-01 RX ORDER — FENTANYL CITRATE 50 UG/ML
25-50 INJECTION, SOLUTION INTRAMUSCULAR; INTRAVENOUS
Status: DISCONTINUED | OUTPATIENT
Start: 2019-09-01 | End: 2019-09-01 | Stop reason: HOSPADM

## 2019-09-01 RX ORDER — EPHEDRINE SULFATE 50 MG/ML
INJECTION, SOLUTION INTRAMUSCULAR; INTRAVENOUS; SUBCUTANEOUS PRN
Status: DISCONTINUED | OUTPATIENT
Start: 2019-09-01 | End: 2019-09-01

## 2019-09-01 RX ORDER — LABETALOL HYDROCHLORIDE 5 MG/ML
10 INJECTION, SOLUTION INTRAVENOUS EVERY 10 MIN PRN
Status: DISCONTINUED | OUTPATIENT
Start: 2019-09-01 | End: 2019-09-01 | Stop reason: HOSPADM

## 2019-09-01 RX ORDER — ONDANSETRON 2 MG/ML
INJECTION INTRAMUSCULAR; INTRAVENOUS PRN
Status: DISCONTINUED | OUTPATIENT
Start: 2019-09-01 | End: 2019-09-01

## 2019-09-01 RX ORDER — HYDROMORPHONE HYDROCHLORIDE 1 MG/ML
.3-.5 INJECTION, SOLUTION INTRAMUSCULAR; INTRAVENOUS; SUBCUTANEOUS EVERY 5 MIN PRN
Status: DISCONTINUED | OUTPATIENT
Start: 2019-09-01 | End: 2019-09-01 | Stop reason: HOSPADM

## 2019-09-01 RX ORDER — INDOMETHACIN 50 MG/1
100 SUPPOSITORY RECTAL
Status: DISCONTINUED | OUTPATIENT
Start: 2019-09-01 | End: 2019-09-01 | Stop reason: HOSPADM

## 2019-09-01 RX ORDER — ESMOLOL HYDROCHLORIDE 10 MG/ML
INJECTION INTRAVENOUS PRN
Status: DISCONTINUED | OUTPATIENT
Start: 2019-09-01 | End: 2019-09-01

## 2019-09-01 RX ORDER — FLUMAZENIL 0.1 MG/ML
0.2 INJECTION, SOLUTION INTRAVENOUS
Status: ACTIVE | OUTPATIENT
Start: 2019-09-01 | End: 2019-09-02

## 2019-09-01 RX ORDER — ONDANSETRON 2 MG/ML
4 INJECTION INTRAMUSCULAR; INTRAVENOUS EVERY 30 MIN PRN
Status: DISCONTINUED | OUTPATIENT
Start: 2019-09-01 | End: 2019-09-01 | Stop reason: HOSPADM

## 2019-09-01 RX ORDER — LABETALOL 20 MG/4 ML (5 MG/ML) INTRAVENOUS SYRINGE
PRN
Status: DISCONTINUED | OUTPATIENT
Start: 2019-09-01 | End: 2019-09-01

## 2019-09-01 RX ORDER — FENTANYL CITRATE 50 UG/ML
INJECTION, SOLUTION INTRAMUSCULAR; INTRAVENOUS PRN
Status: DISCONTINUED | OUTPATIENT
Start: 2019-09-01 | End: 2019-09-01

## 2019-09-01 RX ORDER — LIDOCAINE 40 MG/G
CREAM TOPICAL
Status: DISCONTINUED | OUTPATIENT
Start: 2019-09-01 | End: 2019-09-01 | Stop reason: HOSPADM

## 2019-09-01 RX ADMIN — BRIMONIDINE TARTRATE 1 DROP: 2 SOLUTION/ DROPS OPHTHALMIC at 18:26

## 2019-09-01 RX ADMIN — FENTANYL CITRATE 35 MCG: 50 INJECTION, SOLUTION INTRAMUSCULAR; INTRAVENOUS at 14:15

## 2019-09-01 RX ADMIN — HYDROMORPHONE HYDROCHLORIDE 0.2 MG: 1 INJECTION, SOLUTION INTRAMUSCULAR; INTRAVENOUS; SUBCUTANEOUS at 18:26

## 2019-09-01 RX ADMIN — ONDANSETRON 4 MG: 2 INJECTION INTRAMUSCULAR; INTRAVENOUS at 14:36

## 2019-09-01 RX ADMIN — LABETALOL 20 MG/4 ML (5 MG/ML) INTRAVENOUS SYRINGE 10 MG: at 15:04

## 2019-09-01 RX ADMIN — LIDOCAINE HYDROCHLORIDE 100 MG: 20 INJECTION, SOLUTION INFILTRATION; PERINEURAL at 14:15

## 2019-09-01 RX ADMIN — SUGAMMADEX 200 MG: 100 INJECTION, SOLUTION INTRAVENOUS at 14:52

## 2019-09-01 RX ADMIN — PIPERACILLIN AND TAZOBACTAM 3.38 G: 3; .375 INJECTION, POWDER, LYOPHILIZED, FOR SOLUTION INTRAVENOUS at 05:39

## 2019-09-01 RX ADMIN — PHENYLEPHRINE HYDROCHLORIDE 100 MCG: 10 INJECTION INTRAVENOUS at 14:20

## 2019-09-01 RX ADMIN — LATANOPROST 1 DROP: 50 SOLUTION/ DROPS OPHTHALMIC at 20:31

## 2019-09-01 RX ADMIN — DORZOLAMIDE HYDROCHLORIDE AND TIMOLOL MALEATE 1 DROP: 20; 5 SOLUTION/ DROPS OPHTHALMIC at 08:59

## 2019-09-01 RX ADMIN — HYDRALAZINE HYDROCHLORIDE 2.5 MG: 20 INJECTION INTRAMUSCULAR; INTRAVENOUS at 15:25

## 2019-09-01 RX ADMIN — CARBIDOPA AND LEVODOPA 1 TABLET: 25; 100 TABLET, EXTENDED RELEASE ORAL at 21:05

## 2019-09-01 RX ADMIN — DEXAMETHASONE SODIUM PHOSPHATE 4 MG: 4 INJECTION, SOLUTION INTRA-ARTICULAR; INTRALESIONAL; INTRAMUSCULAR; INTRAVENOUS; SOFT TISSUE at 14:36

## 2019-09-01 RX ADMIN — PIPERACILLIN AND TAZOBACTAM 3.38 G: 3; .375 INJECTION, POWDER, LYOPHILIZED, FOR SOLUTION INTRAVENOUS at 18:31

## 2019-09-01 RX ADMIN — SODIUM CHLORIDE: 9 INJECTION, SOLUTION INTRAVENOUS at 22:53

## 2019-09-01 RX ADMIN — CAMPHOR AND MENTHOL: .6; .6 LOTION TOPICAL at 21:05

## 2019-09-01 RX ADMIN — BRIMONIDINE TARTRATE 1 DROP: 2 SOLUTION/ DROPS OPHTHALMIC at 08:59

## 2019-09-01 RX ADMIN — LABETALOL 20 MG/4 ML (5 MG/ML) INTRAVENOUS SYRINGE 10 MG: at 14:51

## 2019-09-01 RX ADMIN — ESMOLOL HYDROCHLORIDE 20 MG: 10 INJECTION, SOLUTION INTRAVENOUS at 14:19

## 2019-09-01 RX ADMIN — DORZOLAMIDE HYDROCHLORIDE AND TIMOLOL MALEATE 1 DROP: 20; 5 SOLUTION/ DROPS OPHTHALMIC at 18:17

## 2019-09-01 RX ADMIN — ESMOLOL HYDROCHLORIDE 30 MG: 10 INJECTION, SOLUTION INTRAVENOUS at 14:59

## 2019-09-01 RX ADMIN — FINASTERIDE 5 MG: 5 TABLET, FILM COATED ORAL at 20:29

## 2019-09-01 RX ADMIN — Medication 1 DROP: at 21:05

## 2019-09-01 RX ADMIN — SODIUM CHLORIDE, POTASSIUM CHLORIDE, SODIUM LACTATE AND CALCIUM CHLORIDE: 600; 310; 30; 20 INJECTION, SOLUTION INTRAVENOUS at 14:06

## 2019-09-01 RX ADMIN — ACETAMINOPHEN 650 MG: 325 TABLET ORAL at 21:05

## 2019-09-01 RX ADMIN — ROCURONIUM BROMIDE 50 MG: 10 INJECTION INTRAVENOUS at 14:16

## 2019-09-01 RX ADMIN — CARBIDOPA AND LEVODOPA 1 TABLET: 25; 100 TABLET, EXTENDED RELEASE ORAL at 08:59

## 2019-09-01 RX ADMIN — HYDRALAZINE HYDROCHLORIDE 10 MG: 20 INJECTION INTRAMUSCULAR; INTRAVENOUS at 16:52

## 2019-09-01 RX ADMIN — LABETALOL HYDROCHLORIDE 10 MG: 5 INJECTION INTRAVENOUS at 16:28

## 2019-09-01 RX ADMIN — Medication 15 MG: at 14:44

## 2019-09-01 RX ADMIN — LABETALOL HYDROCHLORIDE 10 MG: 5 INJECTION INTRAVENOUS at 16:07

## 2019-09-01 RX ADMIN — PIPERACILLIN AND TAZOBACTAM 3.38 G: 3; .375 INJECTION, POWDER, LYOPHILIZED, FOR SOLUTION INTRAVENOUS at 22:53

## 2019-09-01 RX ADMIN — LEVOTHYROXINE SODIUM 50 MCG: 50 TABLET ORAL at 08:59

## 2019-09-01 RX ADMIN — ESMOLOL HYDROCHLORIDE 20 MG: 10 INJECTION, SOLUTION INTRAVENOUS at 14:22

## 2019-09-01 RX ADMIN — CARBIDOPA AND LEVODOPA 1 TABLET: 25; 100 TABLET, EXTENDED RELEASE ORAL at 18:27

## 2019-09-01 RX ADMIN — ESMOLOL HYDROCHLORIDE 20 MG: 10 INJECTION, SOLUTION INTRAVENOUS at 14:57

## 2019-09-01 RX ADMIN — ESMOLOL HYDROCHLORIDE 10 MG: 10 INJECTION, SOLUTION INTRAVENOUS at 14:58

## 2019-09-01 RX ADMIN — PROPOFOL 100 MG: 10 INJECTION, EMULSION INTRAVENOUS at 14:15

## 2019-09-01 RX ADMIN — ESMOLOL HYDROCHLORIDE 20 MG: 10 INJECTION, SOLUTION INTRAVENOUS at 14:20

## 2019-09-01 RX ADMIN — PIPERACILLIN AND TAZOBACTAM 3.38 G: 3; .375 INJECTION, POWDER, LYOPHILIZED, FOR SOLUTION INTRAVENOUS at 11:10

## 2019-09-01 RX ADMIN — GABAPENTIN 300 MG: 300 CAPSULE ORAL at 21:05

## 2019-09-01 RX ADMIN — ESMOLOL HYDROCHLORIDE 10 MG: 10 INJECTION, SOLUTION INTRAVENOUS at 14:48

## 2019-09-01 ASSESSMENT — ACTIVITIES OF DAILY LIVING (ADL)
ADLS_ACUITY_SCORE: 22.5
ADLS_ACUITY_SCORE: 23.5
ADLS_ACUITY_SCORE: 24.5
ADLS_ACUITY_SCORE: 22.5
ADLS_ACUITY_SCORE: 24.5

## 2019-09-01 NOTE — PROGRESS NOTES
[facility]    Medicine Progress Note - Hospitalist Service       Date of Admission:  8/30/2019  Assessment & Plan   Sylvester Turk is a very pleasant 83 year old gentleman with chronic Lewy Body Dementia, Parkinson's Disease, AAA, BPH, idiopathic peripheral neuropathy, nephrolithiasis, prior left hip fracture, glaucoma and hypothyroid who presents with abdominal pain and is found to have suspected acute choledocholithiasis.    Suspected acute choledocholithiasis with transaminitis   Afebrile.  VSS.  LFTs stable,  / AST 67 / Alk Phos 323 / t Bili 1.3.  No leukocytosis.  Unable to tolerate MRCP today; agitated and pulling at lines.  ERCP planned for later today under GA.  GI and Gen Surg following, appreciate assistance.  Family on board with cholecystectomy if indicated.      Enlarging AAA  AAA now 7.6 cm.  Vasc Surgery evaluated; family has deferred intervention.    Parkinsons disease / Dementia / PN  Minimally ambulatory at baseline, quite cognitively impaired.  PTA Sinemet, gabapentin, pimavanserin (Nuplazid), seroquel resumed.     Diet: NPO per Anesthesia Guidelines for Procedure/Surgery Except for: Meds    DVT Prophylaxis: Pneumatic Compression Devices  Gaines Catheter: not present  Code Status: DNR/DNI      DISPO:  Back to NH in 1-3 days depending on results of ERCP and surgical plan.     The patient's care was discussed with the Attending Physician, Dr. Gary and Bedside Nurse.    EVA Rodriguez, PA  Hospitalist Service  [facility]    ______________________________________________________________________    Interval History   Unable to tolerate MRCP this AM.  No pain.  Family at bedside.     Data reviewed today: I reviewed all medications, new labs and imaging results over the last 24 hours. I personally reviewed no images or EKG's today.    Physical Exam   Vital Signs: Temp: 97.7  F (36.5  C) Temp src: Oral BP: (!) 159/98 Pulse: 63 Heart Rate: 88 Resp: 18 SpO2:  97 % O2 Device: None (Room air)    Weight: 169 lbs 8.54 oz  GENERAL:  Pleasant, family at bedside.  Awake but sleepy  HEENT: Normocephalic, atraumatic.     PULMONOLOGY:  Unlabored  MUSCULOSKELETAL:   Ext wwp  NEURO:  Alert and oriented to self and family    Data   Recent Labs   Lab 09/01/19  0919 08/31/19  0748 08/30/19  1928   WBC  --  8.3 7.4   HGB  --  11.9* 11.7*   MCV  --  81 84   PLT  --  181 201   INR 1.07 1.07  --     139 139   POTASSIUM 3.5 3.6 3.5   CHLORIDE 104 105 103   CO2 29 28 31   BUN 13 14 18   CR 0.65* 0.62* 0.69   ANIONGAP 4 6 5   THI 8.8 8.5 8.7   GLC 74 90 116*   ALBUMIN 3.1* 3.0* 3.1*   PROTTOTAL 6.7* 6.2* 6.5*   BILITOTAL 1.3 0.7 1.0   ALKPHOS 323* 347* 377*   * 134* 142*   AST 67* 118* 145*     No results found for this or any previous visit (from the past 24 hour(s)).  Medications     sodium chloride 50 mL/hr at 08/31/19 2147       brimonidine  1 drop Right Eye BID     camphor-menthol   Topical At Bedtime     carbidopa-levodopa  1 tablet Oral TID     carboxymethylcellulose PF  1 drop Both Eyes At Bedtime     dorzolamide-timolol  1 drop Right Eye BID     finasteride  5 mg Oral Daily     gabapentin  300 mg Oral At Bedtime     latanoprost  1 drop Right Eye At Bedtime     levothyroxine  50 mcg Oral Daily     pimavanserin  34 mg Oral Daily     piperacillin-tazobactam  3.375 g Intravenous Q6H     sodium chloride (PF)  3 mL Intracatheter Q8H

## 2019-09-01 NOTE — ANESTHESIA CARE TRANSFER NOTE
Patient: Sylvester Turk    Procedure(s):  ENDOSCOPIC RETROGRADE CHOLANGIOPANCREATOGRAPHY, WITH CALCULUS REMOVAL USING BALLOON CATHETER  ENDOSCOPIC RETROGRADE CHOLANGIOPANCREATOGRAPHY, WITH SPHINCTEROTOMY    Diagnosis: ERCP  Diagnosis Additional Information: No value filed.    Anesthesia Type:   General, ETT     Note:  Airway :Face Mask  Patient transferred to:PACU  Comments: BP: (!) 169/109  Pulse: 63  Resp: 18  SpO2: 98 %  Temp: 36.5  C (97.7  F)    TOF 4/4 with sustained tetany > 5secs. Spontaneous resp with tidal volume >400ml.. Followed commands et purposeful. Strong tongue thrust. Extubated with cuff down. Pt maintains resp. O2 sat > 97%. Simple face mask on with 10l O2.To PACU: VSS, placed on monitors with alarms on. Report given to RN.;       Vitals: (Last set prior to Anesthesia Care Transfer)    CRNA VITALS  9/1/2019 1436 - 9/1/2019 1512      9/1/2019             Pulse:  60    ART BP:  150/77    ART Mean:  108    Ht Rate:  60    SpO2:  99 %    Resp Rate (set):  10                Electronically Signed By: ADONIS Bray CRNA  September 1, 2019  3:12 PM

## 2019-09-01 NOTE — PROGRESS NOTES
[facility] - GASTROENTEROLOGY PROGRESS NOTE     IMPRESSION:  1. Increased LFTs - unable to tolerate MRCP, ongoing LFT increased. Patient's family understands the risk of AAA with general anesthesia. They would like to proceed with the ERCP with GA. Discussed with Dr. Bennett.    RECOMMENDATIONS:  1. ERCP with GA today    Keven Shultz  [facility]  Cell [phone]  After 5 PM, please call [phone]  ________________________________________________________________________      SUBJECTIVE:  Patient denies recurrence of abdominal pain.        OBJECTIVE:  BP (!) 159/98 (BP Location: Left arm)   Pulse 63   Temp 97.7  F (36.5  C) (Oral)   Resp 18   Wt 76.9 kg (169 lb 8.5 oz)   SpO2 97%   BMI 21.77 kg/m    Temp (24hrs), Av  F (36.7  C), Min:97.7  F (36.5  C), Max:98.1  F (36.7  C)    Patient Vitals for the past 72 hrs:   Weight   19 0415 76.9 kg (169 lb 8.5 oz)   19 0600 76.2 kg (167 lb 15.9 oz)        PHYSICAL EXAM  GEN: Alert, NAD.    HRT: reg  LUNGS: CTA  ABD: +BS, non-tender, non-distended, no rebound or guarding.    Additional Data:  I have reviewed the patient's new clinical lab results:     Recent Labs   Lab Test 1948 19   WBC  --  8.3 7.4 8.1   HGB  --  11.9* 11.7* 9.0*   MCV  --  81 84 78   PLT  --  181 201 176   INR 1.07 1.07  --   --      Recent Labs   Lab Test 1948 19   POTASSIUM 3.5 3.6 3.5   CHLORIDE 104 105 103   CO2 29 28 31   BUN 13 14 18   ANIONGAP 4 6 5     Recent Labs   Lab Test 19  0919 19  0748 19  1928   ALBUMIN 3.1* 3.0* 3.1*   BILITOTAL 1.3 0.7 1.0   * 134* 142*   AST 67* 118* 145*

## 2019-09-01 NOTE — OR NURSING
ERCP with sphincterotomy, balloon dialtion and stone extraction done in OR 30 with Dr. Bennett. No specimens obtained and sedation per anesthesia.

## 2019-09-01 NOTE — ANESTHESIA PROCEDURE NOTES
ARTERIAL LINE PROCEDURE NOTE:   Pre-Procedure  Performed by Lucien Dorman MD  Location: pre-op    Procedure Times:9/1/2019 2:04 PM and 9/1/2019 2:09 PM  Pre-Anesthestic Checklist: patient identified, IV checked, risks and benefits discussed, informed consent, monitors and equipment checked, pre-op evaluation and at physician/surgeon's request    Timeout  Correct Patient: Yes   Correct Procedure: Yes   Correct Site: Yes   Correct Laterality: Yes   Correct Position: Yes     .   Procedure Documentation  Procedure: arterial line    Supine  Insertion Site:radial, right.Injection technique: Seldinger Technique  .  .  Patient Prep;chlorhexidine gluconate and isopropyl alcohol    Assessment/Narrative    Catheter: 20 gauge, 1.75 in/4.5 cm quick cath (integral wire)      Tegaderm dressing used.    Arterial waveform: Yes IBP within 10% of NIBP: Yes

## 2019-09-01 NOTE — OR NURSING
1558. Patient blood pressure 177/115. Notify anesthesiologist MD Dorman. Md ordered labetalol. See MAR

## 2019-09-01 NOTE — ANESTHESIA PREPROCEDURE EVALUATION
Anesthesia Pre-Procedure Evaluation    Patient: Sylvester Turk   MRN: [id number] : 1935          Preoperative Diagnosis: ERCP    Procedure(s):  ENDOSCOPIC RETROGRADE CHOLANGIOPANCREATOGRAPHY    Past Medical History:   Diagnosis Date     Abdominal aortic aneurysm (AAA) (H)      Anxiety      Arthritis      Basal cell carcinoma      Benign prostatic hyperplasia with lower urinary tract symptoms      BPH (benign prostatic hyperplasia)      Dementia with Lewy bodies      Femur fracture (H)      Glaucoma      Hypothyroidism      Kidney stones      Parkinson's disease (H)      Parkinsons disease (H)      Peripheral neuropathy      Past Surgical History:   Procedure Laterality Date     APPENDECTOMY       CATARACT IOL, RT/LT Left 05/10/2019     EXAM UNDER ANESTHESIA EYE(S) Bilateral 5/10/2019    Procedure: Exam under anesthesia eye(s);  Surgeon: Geo Rodriguez MD;  Location: [facility] OR     EYE SURGERY       OPEN REDUCTION INTERNAL FIXATION HIP BIPOLAR Left 12/15/2018    Procedure: Left hip hemiarthroplasty;  Surgeon: Ronny Tony MD;  Location: [facility] OR     PHACOEMULSIFICATION CLEAR CORNEA WITH STANDARD INTRAOCULAR LENS IMPLANT Left 5/10/2019    Procedure: Left Eye Phacoemulcification Clear Cornea with Standard Lens Implant;  Surgeon: Geo Rodriguez MD;  Location: [facility]       Anesthesia Evaluation     .             ROS/MED HX    ENT/Pulmonary:  - neg pulmonary ROS     Neurologic:     (+)Parkinson's disease     Cardiovascular:     (+) -Peripheral Vascular Disease (AAA - 7.6 cm)-- Other, --. : . . . :. .       METS/Exercise Tolerance:     Hematologic:         Musculoskeletal:         GI/Hepatic:     (+) cholecystitis/cholelithiasis, liver disease,       Renal/Genitourinary:         Endo:     (+) thyroid problem hypothyroidism, .      Psychiatric:         Infectious Disease:         Malignancy:         Other:                          Physical Exam  Normal systems: cardiovascular, pulmonary and dental    Airway    Mallampati: III  TM distance: >3 FB  Neck ROM: full    Dental     Cardiovascular   Rhythm and rate: regular and normal      Pulmonary    breath sounds clear to auscultation        EXAM: CT ABDOMEN PELVIS W CONTRAST  LOCATION: [facility]  DATE/TIME: 8/30/2019 8:12 PM     INDICATION: Known abdominal aortic aneurysm. Abdominal pain.  COMPARISON: None.  TECHNIQUE: Helical enhanced thin-section CT scan of the abdomen and pelvis was performed following injection of IV contrast. Multiplanar reformats were obtained. Dose reduction techniques were used.  CONTRAST: 91 mL Isovue-370.     FINDINGS:   LUNG BASES: Coronary artery calcification. The heart is mildly enlarged.     ABDOMEN: 7.6 x 6.0 cm abdominal aortic aneurysm with a large amount of mural thrombus. No retroperitoneal fluid. 5 mm nonobstructing stone lower pole left kidney. A few cysts within the kidneys with a few additional tiny nonobstructing stones in the   kidneys. Gallbladder wall thickening. There is significant dilatation of the common bile duct which measures 2.2 cm proximally with significant intrahepatic bile duct dilatation. Negative pancreas. ERCP is recommended in further evaluation to evaluate   the ampulla. There is no evidence for bowel obstruction or distention. Spleen, adrenal glands are negative.     PELVIS: Aneurysmal dilatation right common iliac artery measuring 2.6 cm. Streak artifact from left hip arthroplasty.     MUSCULOSKELETAL: Lumbar degenerative change.                                                                      CONCLUSION:   1.  Gallbladder wall thickening. There is significant dilatation of the common bile duct which measures up to 2.2 cm proximally with intrahepatic bile duct dilatation. No pancreatic masses. ERCP is recommended in further evaluation to evaluate the   ampulla.     2.  7.6 cm infrarenal abdominal aortic aneurysm with a large amount of mural thrombus. No retroperitoneal hemorrhage.     3.  No  "evidence of bowel obstruction.     4.  Left hip arthroplasty.     5.  Nonobstructing renal stones the largest in the lower pole left kidney measuring 5 mm.      Order-Level Documents:     There are no order-level documents.   Lab and Collection     Abd/pelvis CT,  IV  contrast only TRAUMA / AAA (Order: [id number]) - 8/30/2019   Result History     Abd/pelvis CT,  IV  contrast only TRAUMA / AAA (Order #[id number]) on 8/30/2019 - Order Result History Report   Result Information     Status: Final result (Exam End: 8/30/2019 20:21) Provider Status: Open   Reading Providers      Reading Role Read Date   Dougie Foster MD           Lab Results   Component Value Date    WBC 8.3 09/01/2019    HGB 12.7 (L) 09/01/2019    HCT 39.0 (L) 09/01/2019     09/01/2019     09/01/2019    POTASSIUM 3.5 09/01/2019    CHLORIDE 104 09/01/2019    CO2 29 09/01/2019    BUN 13 09/01/2019    CR 0.65 (L) 09/01/2019    GLC 74 09/01/2019    THI 8.8 09/01/2019    ALBUMIN 3.1 (L) 09/01/2019    PROTTOTAL 6.7 (L) 09/01/2019     (H) 09/01/2019    AST 67 (H) 09/01/2019    ALKPHOS 323 (H) 09/01/2019    BILITOTAL 1.3 09/01/2019    INR 1.07 09/01/2019       Preop Vitals  BP Readings from Last 3 Encounters:   09/01/19 (!) 169/109   08/30/19 (!) 197/120   05/10/19 (!) 140/95    Pulse Readings from Last 3 Encounters:   08/31/19 63   05/10/19 67   02/09/19 96      Resp Readings from Last 3 Encounters:   09/01/19 18   08/30/19 28   05/10/19 11    SpO2 Readings from Last 3 Encounters:   09/01/19 98%   08/30/19 100%   05/10/19 96%      Temp Readings from Last 1 Encounters:   09/01/19 36.5  C (97.7  F) (Temporal)    Ht Readings from Last 1 Encounters:   05/10/19 1.88 m (6' 2\")      Wt Readings from Last 1 Encounters:   09/01/19 76.9 kg (169 lb 8.5 oz)    Estimated body mass index is 21.77 kg/m  as calculated from the following:    Height as of 5/10/19: 1.88 m (6' 2\").    Weight as of this encounter: 76.9 kg (169 lb 8.5 oz).       Anesthesia " Plan      History & Physical Review  History and physical reviewed and following examination; no interval change.    ASA Status:  4 emergent.    NPO Status:  > 6 hours    Plan for General and ETT with Intravenous induction. Maintenance will be Balanced.    PONV prophylaxis:  Ondansetron (or other 5HT-3) and Dexamethasone or Solumedrol  Additional equipment: Videolaryngoscope and Arterial Line     Long discussion with his wife and son regarding the risks of anesthesia - largely focused on risk of rupture of AAA. This was discovered in 2018 and the family had not elected to pursue treatment at that point in time. They also discussed the risks of anesthesia and the AAA with Dr. Mcginnis of vascular surgery.      Postoperative Care  Postoperative pain management:  Oral pain medications.      Consents  Anesthetic plan, risks, benefits and alternatives discussed with:  Spouse and Daughter/Son..                 Lucien Dorman MD

## 2019-09-01 NOTE — PROGRESS NOTES
Awaiting final report on MRCP  Appears to have several CBD stones by my review of images  Await gi plans  Will follow

## 2019-09-01 NOTE — PROGRESS NOTES
VASCULAR SURGERY    I called Sylvester Turk daughter Dot about the AAA.     We discussed the implications of the AAA.  Obviously the larger the aneurysm more likely with rupture which would be lethal.  Patient's family had discusses when the larger size of 7.1 cm was noted on the scan for his hip injury this past December.  They were not interested in surgical repair due to his declining health including the Parkinson's disease and dementia and the fact he is no longer ambulatory.    Though this could be repaired likely via endovascular technique which has a much easier recovery doing the family are still very comfortable with her decision of no treatment.  They would like the gallbladder issue dealt with.  Aware that there is a potential risk of rupture but unlikely with these procedures.    Thus, the family is very comfortable with no treatment at all at any time with the AAA.  If this should rupture Comfort Care only would be done.  No further follow-up of the AAA as needed.  All family members are comfortable with this decision.        I spent 25 minutes with the patient and then discussing with the family today.       Deonte Mcginnis MD

## 2019-09-02 LAB
ALBUMIN SERPL-MCNC: 3 G/DL (ref 3.4–5)
ALP SERPL-CCNC: 314 U/L (ref 40–150)
ALT SERPL W P-5'-P-CCNC: 69 U/L (ref 0–70)
AST SERPL W P-5'-P-CCNC: 60 U/L (ref 0–45)
BILIRUB DIRECT SERPL-MCNC: 0.6 MG/DL (ref 0–0.2)
BILIRUB SERPL-MCNC: 1.1 MG/DL (ref 0.2–1.3)
PROT SERPL-MCNC: 6.5 G/DL (ref 6.8–8.8)

## 2019-09-02 PROCEDURE — 25800030 ZZH RX IP 258 OP 636: Performed by: HOSPITALIST

## 2019-09-02 PROCEDURE — 25000132 ZZH RX MED GY IP 250 OP 250 PS 637: Mod: GY | Performed by: INTERNAL MEDICINE

## 2019-09-02 PROCEDURE — 12000000 ZZH R&B MED SURG/OB

## 2019-09-02 PROCEDURE — 25000128 H RX IP 250 OP 636: Performed by: INTERNAL MEDICINE

## 2019-09-02 PROCEDURE — 80076 HEPATIC FUNCTION PANEL: CPT | Performed by: INTERNAL MEDICINE

## 2019-09-02 PROCEDURE — 25000132 ZZH RX MED GY IP 250 OP 250 PS 637: Mod: GY | Performed by: HOSPITALIST

## 2019-09-02 PROCEDURE — 36415 COLL VENOUS BLD VENIPUNCTURE: CPT | Performed by: INTERNAL MEDICINE

## 2019-09-02 PROCEDURE — 99232 SBSQ HOSP IP/OBS MODERATE 35: CPT | Performed by: INTERNAL MEDICINE

## 2019-09-02 PROCEDURE — 25000132 ZZH RX MED GY IP 250 OP 250 PS 637: Mod: GY | Performed by: PHYSICIAN ASSISTANT

## 2019-09-02 RX ORDER — NALOXONE HYDROCHLORIDE 0.4 MG/ML
.1-.4 INJECTION, SOLUTION INTRAMUSCULAR; INTRAVENOUS; SUBCUTANEOUS
Status: DISCONTINUED | OUTPATIENT
Start: 2019-09-02 | End: 2019-09-05 | Stop reason: HOSPADM

## 2019-09-02 RX ADMIN — CARBIDOPA AND LEVODOPA 1 TABLET: 25; 100 TABLET, EXTENDED RELEASE ORAL at 08:33

## 2019-09-02 RX ADMIN — GABAPENTIN 300 MG: 300 CAPSULE ORAL at 21:51

## 2019-09-02 RX ADMIN — BRIMONIDINE TARTRATE 1 DROP: 2 SOLUTION/ DROPS OPHTHALMIC at 08:38

## 2019-09-02 RX ADMIN — PIPERACILLIN AND TAZOBACTAM 3.38 G: 3; .375 INJECTION, POWDER, LYOPHILIZED, FOR SOLUTION INTRAVENOUS at 16:26

## 2019-09-02 RX ADMIN — FINASTERIDE 5 MG: 5 TABLET, FILM COATED ORAL at 19:52

## 2019-09-02 RX ADMIN — SODIUM CHLORIDE: 9 INJECTION, SOLUTION INTRAVENOUS at 21:51

## 2019-09-02 RX ADMIN — PIPERACILLIN AND TAZOBACTAM 3.38 G: 3; .375 INJECTION, POWDER, LYOPHILIZED, FOR SOLUTION INTRAVENOUS at 04:46

## 2019-09-02 RX ADMIN — BRIMONIDINE TARTRATE 1 DROP: 2 SOLUTION/ DROPS OPHTHALMIC at 16:25

## 2019-09-02 RX ADMIN — CARBIDOPA AND LEVODOPA 1 TABLET: 25; 100 TABLET, EXTENDED RELEASE ORAL at 21:54

## 2019-09-02 RX ADMIN — PIPERACILLIN AND TAZOBACTAM 3.38 G: 3; .375 INJECTION, POWDER, LYOPHILIZED, FOR SOLUTION INTRAVENOUS at 10:23

## 2019-09-02 RX ADMIN — DORZOLAMIDE HYDROCHLORIDE AND TIMOLOL MALEATE 1 DROP: 20; 5 SOLUTION/ DROPS OPHTHALMIC at 16:25

## 2019-09-02 RX ADMIN — PIMAVANSERIN TARTRATE 34 MG: 17 TABLET, COATED ORAL at 08:33

## 2019-09-02 RX ADMIN — LATANOPROST 1 DROP: 50 SOLUTION/ DROPS OPHTHALMIC at 19:52

## 2019-09-02 RX ADMIN — CAMPHOR AND MENTHOL: .6; .6 LOTION TOPICAL at 22:06

## 2019-09-02 RX ADMIN — Medication 1 DROP: at 21:51

## 2019-09-02 RX ADMIN — CARBIDOPA AND LEVODOPA 1 TABLET: 25; 100 TABLET, EXTENDED RELEASE ORAL at 16:26

## 2019-09-02 RX ADMIN — LEVOTHYROXINE SODIUM 50 MCG: 50 TABLET ORAL at 06:29

## 2019-09-02 RX ADMIN — DORZOLAMIDE HYDROCHLORIDE AND TIMOLOL MALEATE 1 DROP: 20; 5 SOLUTION/ DROPS OPHTHALMIC at 08:38

## 2019-09-02 ASSESSMENT — ACTIVITIES OF DAILY LIVING (ADL)
ADLS_ACUITY_SCORE: 43
ADLS_ACUITY_SCORE: 45
ADLS_ACUITY_SCORE: 45
ADLS_ACUITY_SCORE: 41
ADLS_ACUITY_SCORE: 41
ADLS_ACUITY_SCORE: 45

## 2019-09-02 NOTE — PROGRESS NOTES
Surgery    S: [birth year] birthday.  He appears tearful. Could not clearly elicit why he is sad. But he states he is very hungry.  Mentioned a twin brother.    O:  VSS AF, ORA  Tearful this morning.  Oriented to self only.  Abdomen is soft, minimally distended, mildly tender to palpation epigastrium.    Bili 1.3>1.1  Alk phos 323>314  >69  AST 67>60    A/P:  83M with PMH lewy body dementia. parkinson's, AAA, BPH, nephroliothiasis, who presented with acute choledocholithiasis, s/p ERCP yesterday 9/1.  Likely multiple small stones in the distal common bile duct were present.  Stones removed.  LFTs improved.    -- Will plan for cholecystectomy, likely tomorrow. Family has been on board with proceeding with this..  -- Continue to trend LFTs.  -- Ok for diet as ordered per GI today.  -- NPO at midnight.    Keila Duque MD  PGY4 General Surgery Resident

## 2019-09-02 NOTE — PROGRESS NOTES
[facility] - GASTROENTEROLOGY PROGRESS NOTE     IMPRESSION:  1. Choledocholithiasis - s/p ERCP and stone removal yesterday, clinically doing well. Surgery planning on cholecystectomy tomorrow.    RECOMMENDATIONS:  1. Defer further management to primary service and surgery.  2. We will sign off, please call with any questions or concerns.    Keven Shultz  [facility] - Digestive Health  Cell [phone]  After 5 PM, please call [phone]  ________________________________________________________________________      SUBJECTIVE:  Patient has done well after ERCP       OBJECTIVE:  /76 (BP Location: Left arm)   Pulse 74   Temp 98  F (36.7  C) (Axillary)   Resp 16   Wt 77 kg (169 lb 12.1 oz)   SpO2 99%   BMI 21.80 kg/m    Temp (24hrs), Av.7  F (36.5  C), Min:97.2  F (36.2  C), Max:98  F (36.7  C)    Patient Vitals for the past 72 hrs:   Weight   19 0633 77 kg (169 lb 12.1 oz)   19 0415 76.9 kg (169 lb 8.5 oz)   19 0600 76.2 kg (167 lb 15.9 oz)        PHYSICAL EXAM  GEN: Alert, remainder of exam deferred, family present and clinically better.     Additional Data:  I have reviewed the patient's new clinical lab results:     Recent Labs   Lab Test 19  0748 198   WBC 8.3 8.3 7.4   HGB 12.7* 11.9* 11.7*   MCV 80 81 84    181 201   INR 1.07 1.07  --      Recent Labs   Lab Test 19  0748 19  1928   POTASSIUM 3.5 3.6 3.5   CHLORIDE 104 105 103   CO2 29 28 31   BUN 13 14 18   ANIONGAP 4 6 5     Recent Labs   Lab Test 19  0654 1931/19  0748   ALBUMIN 3.0* 3.1* 3.0*   BILITOTAL 1.1 1.3 0.7   ALT 69 126* 134*   AST 60* 67* 118*

## 2019-09-02 NOTE — PROGRESS NOTES
[facility] [place]    Medicine Progress Note - Hospitalist Service       Date of Admission:  8/30/2019  Assessment & Plan   Sylvester Turk is a very pleasant 84 year old gentleman with chronic Lewy Body Dementia, Parkinson's Disease, AAA, BPH, idiopathic peripheral neuropathy, nephrolithiasis, prior left hip fracture, glaucoma and hypothyroid who presents with abdominal pain and is found to have suspected acute choledocholithiasis.    Acute choledocholithiasis with elevated liver enzyme  Status post ERCP with extraction of stones and sphincterotomy.    Patient remained afebrile, pain no more pain at this time.  Liver enzymes are trending down no leukocytosis.    Continue IV Zosyn at this time.  General surgery is following, most likely laparoscopic cholecystectomy tomorrow.    Enlarging AAA  AAA now 7.6 cm.  Vasc Surgery evaluated; family has deferred intervention.  Family and patient well aware of the consequences and if rupture will be comfort care.    Parkinsons disease / Dementia / PN  Minimally ambulatory at baseline, quite cognitively impaired.  PTA Sinemet, gabapentin, pimavanserin (Nuplazid), seroquel resumed.     Diet: Regular diet as per surgery. NPO after midnight.     DVT Prophylaxis: Pneumatic Compression Devices  Gaines Catheter: not present  Code Status: DNR/DNI      DISPO: In 2 to 3 days once stable after surgery.        Hema Tobias MD  Hospitalist Service  [facility] [place]    ______________________________________________________________________    Interval History   Patient was sleepy and lethargic at the time of my visit.  But able to get up.  Denies any abdominal pain nausea vomiting at this time.  No fever or chills    No other significant event overnight    Data reviewed today: I reviewed all medications, new labs and imaging results over the last 24 hours. I personally reviewed no images or EKG's today.    Physical Exam   Vital Signs: Temp: 98  F (36.7  C) Temp src:  Axillary BP: 124/81 Pulse: 74 Heart Rate: 69 Resp: 18 SpO2: 98 % O2 Device: None (Room air) Oxygen Delivery: 3 LPM  Weight: 169 lbs 12.07 oz  GENERAL: Lethargic, sleepy at the time of my visit.  But able to get up.  HEENT: Normocephalic, atraumatic.    Abdomen: soft, non tender, no organomegaly.    PULMONOLOGY:  Unlabored  MUSCULOSKELETAL:   Trace edema   NEURO:  Alert and oriented to self and family    Data   Recent Labs   Lab 09/02/19  0654 09/01/19  0919 08/31/19  0748 08/30/19  1928   WBC  --  8.3 8.3 7.4   HGB  --  12.7* 11.9* 11.7*   MCV  --  80 81 84   PLT  --  202 181 201   INR  --  1.07 1.07  --    NA  --  137 139 139   POTASSIUM  --  3.5 3.6 3.5   CHLORIDE  --  104 105 103   CO2  --  29 28 31   BUN  --  13 14 18   CR  --  0.65* 0.62* 0.69   ANIONGAP  --  4 6 5   THI  --  8.8 8.5 8.7   GLC  --  74 90 116*   ALBUMIN 3.0* 3.1* 3.0* 3.1*   PROTTOTAL 6.5* 6.7* 6.2* 6.5*   BILITOTAL 1.1 1.3 0.7 1.0   ALKPHOS 314* 323* 347* 377*   ALT 69 126* 134* 142*   AST 60* 67* 118* 145*     Recent Results (from the past 24 hour(s))   XR ERCP    Narrative    ERCP  9/1/2019 2:59 PM     HISTORY: Suspected choledocholithiasis.    COMPARISON: None.      Impression    IMPRESSION: Three spot fluoroscopic images obtained during ERCP.  Multiple small filling defects of the distal common bile duct on  initial image. Total fluoroscopic time is 1.4 minutes.     MARTINA CORONADO MD     Medications     - MEDICATION INSTRUCTIONS -       sodium chloride 50 mL/hr at 09/01/19 1437       brimonidine  1 drop Right Eye BID     camphor-menthol   Topical At Bedtime     carbidopa-levodopa  1 tablet Oral TID     carboxymethylcellulose PF  1 drop Both Eyes At Bedtime     dorzolamide-timolol  1 drop Right Eye BID     finasteride  5 mg Oral Daily     gabapentin  300 mg Oral At Bedtime     latanoprost  1 drop Right Eye At Bedtime     levothyroxine  50 mcg Oral Daily     pimavanserin  34 mg Oral Daily     piperacillin-tazobactam  3.375 g Intravenous Q6H      sodium chloride (PF)  3 mL Intracatheter Q8H

## 2019-09-03 ENCOUNTER — ANESTHESIA EVENT (OUTPATIENT)
Dept: SURGERY | Facility: CLINIC | Age: 84
DRG: 417 | End: 2019-09-03
Payer: MEDICARE

## 2019-09-03 ENCOUNTER — ANESTHESIA (OUTPATIENT)
Dept: SURGERY | Facility: CLINIC | Age: 84
DRG: 417 | End: 2019-09-03
Payer: MEDICARE

## 2019-09-03 LAB
ALBUMIN SERPL-MCNC: 3.3 G/DL (ref 3.4–5)
ALP SERPL-CCNC: 279 U/L (ref 40–150)
ALT SERPL W P-5'-P-CCNC: 67 U/L (ref 0–70)
ANION GAP SERPL CALCULATED.3IONS-SCNC: 7 MMOL/L (ref 3–14)
AST SERPL W P-5'-P-CCNC: 46 U/L (ref 0–45)
BASOPHILS # BLD AUTO: 0 10E9/L (ref 0–0.2)
BASOPHILS NFR BLD AUTO: 0.4 %
BILIRUB DIRECT SERPL-MCNC: 0.5 MG/DL (ref 0–0.2)
BILIRUB SERPL-MCNC: 1.1 MG/DL (ref 0.2–1.3)
BUN SERPL-MCNC: 11 MG/DL (ref 7–30)
CALCIUM SERPL-MCNC: 8.5 MG/DL (ref 8.5–10.1)
CHLORIDE SERPL-SCNC: 101 MMOL/L (ref 94–109)
CO2 SERPL-SCNC: 26 MMOL/L (ref 20–32)
CREAT SERPL-MCNC: 0.73 MG/DL (ref 0.66–1.25)
DIFFERENTIAL METHOD BLD: ABNORMAL
EOSINOPHIL # BLD AUTO: 0.2 10E9/L (ref 0–0.7)
EOSINOPHIL NFR BLD AUTO: 2.7 %
ERYTHROCYTE [DISTWIDTH] IN BLOOD BY AUTOMATED COUNT: 14.7 % (ref 10–15)
GFR SERPL CREATININE-BSD FRML MDRD: 86 ML/MIN/{1.73_M2}
GLUCOSE SERPL-MCNC: 89 MG/DL (ref 70–99)
HCT VFR BLD AUTO: 40.2 % (ref 40–53)
HGB BLD-MCNC: 13.3 G/DL (ref 13.3–17.7)
IMM GRANULOCYTES # BLD: 0.1 10E9/L (ref 0–0.4)
IMM GRANULOCYTES NFR BLD: 0.8 %
LYMPHOCYTES # BLD AUTO: 3 10E9/L (ref 0.8–5.3)
LYMPHOCYTES NFR BLD AUTO: 33.7 %
MCH RBC QN AUTO: 26.4 PG (ref 26.5–33)
MCHC RBC AUTO-ENTMCNC: 33.1 G/DL (ref 31.5–36.5)
MCV RBC AUTO: 80 FL (ref 78–100)
MONOCYTES # BLD AUTO: 1 10E9/L (ref 0–1.3)
MONOCYTES NFR BLD AUTO: 11.2 %
NEUTROPHILS # BLD AUTO: 4.6 10E9/L (ref 1.6–8.3)
NEUTROPHILS NFR BLD AUTO: 51.2 %
NRBC # BLD AUTO: 0 10*3/UL
NRBC BLD AUTO-RTO: 0 /100
PHOSPHATE SERPL-MCNC: 2 MG/DL (ref 2.5–4.5)
PLATELET # BLD AUTO: 204 10E9/L (ref 150–450)
POTASSIUM SERPL-SCNC: 3.2 MMOL/L (ref 3.4–5.3)
POTASSIUM SERPL-SCNC: 3.5 MMOL/L (ref 3.4–5.3)
PROT SERPL-MCNC: 7 G/DL (ref 6.8–8.8)
RBC # BLD AUTO: 5.03 10E12/L (ref 4.4–5.9)
SODIUM SERPL-SCNC: 134 MMOL/L (ref 133–144)
WBC # BLD AUTO: 9 10E9/L (ref 4–11)

## 2019-09-03 PROCEDURE — 36000056 ZZH SURGERY LEVEL 3 1ST 30 MIN: Performed by: SURGERY

## 2019-09-03 PROCEDURE — 99233 SBSQ HOSP IP/OBS HIGH 50: CPT | Performed by: INTERNAL MEDICINE

## 2019-09-03 PROCEDURE — 40000275 ZZH STATISTIC RCP TIME EA 10 MIN

## 2019-09-03 PROCEDURE — 47562 LAPAROSCOPIC CHOLECYSTECTOMY: CPT | Performed by: SURGERY

## 2019-09-03 PROCEDURE — 84075 ASSAY ALKALINE PHOSPHATASE: CPT | Performed by: INTERNAL MEDICINE

## 2019-09-03 PROCEDURE — 12000000 ZZH R&B MED SURG/OB

## 2019-09-03 PROCEDURE — 82248 BILIRUBIN DIRECT: CPT | Performed by: INTERNAL MEDICINE

## 2019-09-03 PROCEDURE — 25000128 H RX IP 250 OP 636: Performed by: ANESTHESIOLOGY

## 2019-09-03 PROCEDURE — 36415 COLL VENOUS BLD VENIPUNCTURE: CPT | Performed by: HOSPITALIST

## 2019-09-03 PROCEDURE — 27210338 ZZH CIRCUIT HUMID FACE/TRACH MSK

## 2019-09-03 PROCEDURE — 25000125 ZZHC RX 250: Performed by: SURGERY

## 2019-09-03 PROCEDURE — 36415 COLL VENOUS BLD VENIPUNCTURE: CPT | Performed by: INTERNAL MEDICINE

## 2019-09-03 PROCEDURE — 25000125 ZZHC RX 250: Performed by: NURSE ANESTHETIST, CERTIFIED REGISTERED

## 2019-09-03 PROCEDURE — 25800030 ZZH RX IP 258 OP 636: Performed by: HOSPITALIST

## 2019-09-03 PROCEDURE — 25000132 ZZH RX MED GY IP 250 OP 250 PS 637: Mod: GY | Performed by: PHYSICIAN ASSISTANT

## 2019-09-03 PROCEDURE — 0FT44ZZ RESECTION OF GALLBLADDER, PERCUTANEOUS ENDOSCOPIC APPROACH: ICD-10-PCS | Performed by: SURGERY

## 2019-09-03 PROCEDURE — 36000058 ZZH SURGERY LEVEL 3 EA 15 ADDTL MIN: Performed by: SURGERY

## 2019-09-03 PROCEDURE — 37000008 ZZH ANESTHESIA TECHNICAL FEE, 1ST 30 MIN: Performed by: SURGERY

## 2019-09-03 PROCEDURE — 88304 TISSUE EXAM BY PATHOLOGIST: CPT | Mod: 26 | Performed by: SURGERY

## 2019-09-03 PROCEDURE — 25000128 H RX IP 250 OP 636: Performed by: INTERNAL MEDICINE

## 2019-09-03 PROCEDURE — 27210339 ZZH CIRCUIT HUMIDITY W/CPAP BIP

## 2019-09-03 PROCEDURE — 82247 BILIRUBIN TOTAL: CPT | Performed by: INTERNAL MEDICINE

## 2019-09-03 PROCEDURE — 71000012 ZZH RECOVERY PHASE 1 LEVEL 1 FIRST HR: Performed by: SURGERY

## 2019-09-03 PROCEDURE — 37000009 ZZH ANESTHESIA TECHNICAL FEE, EACH ADDTL 15 MIN: Performed by: SURGERY

## 2019-09-03 PROCEDURE — 25800030 ZZH RX IP 258 OP 636: Performed by: ANESTHESIOLOGY

## 2019-09-03 PROCEDURE — 25000566 ZZH SEVOFLURANE, EA 15 MIN: Performed by: SURGERY

## 2019-09-03 PROCEDURE — 25000132 ZZH RX MED GY IP 250 OP 250 PS 637: Mod: GY | Performed by: INTERNAL MEDICINE

## 2019-09-03 PROCEDURE — 85025 COMPLETE CBC W/AUTO DIFF WBC: CPT | Performed by: INTERNAL MEDICINE

## 2019-09-03 PROCEDURE — 84450 TRANSFERASE (AST) (SGOT): CPT | Performed by: INTERNAL MEDICINE

## 2019-09-03 PROCEDURE — 84132 ASSAY OF SERUM POTASSIUM: CPT | Performed by: HOSPITALIST

## 2019-09-03 PROCEDURE — 71000013 ZZH RECOVERY PHASE 1 LEVEL 1 EA ADDTL HR: Performed by: SURGERY

## 2019-09-03 PROCEDURE — 84460 ALANINE AMINO (ALT) (SGPT): CPT | Performed by: INTERNAL MEDICINE

## 2019-09-03 PROCEDURE — 25000128 H RX IP 250 OP 636: Performed by: PHYSICIAN ASSISTANT

## 2019-09-03 PROCEDURE — 47562 LAPAROSCOPIC CHOLECYSTECTOMY: CPT | Mod: AS | Performed by: PHYSICIAN ASSISTANT

## 2019-09-03 PROCEDURE — 25800030 ZZH RX IP 258 OP 636: Performed by: NURSE ANESTHETIST, CERTIFIED REGISTERED

## 2019-09-03 PROCEDURE — 25000128 H RX IP 250 OP 636: Performed by: NURSE ANESTHETIST, CERTIFIED REGISTERED

## 2019-09-03 PROCEDURE — 27210794 ZZH OR GENERAL SUPPLY STERILE: Performed by: SURGERY

## 2019-09-03 PROCEDURE — 25000132 ZZH RX MED GY IP 250 OP 250 PS 637: Mod: GY | Performed by: HOSPITALIST

## 2019-09-03 PROCEDURE — 94660 CPAP INITIATION&MGMT: CPT

## 2019-09-03 PROCEDURE — 40000169 ZZH STATISTIC PRE-PROCEDURE ASSESSMENT I: Performed by: SURGERY

## 2019-09-03 PROCEDURE — 25000125 ZZHC RX 250: Performed by: HOSPITALIST

## 2019-09-03 PROCEDURE — 84155 ASSAY OF PROTEIN SERUM: CPT | Performed by: INTERNAL MEDICINE

## 2019-09-03 PROCEDURE — 88304 TISSUE EXAM BY PATHOLOGIST: CPT | Performed by: SURGERY

## 2019-09-03 PROCEDURE — 80069 RENAL FUNCTION PANEL: CPT | Performed by: INTERNAL MEDICINE

## 2019-09-03 RX ORDER — HYDRALAZINE HYDROCHLORIDE 20 MG/ML
10-20 INJECTION INTRAMUSCULAR; INTRAVENOUS EVERY 4 HOURS PRN
Status: DISCONTINUED | OUTPATIENT
Start: 2019-09-03 | End: 2019-09-05 | Stop reason: HOSPADM

## 2019-09-03 RX ORDER — SODIUM CHLORIDE, SODIUM LACTATE, POTASSIUM CHLORIDE, CALCIUM CHLORIDE 600; 310; 30; 20 MG/100ML; MG/100ML; MG/100ML; MG/100ML
INJECTION, SOLUTION INTRAVENOUS CONTINUOUS
Status: DISCONTINUED | OUTPATIENT
Start: 2019-09-03 | End: 2019-09-03

## 2019-09-03 RX ORDER — HYDROMORPHONE HYDROCHLORIDE 1 MG/ML
0.2 INJECTION, SOLUTION INTRAMUSCULAR; INTRAVENOUS; SUBCUTANEOUS
Status: DISCONTINUED | OUTPATIENT
Start: 2019-09-03 | End: 2019-09-05 | Stop reason: HOSPADM

## 2019-09-03 RX ORDER — NALOXONE HYDROCHLORIDE 0.4 MG/ML
.1-.4 INJECTION, SOLUTION INTRAMUSCULAR; INTRAVENOUS; SUBCUTANEOUS
Status: DISCONTINUED | OUTPATIENT
Start: 2019-09-03 | End: 2019-09-03 | Stop reason: HOSPADM

## 2019-09-03 RX ORDER — POTASSIUM CHLORIDE 1500 MG/1
20-40 TABLET, EXTENDED RELEASE ORAL
Status: DISCONTINUED | OUTPATIENT
Start: 2019-09-03 | End: 2019-09-05 | Stop reason: HOSPADM

## 2019-09-03 RX ORDER — POTASSIUM CHLORIDE 1.5 G/1.58G
20-40 POWDER, FOR SOLUTION ORAL
Status: DISCONTINUED | OUTPATIENT
Start: 2019-09-03 | End: 2019-09-05 | Stop reason: HOSPADM

## 2019-09-03 RX ORDER — MEPERIDINE HYDROCHLORIDE 25 MG/ML
12.5 INJECTION INTRAMUSCULAR; INTRAVENOUS; SUBCUTANEOUS
Status: DISCONTINUED | OUTPATIENT
Start: 2019-09-03 | End: 2019-09-03 | Stop reason: HOSPADM

## 2019-09-03 RX ORDER — POTASSIUM CHLORIDE 29.8 MG/ML
20 INJECTION INTRAVENOUS
Status: DISCONTINUED | OUTPATIENT
Start: 2019-09-03 | End: 2019-09-05 | Stop reason: HOSPADM

## 2019-09-03 RX ORDER — SODIUM CHLORIDE, SODIUM LACTATE, POTASSIUM CHLORIDE, CALCIUM CHLORIDE 600; 310; 30; 20 MG/100ML; MG/100ML; MG/100ML; MG/100ML
INJECTION, SOLUTION INTRAVENOUS CONTINUOUS PRN
Status: DISCONTINUED | OUTPATIENT
Start: 2019-09-03 | End: 2019-09-03

## 2019-09-03 RX ORDER — FENTANYL CITRATE 50 UG/ML
25-50 INJECTION, SOLUTION INTRAMUSCULAR; INTRAVENOUS
Status: DISCONTINUED | OUTPATIENT
Start: 2019-09-03 | End: 2019-09-03 | Stop reason: HOSPADM

## 2019-09-03 RX ORDER — EPHEDRINE SULFATE 50 MG/ML
INJECTION, SOLUTION INTRAMUSCULAR; INTRAVENOUS; SUBCUTANEOUS PRN
Status: DISCONTINUED | OUTPATIENT
Start: 2019-09-03 | End: 2019-09-03

## 2019-09-03 RX ORDER — HYDRALAZINE HYDROCHLORIDE 20 MG/ML
10 INJECTION INTRAMUSCULAR; INTRAVENOUS ONCE
Status: COMPLETED | OUTPATIENT
Start: 2019-09-03 | End: 2019-09-03

## 2019-09-03 RX ORDER — SODIUM CHLORIDE, SODIUM LACTATE, POTASSIUM CHLORIDE, CALCIUM CHLORIDE 600; 310; 30; 20 MG/100ML; MG/100ML; MG/100ML; MG/100ML
INJECTION, SOLUTION INTRAVENOUS CONTINUOUS
Status: DISCONTINUED | OUTPATIENT
Start: 2019-09-03 | End: 2019-09-03 | Stop reason: HOSPADM

## 2019-09-03 RX ORDER — POTASSIUM CHLORIDE 7.45 MG/ML
10 INJECTION INTRAVENOUS
Status: DISCONTINUED | OUTPATIENT
Start: 2019-09-03 | End: 2019-09-05 | Stop reason: HOSPADM

## 2019-09-03 RX ORDER — POTASSIUM CL/LIDO/0.9 % NACL 10MEQ/0.1L
10 INTRAVENOUS SOLUTION, PIGGYBACK (ML) INTRAVENOUS
Status: DISCONTINUED | OUTPATIENT
Start: 2019-09-03 | End: 2019-09-05 | Stop reason: HOSPADM

## 2019-09-03 RX ORDER — HYDROMORPHONE HYDROCHLORIDE 1 MG/ML
.3-.5 INJECTION, SOLUTION INTRAMUSCULAR; INTRAVENOUS; SUBCUTANEOUS EVERY 10 MIN PRN
Status: DISCONTINUED | OUTPATIENT
Start: 2019-09-03 | End: 2019-09-03 | Stop reason: HOSPADM

## 2019-09-03 RX ORDER — ONDANSETRON 4 MG/1
4 TABLET, ORALLY DISINTEGRATING ORAL EVERY 30 MIN PRN
Status: DISCONTINUED | OUTPATIENT
Start: 2019-09-03 | End: 2019-09-03 | Stop reason: HOSPADM

## 2019-09-03 RX ORDER — TRAMADOL HYDROCHLORIDE 50 MG/1
50-100 TABLET ORAL EVERY 6 HOURS PRN
Status: DISCONTINUED | OUTPATIENT
Start: 2019-09-03 | End: 2019-09-05 | Stop reason: HOSPADM

## 2019-09-03 RX ORDER — ONDANSETRON 2 MG/ML
4 INJECTION INTRAMUSCULAR; INTRAVENOUS EVERY 30 MIN PRN
Status: DISCONTINUED | OUTPATIENT
Start: 2019-09-03 | End: 2019-09-03 | Stop reason: HOSPADM

## 2019-09-03 RX ORDER — PROPOFOL 10 MG/ML
INJECTION, EMULSION INTRAVENOUS PRN
Status: DISCONTINUED | OUTPATIENT
Start: 2019-09-03 | End: 2019-09-03

## 2019-09-03 RX ORDER — ONDANSETRON 2 MG/ML
INJECTION INTRAMUSCULAR; INTRAVENOUS PRN
Status: DISCONTINUED | OUTPATIENT
Start: 2019-09-03 | End: 2019-09-03

## 2019-09-03 RX ORDER — FENTANYL CITRATE 50 UG/ML
INJECTION, SOLUTION INTRAMUSCULAR; INTRAVENOUS PRN
Status: DISCONTINUED | OUTPATIENT
Start: 2019-09-03 | End: 2019-09-03

## 2019-09-03 RX ORDER — LIDOCAINE HYDROCHLORIDE 20 MG/ML
INJECTION, SOLUTION INFILTRATION; PERINEURAL PRN
Status: DISCONTINUED | OUTPATIENT
Start: 2019-09-03 | End: 2019-09-03

## 2019-09-03 RX ADMIN — FENTANYL CITRATE 50 MCG: 50 INJECTION, SOLUTION INTRAMUSCULAR; INTRAVENOUS at 10:50

## 2019-09-03 RX ADMIN — ROCURONIUM BROMIDE 20 MG: 10 INJECTION INTRAVENOUS at 10:50

## 2019-09-03 RX ADMIN — PROPOFOL 40 MG: 10 INJECTION, EMULSION INTRAVENOUS at 11:02

## 2019-09-03 RX ADMIN — SODIUM CHLORIDE: 9 INJECTION, SOLUTION INTRAVENOUS at 08:24

## 2019-09-03 RX ADMIN — GABAPENTIN 300 MG: 300 CAPSULE ORAL at 21:32

## 2019-09-03 RX ADMIN — HYDRALAZINE HYDROCHLORIDE 10 MG: 20 INJECTION INTRAMUSCULAR; INTRAVENOUS at 12:04

## 2019-09-03 RX ADMIN — DEXMEDETOMIDINE HYDROCHLORIDE 8 MCG: 100 INJECTION, SOLUTION INTRAVENOUS at 10:57

## 2019-09-03 RX ADMIN — FENTANYL CITRATE 50 MCG: 50 INJECTION, SOLUTION INTRAMUSCULAR; INTRAVENOUS at 10:26

## 2019-09-03 RX ADMIN — PIPERACILLIN AND TAZOBACTAM 3.38 G: 3; .375 INJECTION, POWDER, LYOPHILIZED, FOR SOLUTION INTRAVENOUS at 12:30

## 2019-09-03 RX ADMIN — LEVOTHYROXINE SODIUM 50 MCG: 50 TABLET ORAL at 08:01

## 2019-09-03 RX ADMIN — POTASSIUM CHLORIDE 10 MEQ: 7.46 INJECTION, SOLUTION INTRAVENOUS at 11:16

## 2019-09-03 RX ADMIN — SODIUM CHLORIDE, POTASSIUM CHLORIDE, SODIUM LACTATE AND CALCIUM CHLORIDE: 600; 310; 30; 20 INJECTION, SOLUTION INTRAVENOUS at 10:20

## 2019-09-03 RX ADMIN — Medication 10 MG: at 10:31

## 2019-09-03 RX ADMIN — PIMAVANSERIN TARTRATE 34 MG: 34 CAPSULE ORAL at 08:01

## 2019-09-03 RX ADMIN — CAMPHOR AND MENTHOL: .6; .6 LOTION TOPICAL at 21:33

## 2019-09-03 RX ADMIN — PIPERACILLIN AND TAZOBACTAM 3.38 G: 3; .375 INJECTION, POWDER, LYOPHILIZED, FOR SOLUTION INTRAVENOUS at 23:44

## 2019-09-03 RX ADMIN — CARBIDOPA AND LEVODOPA 1 TABLET: 25; 100 TABLET, EXTENDED RELEASE ORAL at 08:01

## 2019-09-03 RX ADMIN — BRIMONIDINE TARTRATE 1 DROP: 2 SOLUTION/ DROPS OPHTHALMIC at 08:09

## 2019-09-03 RX ADMIN — Medication 15 MG: at 10:34

## 2019-09-03 RX ADMIN — CARBIDOPA AND LEVODOPA 1 TABLET: 25; 100 TABLET, EXTENDED RELEASE ORAL at 21:32

## 2019-09-03 RX ADMIN — ACETAMINOPHEN 650 MG: 325 TABLET ORAL at 16:24

## 2019-09-03 RX ADMIN — PROPOFOL 200 MG: 10 INJECTION, EMULSION INTRAVENOUS at 10:26

## 2019-09-03 RX ADMIN — DORZOLAMIDE HYDROCHLORIDE AND TIMOLOL MALEATE 1 DROP: 20; 5 SOLUTION/ DROPS OPHTHALMIC at 08:09

## 2019-09-03 RX ADMIN — PHENYLEPHRINE HYDROCHLORIDE 100 MCG: 10 INJECTION INTRAVENOUS at 10:40

## 2019-09-03 RX ADMIN — POTASSIUM CHLORIDE 10 MEQ: 7.46 INJECTION, SOLUTION INTRAVENOUS at 10:26

## 2019-09-03 RX ADMIN — FINASTERIDE 5 MG: 5 TABLET, FILM COATED ORAL at 20:05

## 2019-09-03 RX ADMIN — DORZOLAMIDE HYDROCHLORIDE AND TIMOLOL MALEATE 1 DROP: 20; 5 SOLUTION/ DROPS OPHTHALMIC at 16:22

## 2019-09-03 RX ADMIN — LATANOPROST 1 DROP: 50 SOLUTION/ DROPS OPHTHALMIC at 20:06

## 2019-09-03 RX ADMIN — LIDOCAINE HYDROCHLORIDE 50 MG: 20 INJECTION, SOLUTION INFILTRATION; PERINEURAL at 10:26

## 2019-09-03 RX ADMIN — ONDANSETRON 4 MG: 2 INJECTION INTRAMUSCULAR; INTRAVENOUS at 11:10

## 2019-09-03 RX ADMIN — Medication 10 MEQ: at 13:23

## 2019-09-03 RX ADMIN — BRIMONIDINE TARTRATE 1 DROP: 2 SOLUTION/ DROPS OPHTHALMIC at 16:21

## 2019-09-03 RX ADMIN — CARBIDOPA AND LEVODOPA 1 TABLET: 25; 100 TABLET, EXTENDED RELEASE ORAL at 16:23

## 2019-09-03 RX ADMIN — Medication 1 DROP: at 21:32

## 2019-09-03 RX ADMIN — Medication 10 MEQ: at 09:59

## 2019-09-03 RX ADMIN — SODIUM CHLORIDE, POTASSIUM CHLORIDE, SODIUM LACTATE AND CALCIUM CHLORIDE: 600; 310; 30; 20 INJECTION, SOLUTION INTRAVENOUS at 15:15

## 2019-09-03 RX ADMIN — PROPOFOL 40 MG: 10 INJECTION, EMULSION INTRAVENOUS at 10:55

## 2019-09-03 RX ADMIN — PIPERACILLIN AND TAZOBACTAM 3.38 G: 3; .375 INJECTION, POWDER, LYOPHILIZED, FOR SOLUTION INTRAVENOUS at 06:08

## 2019-09-03 RX ADMIN — SUGAMMADEX 160 MG: 100 INJECTION, SOLUTION INTRAVENOUS at 11:22

## 2019-09-03 RX ADMIN — Medication 10 MEQ: at 14:33

## 2019-09-03 RX ADMIN — ROCURONIUM BROMIDE 30 MG: 10 INJECTION INTRAVENOUS at 10:26

## 2019-09-03 RX ADMIN — PIPERACILLIN AND TAZOBACTAM 3.38 G: 3; .375 INJECTION, POWDER, LYOPHILIZED, FOR SOLUTION INTRAVENOUS at 18:55

## 2019-09-03 RX ADMIN — PIPERACILLIN AND TAZOBACTAM 3.38 G: 3; .375 INJECTION, POWDER, LYOPHILIZED, FOR SOLUTION INTRAVENOUS at 00:33

## 2019-09-03 RX ADMIN — ROCURONIUM BROMIDE 10 MG: 10 INJECTION INTRAVENOUS at 10:55

## 2019-09-03 ASSESSMENT — ACTIVITIES OF DAILY LIVING (ADL)
ADLS_ACUITY_SCORE: 39
ADLS_ACUITY_SCORE: 43
ADLS_ACUITY_SCORE: 40
ADLS_ACUITY_SCORE: 43
ADLS_ACUITY_SCORE: 43

## 2019-09-03 ASSESSMENT — LIFESTYLE VARIABLES: TOBACCO_USE: 0

## 2019-09-03 NOTE — PROGRESS NOTES
Pt to surgery at approximately 9:15 am. Report called to DAMARI Alba. Pt reported confused, Anjali aware.

## 2019-09-03 NOTE — BRIEF OP NOTE
[facility]    Brief Operative Note    Pre-operative diagnosis: CHOLEDOCHOLITHIASIS.  Post-operative diagnosis * same , s/p ERCP*  Procedure: Procedure(s):  CHOLECYSTECTOMY, LAPAROSCOPIC  Surgeon: Surgeon(s) and Role:     * Leonid Verdin MD - Primary   Chi Xavier PA-C assisting  Anesthesia: General   Estimated blood loss: 4cc  Drains: None  Specimens:   ID Type Source Tests Collected by Time Destination   A : gallbladder and contents Tissue Gallbladder and Contents SURGICAL PATHOLOGY EXAM Leonid Verdin MD 9/3/2019 10:58 AM      Findings:   Dilated Cystic duct. endoloop ligation.  Complications: None.  Implants:  * No implants in log *     Chi Xavier PA-C  Office: [phone]  Pager: [phone]

## 2019-09-03 NOTE — PROGRESS NOTES
Visited with patient and family. Discussed rationale for cholecystectomy in detail. Risks reviewed fully. Wife and family appear to understand and wish for us to proceed. Patient has cognitive impairment. Plan to proceed today.

## 2019-09-03 NOTE — ANESTHESIA POSTPROCEDURE EVALUATION
Patient: Sylvester Turk    Procedure(s):  CHOLECYSTECTOMY, LAPAROSCOPIC    Diagnosis:CHOLEDOCHOLITHIASIS.  Diagnosis Additional Information: No value filed.    Anesthesia Type:  General, ETT    Note:  Anesthesia Post Evaluation    Patient location during evaluation: PACU  Patient participation: Able to fully participate in evaluation  Level of consciousness: awake and alert  Pain management: adequate  Airway patency: patent  Cardiovascular status: acceptable and stable  Respiratory status: acceptable, spontaneous ventilation, unassisted and nonlabored ventilation  Hydration status: acceptable  PONV: none             Last vitals:  Vitals:    09/03/19 1240 09/03/19 1254 09/03/19 1332   BP: 125/86  119/82   Pulse: 75     Resp: 10  14   Temp: 36  C (96.8  F)     SpO2: 98% 97% 96%         Electronically Signed By: Yasir Horn MD  September 3, 2019  3:00 PM

## 2019-09-03 NOTE — ANESTHESIA CARE TRANSFER NOTE
Patient: Sylvester Turk    Procedure(s):  CHOLECYSTECTOMY, LAPAROSCOPIC    Diagnosis: CHOLEDOCHOLITHIASIS.  Diagnosis Additional Information: No value filed.    Anesthesia Type:   General, ETT     Note:  Airway :Face Mask  Patient transferred to:PACU  Comments: Spont resps and movent.  Extubated.  Good air exchange.  To PACU report to RN. Pt's BP elevated to 190/110. Dr. Ambrose's norified.Handoff Report: Identifed the Patient, Identified the Reponsible Provider, Reviewed the pertinent medical history, Discussed the surgical course, Reviewed Intra-OP anesthesia mangement and issues during anesthesia, Set expectations for post-procedure period and Allowed opportunity for questions and acknowledgement of understanding      Vitals: (Last set prior to Anesthesia Care Transfer)    CRNA VITALS  9/3/2019 1114 - 9/3/2019 1151      9/3/2019             Pulse:  56    SpO2:  100 %    Resp Rate (observed):  4  (Abnormal)                 Electronically Signed By: ADONIS Hernandez CRNA  September 3, 2019  11:51 AM

## 2019-09-03 NOTE — OP NOTE
General Surgery Operative Note    PREOPERATIVE DIAGNOSIS:  CHOLEDOCHOLITHIASIS. cholelithiasis    POSTOPERATIVE DIAGNOSIS:  Same    PROCEDURE:   Procedure(s):  CHOLECYSTECTOMY, LAPAROSCOPIC    ANESTHESIA:  General.      SURGEON:  Leonid Verdin MD    ASSISTANT:  Chi Xavier PA-C. The physician assistant was medically necessary for their expertise in camera management, suctioning, and retraction    INDICATIONS:  The patient had pain. Gallstones, and common duct stones that were removeed.  The risks, including but not limited to bleeding, infection, bile duct or bowel injury, anesthesia, and the possible need for an open approach were reviewed. The patient's family appeared to understand and wished to proceed with operation.    PROCEDURE:  The patient was taken to the operating suite.  The operative area was prepped and draped in a sterile fashion.  Surgeon initiated timeout was acknowledged.      Under general anesthesia the abdomen was insufflated through a periumbilical incision with a veress needle. Over 3 liters were place with low pressures. A 5mm trocar was placed. There was no injury seen when the camera was placed. Under direct vision a 5mm trocar was placed in the right upper quadrant and an 11mm trocar placed below the xiphoid. The gallbladder was grasped and adhesions taken down with blunt dissection and cautery. The peritoneal surfaces of the critical angle were cauterized posteriorly and anteriorly. The critical angle was dissected out, until there were only two structures remaining. Once these structures were identified,  The cystic artery was double clipped proximally, singly clipped distally and divided. The duct was large. It was isolated, the gallbladder was taken down from the top down. The duct was secured with a 0PDS endoloop and two clips.  . The gallbladder was set aside and hemostasis assured on the liver. Irrigation was used and suctioned out. The bed was dry and the clips were intact. The  gallbladder was removed through the larger incision, which was enlarged as needed to permit passage of the gallbladder. We then irrigated again, and saw no sign of blood of bile leak. We checked for veress needle injury, there was none. The large trocar was removed and the fascia closed with 0 Vicryl. Marcaine was instilled. Gas was suctioned out. Trocars were removed. Sponge count was reported as correct. All incisions were closed with 4-0 Vicryl and steri-strips.            INTRAOPERATIVE FINDINGS:  Stones in the cystic duct    Leonid Verdin MD

## 2019-09-03 NOTE — ANESTHESIA POSTPROCEDURE EVALUATION
Patient: Sylvester Turk    Procedure(s):  ENDOSCOPIC RETROGRADE CHOLANGIOPANCREATOGRAPHY, WITH CALCULUS REMOVAL USING BALLOON CATHETER  ENDOSCOPIC RETROGRADE CHOLANGIOPANCREATOGRAPHY, WITH SPHINCTEROTOMY    Diagnosis:ERCP  Diagnosis Additional Information: No value filed.    Anesthesia Type:  General, ETT    Note:  Anesthesia Post Evaluation    Patient location during evaluation: PACU  Patient participation: Able to fully participate in evaluation  Level of consciousness: awake and alert  Pain management: adequate  Airway patency: patent  Cardiovascular status: acceptable  Respiratory status: acceptable  Hydration status: acceptable  PONV: none     Anesthetic complications: None          Last vitals:  Vitals:    09/02/19 1909 09/03/19 0037 09/03/19 0200   BP: 115/85 (!) 159/105 (!) 154/100   Pulse: 53 59    Resp: 16     Temp: 36.6  C (97.9  F) 36.6  C (97.9  F)    SpO2: 96% 99%          Electronically Signed By: Lucien Dorman MD  September 3, 2019  6:15 AM

## 2019-09-03 NOTE — SIGNIFICANT EVENT
FYI Covering Hospitalist (7 am to 6pm) pager # [phone]    Benjamin Bustos MD  Hospitalist

## 2019-09-04 LAB
ALBUMIN SERPL-MCNC: 3.1 G/DL (ref 3.4–5)
ALP SERPL-CCNC: 228 U/L (ref 40–150)
ALT SERPL W P-5'-P-CCNC: 59 U/L (ref 0–70)
AST SERPL W P-5'-P-CCNC: 33 U/L (ref 0–45)
BILIRUB DIRECT SERPL-MCNC: 0.4 MG/DL (ref 0–0.2)
BILIRUB SERPL-MCNC: 1 MG/DL (ref 0.2–1.3)
COPATH REPORT: NORMAL
CREAT SERPL-MCNC: 0.8 MG/DL (ref 0.66–1.25)
ERYTHROCYTE [DISTWIDTH] IN BLOOD BY AUTOMATED COUNT: 14.8 % (ref 10–15)
GFR SERPL CREATININE-BSD FRML MDRD: 83 ML/MIN/{1.73_M2}
HCT VFR BLD AUTO: 37.9 % (ref 40–53)
HGB BLD-MCNC: 12.6 G/DL (ref 13.3–17.7)
MAGNESIUM SERPL-MCNC: 1.9 MG/DL (ref 1.6–2.3)
MCH RBC QN AUTO: 26.7 PG (ref 26.5–33)
MCHC RBC AUTO-ENTMCNC: 33.2 G/DL (ref 31.5–36.5)
MCV RBC AUTO: 80 FL (ref 78–100)
PLATELET # BLD AUTO: 187 10E9/L (ref 150–450)
POTASSIUM SERPL-SCNC: 3.5 MMOL/L (ref 3.4–5.3)
PROT SERPL-MCNC: 6.6 G/DL (ref 6.8–8.8)
RBC # BLD AUTO: 4.72 10E12/L (ref 4.4–5.9)
WBC # BLD AUTO: 9.6 10E9/L (ref 4–11)

## 2019-09-04 PROCEDURE — 25000132 ZZH RX MED GY IP 250 OP 250 PS 637: Mod: GY | Performed by: PHYSICIAN ASSISTANT

## 2019-09-04 PROCEDURE — 25000128 H RX IP 250 OP 636: Performed by: PHYSICIAN ASSISTANT

## 2019-09-04 PROCEDURE — 83735 ASSAY OF MAGNESIUM: CPT | Performed by: PHYSICIAN ASSISTANT

## 2019-09-04 PROCEDURE — 36415 COLL VENOUS BLD VENIPUNCTURE: CPT | Performed by: PHYSICIAN ASSISTANT

## 2019-09-04 PROCEDURE — 25000125 ZZHC RX 250: Performed by: PHYSICIAN ASSISTANT

## 2019-09-04 PROCEDURE — 84132 ASSAY OF SERUM POTASSIUM: CPT | Performed by: PHYSICIAN ASSISTANT

## 2019-09-04 PROCEDURE — 25000128 H RX IP 250 OP 636: Performed by: INTERNAL MEDICINE

## 2019-09-04 PROCEDURE — 99232 SBSQ HOSP IP/OBS MODERATE 35: CPT | Performed by: HOSPITALIST

## 2019-09-04 PROCEDURE — 82565 ASSAY OF CREATININE: CPT | Performed by: PHYSICIAN ASSISTANT

## 2019-09-04 PROCEDURE — 12000000 ZZH R&B MED SURG/OB

## 2019-09-04 PROCEDURE — 85027 COMPLETE CBC AUTOMATED: CPT | Performed by: PHYSICIAN ASSISTANT

## 2019-09-04 PROCEDURE — 25800030 ZZH RX IP 258 OP 636: Performed by: PHYSICIAN ASSISTANT

## 2019-09-04 PROCEDURE — 40000893 ZZH STATISTIC PT IP EVAL DEFER

## 2019-09-04 PROCEDURE — 80076 HEPATIC FUNCTION PANEL: CPT | Performed by: PHYSICIAN ASSISTANT

## 2019-09-04 RX ADMIN — PIPERACILLIN AND TAZOBACTAM 3.38 G: 3; .375 INJECTION, POWDER, LYOPHILIZED, FOR SOLUTION INTRAVENOUS at 05:41

## 2019-09-04 RX ADMIN — HYDRALAZINE HYDROCHLORIDE 20 MG: 20 INJECTION INTRAMUSCULAR; INTRAVENOUS at 15:56

## 2019-09-04 RX ADMIN — BRIMONIDINE TARTRATE 1 DROP: 2 SOLUTION/ DROPS OPHTHALMIC at 09:16

## 2019-09-04 RX ADMIN — DORZOLAMIDE HYDROCHLORIDE AND TIMOLOL MALEATE 1 DROP: 20; 5 SOLUTION/ DROPS OPHTHALMIC at 09:20

## 2019-09-04 RX ADMIN — Medication 1 DROP: at 22:10

## 2019-09-04 RX ADMIN — CARBIDOPA AND LEVODOPA 1 TABLET: 25; 100 TABLET, EXTENDED RELEASE ORAL at 16:03

## 2019-09-04 RX ADMIN — CARBIDOPA AND LEVODOPA 1 TABLET: 25; 100 TABLET, EXTENDED RELEASE ORAL at 22:10

## 2019-09-04 RX ADMIN — FINASTERIDE 5 MG: 5 TABLET, FILM COATED ORAL at 22:10

## 2019-09-04 RX ADMIN — SENNOSIDES AND DOCUSATE SODIUM 1 TABLET: 8.6; 5 TABLET ORAL at 18:46

## 2019-09-04 RX ADMIN — LATANOPROST 1 DROP: 50 SOLUTION/ DROPS OPHTHALMIC at 18:52

## 2019-09-04 RX ADMIN — GABAPENTIN 300 MG: 300 CAPSULE ORAL at 22:10

## 2019-09-04 RX ADMIN — PIMAVANSERIN TARTRATE 34 MG: 34 CAPSULE ORAL at 09:19

## 2019-09-04 RX ADMIN — ACETAMINOPHEN 650 MG: 325 TABLET ORAL at 15:02

## 2019-09-04 RX ADMIN — SODIUM CHLORIDE: 9 INJECTION, SOLUTION INTRAVENOUS at 05:41

## 2019-09-04 RX ADMIN — PIPERACILLIN AND TAZOBACTAM 3.38 G: 3; .375 INJECTION, POWDER, LYOPHILIZED, FOR SOLUTION INTRAVENOUS at 18:52

## 2019-09-04 RX ADMIN — BRIMONIDINE TARTRATE 1 DROP: 2 SOLUTION/ DROPS OPHTHALMIC at 16:04

## 2019-09-04 RX ADMIN — CARBIDOPA AND LEVODOPA 1 TABLET: 25; 100 TABLET, EXTENDED RELEASE ORAL at 09:11

## 2019-09-04 RX ADMIN — DORZOLAMIDE HYDROCHLORIDE AND TIMOLOL MALEATE 1 DROP: 20; 5 SOLUTION/ DROPS OPHTHALMIC at 16:04

## 2019-09-04 RX ADMIN — LEVOTHYROXINE SODIUM 50 MCG: 50 TABLET ORAL at 06:26

## 2019-09-04 RX ADMIN — CAMPHOR AND MENTHOL: .6; .6 LOTION TOPICAL at 22:19

## 2019-09-04 RX ADMIN — PIPERACILLIN AND TAZOBACTAM 3.38 G: 3; .375 INJECTION, POWDER, LYOPHILIZED, FOR SOLUTION INTRAVENOUS at 12:05

## 2019-09-04 ASSESSMENT — ACTIVITIES OF DAILY LIVING (ADL)
ADLS_ACUITY_SCORE: 40
ADLS_ACUITY_SCORE: 38
ADLS_ACUITY_SCORE: 39

## 2019-09-04 NOTE — CONSULTS
"Care Transition Initial Assessment - SW     Met with: Patient and Family    Active Problems:    Choledocholithiasis       DATA  Lives With: facility resident      Quality of Family Relationships: helpful, involved  Description of Support System: Supportive, Involved  Who is your support system?: Wife, Children  Support Assessment: Adequate family and caregiver support, Adequate social supports.   Quality of Family Relationships: helpful, involved       ASSESSMENT  Cognitive Status:  Unable to assess during meeting as resting. Per RN, only oriented to self    SW has reviewed pt records. Pt is Berdell \"Bud\", an 85yo gentleman who was admitted due to acute cholelithiasis, underwent lap preethi on 9/3/19. At baseline, pt resides at [facility] in the memory care [facility]. Pt requires assist of 1-2 people with transfers at baseline, uses a wheelchair. SW met with pt, his wife (Mary Ellen) and his daughter (Dot [phone], main contact) this morning to introduce self/role, perform assessment and discuss discharge planning. Pt was resting eyes throughout conversation, intermittently mumbling though did not open eyes. SW did introduce self to pt and inform him of the conversation being had and reason for SW involvement. Pt daughter and wife expressed concern with pt returning to his previous living facility as they do not feel at this time the [facility] can meet pt needs. They share that there is \"no one\" at the facility at night and they fear pt will not be safe post-op. We discussed at length the various levels of care available in the community. Transitional Care (memory vs general), Long term care (memory vs general) and looking into a different memory care [facility] all explained. We discussed that as time in hospital is short, it is unlikely that another memory care TEE will be readily available, and family continues to explore options they are comfortable with.  Per the conversation had with family, they feel that therapy would be " beneficial for pt and therefore having a PT eval was discussed with MD in order to give a recommendation. DREAD discussed that pt would need to have a skilled need for TCU in order to qualify for this level of care, discussed insurance coverage if pt meets criteria. We discussed that LTC is private pay.    DREAD provided family with SNF list and explained that all facilities offer TCU and LTC, though LTC openings are not always readily available and may be limited. Pt family interested in [facility], [facility], [facility], and [facility] if memory care indicated. DREAD faxed referrals via [facility]. DREAD also discussed that pt currently has  and this will need to be discontinued for at least 24 hours prior to admission to a facility. Pt family verbalized understanding.    RN care coordinator contacted [facility] to determine exactly what assist was provided prior to admission to better determine needs at discharge. If facility feels they are able to meet pt needs and family comfortable, pt may need to return to this setting while family continues to explore other TEE options. Pt has has [facility] at is current skilled nursing in the past, which family found helpful.     PLAN  Financial costs for the patient includes Not known at this time.  Patient given options and choices for discharge: Yes.  Patient/family is agreeable to the plan?  Yes  Transportation/person available to transport on day of discharge is: Will need stretcher transport  Patient Goals and Preferences: TCU.  Patient anticipates discharging to: TCU vs return to skilled nursing vs LTC.    VIRGILIO Perea, [facility]  Daytime (8:00am-4:30pm): [phone]  After-Hours DREAD Pager (4:30pm-11:30pm): [phone]

## 2019-09-04 NOTE — PROGRESS NOTES
[facility]    Medicine Progress Note - Hospitalist Service       Date of Admission:  8/30/2019  Assessment & Plan      The patient is an 83 year old m with chronic Lewy Body Dementia, Parkinson's Disease, ~7 cm AAA, BPH, idiopathic peripheral neuropathy, nephrolithiasis, prior left hip fracture, glaucoma, and hypothyroidism admitted from [facility] ED 8/30/2019 with choledocholithiasis.     # Acute choledocholithiasis:   - s/p ERCP with sphincterotomy, balloon sphincteroplasty and stone extraction 9/1/2019 and s/p lap preethi 9/3/19  - LFTs improving, transaminases normalized, alk phos trending down 323---228  - was noted with postop hypoxia requiring 15 L facemask oxygen and brief BiPAP, now weaned to room air  - tolerated PO and cleared by surgery for discharge    #. 7.6 cm AAA:   - Vasc Surgery evaluated; family has deferred intervention; Family and patient well aware of the consequences and if rupture will be comfort care.  - prn hydralazine     #. BPH: continue PTA finasteride     #. Hypothyroidism: continue PTA levothyroxine 50 mcg daily      #. Parkinsons disease  # Lewy body dementia  - continue PTA sinemet, PTA Nuplazid and seroquel  -per family patient mostly bedbound at baseline with limited mobility    #.  Peripheral neuropathy: continue PTA gabapentin    Diet: Advance Diet as Tolerated: Regular Diet Adult; Regular Diet Adult; Low Saturated Fat Diet    DVT Prophylaxis: Pneumatic Compression Devices  Gaines Catheter: not present  Code Status: DNR/DNI      Care plan discussed with patients daughter, care coordinator    Disposition Plan   Expected discharge: medically stable for discharge when placement  Available; PT consulted; SW following for disposition  Entered: Jose Landaverde MD 09/04/2019, 11:21 AM         Jose Landaverde MD  Hospitalist Service  [facility]    ______________________________________________________________________    Interval History   -- requiring  marci, d/shlomo this afternoon, no acute events overnight, tolerating po; had lap preethi yesterday    Data reviewed today: I reviewed all medications, new labs and imaging results over the last 24 hours. I personally reviewed no images or EKG's today.    Physical Exam   Vital Signs: Temp: 97.8  F (36.6  C) Temp src: Oral BP: (!) 165/106 Pulse: 63 Heart Rate: 74 Resp: 16 SpO2: 96 % O2 Device: Nasal cannula Oxygen Delivery: 1 LPM  Weight: 182 lbs 15.71 oz  Gen: NAD, lethargic, mumbling  Lungs: CTAB, no W/R/R  CV: RRR, no M/G/R, no JVD   Abd: non tender, non distended, active bowel sounds , no rebound or guarding   Ext: no edema  Neuro: lethargic, follows simple commands, confused and mostly bedbound at baseline      Data   Recent Labs   Lab 09/04/19  0726 09/03/19  1716 09/03/19  0658  09/01/19  0919 08/31/19  0748   WBC 9.6  --  9.0  --  8.3 8.3   HGB 12.6*  --  13.3  --  12.7* 11.9*   MCV 80  --  80  --  80 81     --  204  --  202 181   INR  --   --   --   --  1.07 1.07   NA  --   --  134  --  137 139   POTASSIUM 3.5 3.5 3.2*  --  3.5 3.6   CHLORIDE  --   --  101  --  104 105   CO2  --   --  26  --  29 28   BUN  --   --  11  --  13 14   CR 0.80  --  0.73  --  0.65* 0.62*   ANIONGAP  --   --  7  --  4 6   THI  --   --  8.5  --  8.8 8.5   GLC  --   --  89  --  74 90   ALBUMIN 3.1*  --  3.3*   < > 3.1* 3.0*   PROTTOTAL 6.6*  --  7.0   < > 6.7* 6.2*   BILITOTAL 1.0  --  1.1   < > 1.3 0.7   ALKPHOS 228*  --  279*   < > 323* 347*   ALT 59  --  67   < > 126* 134*   AST 33  --  46*   < > 67* 118*    < > = values in this interval not displayed.

## 2019-09-04 NOTE — PROGRESS NOTES
I called and spoke to Halle in nursing at [facility], phone number [phone]. Halle states that the Assisted Living provides all ADL's, all transferring help, all medication administration, feeding, etc. Patient transfers with an easy stand or 2 person assist and gait belt. Regarding cognition, no behaviors that are concerns, not a flight risk per Halle. Halle is not concerned about the current sitter . Patient can return Thursday or Friday anytime or Saturday before 3 pm. Orders should be faxed to [phone]. Halle states that patient was recently moved from the memory care to their new unit, assisted living plus, an area for patient's that have higher level needs. Halle states an example that after patient's hip surgery, patient was able to discharge to the assisted living and they were able to meet patient needs. Patient has also had [facility] services. I informed Halle that we would keep her apprised of the discharge plan.     Case management will continue to follow to finalize discharge plan.

## 2019-09-04 NOTE — DISCHARGE INSTRUCTIONS
[facility]  Discharge Instructions: Post-Operative Laparoscopic Cholecystectomy    ACTIVITY    Expect to feel tired after your surgery.  This will gradually resolve.      Take frequent, short walks and increase your activity gradually.      Avoid strenuous physical activity or heavy lifting greater than 15-20 lbs. for 2-3 weeks.  You may climb stairs.    You may drive without restrictions when you are not using any prescription pain medication and feel comfortable in a car.    You may return to work/school when you are comfortable without any prescription pain medication.    WOUND CARE    You may remove your outer dressing or Band-Aids and shower 48 hours after the surgery.  Pat your incisions dry and leave them open to air.  Re-apply dressing (Band-Aids or gauze/tape) as needed for comfort or drainage.    You may have steri-strips (looks like white tape) on your incision.  You may peel off the steri-strips 2 weeks after your surgery if they have not peeled off on their own.     Do not soak your incisions in a tub or pool for 2 weeks.     Do not apply any lotions, creams, or ointments to your incisions.    A ridge under your incisions is normal and will gradually resolve.    DIET    Start with liquids, then gradually resume your regular diet as tolerated.  Avoid heavy, spicy, and greasy meals for 2-3 days.    Drink plenty of fluids to stay hydrated.    It is not uncommon to experience some loose stools or diarrhea after surgery.  This is your body s way of adapting to the bile which will slowly drain into your intestine.  A low fat diet may help with this.  This should improve over 1-2 months.    PAIN    Expect some tenderness and discomfort at the incision sites.  Use the prescribed pain medication at your discretion.  Expect gradual resolution of your pain over several days.    You may take ibuprofen with food (unless you have been told not to) instead of or in addition to  your prescribed pain medication.  If you are taking Norco or Percocet, do not take any additional acetaminophen/APAP/Tylenol.    Do not drink alcohol or drive while you are taking pain medications.    You may apply ice to your incisions in 20 minute intervals as needed for the next 48 hours.  After that time, consider switching to heat if you prefer.    EXPECTATIONS    Pain medications can cause constipation.  Limit use when possible.  Take over the counter stool softener/stimulant, such as Colace or Senna, 1-2 times a day with plenty of water.  You may take a mild over the counter laxative, such as Miralax or a suppository, as needed.  You may discontinue these medications once you are having regular bowel movements and/or are no longer taking your narcotic pain medication.      You may have shoulder or upper back discomfort due to the gas used in surgery.  This is temporary and should resolve in 48-72 hours.  Short, frequent walks may help with this.    FOLLOW UP    Our office will contact you approximately 2 weeks to check on your progress and answer any questions you may have.  If you are doing well, you will not need to return for a follow up appointment.  If any concerns are identified over the phone, we will help you make an appointment to see a provider.     If you have not received a phone call, have any questions or concerns, or would like to be seen, please call us at [phone] and ask to speak with our nurse.  We are located at [address] [place] [address].    CALL OUR OFFICE [phone] IF YOU HAVE:     Chills or fever above 101 F.    Increased redness, warmth, or drainage at your incisions.    Significant bleeding.    Pain not relieved by your pain medication or rest.    Increasing pain after the first 48 hours.    Any other concerns or questions.    Revised January 2018

## 2019-09-04 NOTE — PROGRESS NOTES
Doing well from operation. Confused. Ate solid foods. Wounds OK. LFT's look good: declining.  OK to saline lock IV. Ready for dismissal/transfer from our view.

## 2019-09-04 NOTE — PROGRESS NOTES
SW:    I: DREAD following for discharge planning. PT has recommended return to previous longterm as pt at baseline and facility has confirmed ability to meet pt needs. SW left message with pt daughter, Dot, to update her of this and confirmed that TCU would not be appropriate to pursue, as pt does not have a skilled need for this level of care. In conversation earlier this afternoon with daughter outside of room, we discussed that it was highly likely that pt would not qualify for TCU and daughter agreed that getting pt back to a familiar setting in the short term and working on other longterm placement, if desired, could continue there. Pt daughter agreed that moving pt multiple times would not be in his best interest. SW plans to coordinate plans further with pt daughter and the TEE tomorrow. TEE has confirmed that they are able to have pt return at any time.    P: DREAD to follow.    VIRGILIO Perea, LICSW  Daytime (8:00am-4:30pm): [phone]  After-Hours SW Pager (4:30pm-11:30pm): [phone]

## 2019-09-05 VITALS
TEMPERATURE: 98.6 F | RESPIRATION RATE: 14 BRPM | HEART RATE: 66 BPM | WEIGHT: 182.98 LBS | DIASTOLIC BLOOD PRESSURE: 96 MMHG | OXYGEN SATURATION: 96 % | BODY MASS INDEX: 23.49 KG/M2 | SYSTOLIC BLOOD PRESSURE: 145 MMHG

## 2019-09-05 PROCEDURE — 25000128 H RX IP 250 OP 636: Performed by: PHYSICIAN ASSISTANT

## 2019-09-05 PROCEDURE — 99239 HOSP IP/OBS DSCHRG MGMT >30: CPT | Performed by: HOSPITALIST

## 2019-09-05 PROCEDURE — 25000125 ZZHC RX 250: Performed by: PHYSICIAN ASSISTANT

## 2019-09-05 PROCEDURE — 25000128 H RX IP 250 OP 636: Performed by: INTERNAL MEDICINE

## 2019-09-05 PROCEDURE — 25000132 ZZH RX MED GY IP 250 OP 250 PS 637: Mod: GY | Performed by: PHYSICIAN ASSISTANT

## 2019-09-05 RX ORDER — TRAMADOL HYDROCHLORIDE 50 MG/1
50 TABLET ORAL EVERY 6 HOURS PRN
Qty: 20 TABLET | Refills: 0 | Status: SHIPPED | OUTPATIENT
Start: 2019-09-05

## 2019-09-05 RX ORDER — LORATADINE 10 MG/1
10 TABLET ORAL DAILY
Status: DISCONTINUED | OUTPATIENT
Start: 2019-09-05 | End: 2019-09-05 | Stop reason: HOSPADM

## 2019-09-05 RX ADMIN — PIPERACILLIN AND TAZOBACTAM 3.38 G: 3; .375 INJECTION, POWDER, LYOPHILIZED, FOR SOLUTION INTRAVENOUS at 05:32

## 2019-09-05 RX ADMIN — DORZOLAMIDE HYDROCHLORIDE AND TIMOLOL MALEATE 1 DROP: 20; 5 SOLUTION/ DROPS OPHTHALMIC at 09:08

## 2019-09-05 RX ADMIN — PIPERACILLIN AND TAZOBACTAM 3.38 G: 3; .375 INJECTION, POWDER, LYOPHILIZED, FOR SOLUTION INTRAVENOUS at 00:44

## 2019-09-05 RX ADMIN — CARBIDOPA AND LEVODOPA 1 TABLET: 25; 100 TABLET, EXTENDED RELEASE ORAL at 09:07

## 2019-09-05 RX ADMIN — ACETAMINOPHEN 650 MG: 325 TABLET ORAL at 05:13

## 2019-09-05 RX ADMIN — PIMAVANSERIN TARTRATE 34 MG: 34 CAPSULE ORAL at 09:11

## 2019-09-05 RX ADMIN — HYDROMORPHONE HYDROCHLORIDE 0.2 MG: 1 INJECTION, SOLUTION INTRAMUSCULAR; INTRAVENOUS; SUBCUTANEOUS at 05:31

## 2019-09-05 RX ADMIN — TRAMADOL HYDROCHLORIDE 50 MG: 50 TABLET ORAL at 10:57

## 2019-09-05 RX ADMIN — PIPERACILLIN AND TAZOBACTAM 3.38 G: 3; .375 INJECTION, POWDER, LYOPHILIZED, FOR SOLUTION INTRAVENOUS at 11:51

## 2019-09-05 RX ADMIN — LEVOTHYROXINE SODIUM 50 MCG: 50 TABLET ORAL at 06:10

## 2019-09-05 RX ADMIN — LORATADINE 10 MG: 10 TABLET ORAL at 10:57

## 2019-09-05 RX ADMIN — HYDRALAZINE HYDROCHLORIDE 20 MG: 20 INJECTION INTRAMUSCULAR; INTRAVENOUS at 04:31

## 2019-09-05 RX ADMIN — ACETAMINOPHEN 650 MG: 325 TABLET ORAL at 09:24

## 2019-09-05 RX ADMIN — BRIMONIDINE TARTRATE 1 DROP: 2 SOLUTION/ DROPS OPHTHALMIC at 09:08

## 2019-09-05 ASSESSMENT — ACTIVITIES OF DAILY LIVING (ADL)
ADLS_ACUITY_SCORE: 40

## 2019-09-05 NOTE — PROGRESS NOTES
I worked with spouse and daughter Dot today to address concerns about DME for the assisted living. Both raised concerns about patient using the ez stand for transfers due to uncomfortable nature of the wrap around the body and new surgical sites. After Dot spoke to assisted living DON, they requested help in obtaining an electric benedicto lift for patient use at the assisted living. I called and worked with Charo at [employer] (phone [phone] ext [phone]) and after many discussions with family it is determined that family will do rental of mechanical benedicto ( electric is not in stock and would not be able to be delivered today, plus family would like equipment to be run through insurance and electric is not covered by insurance--family aware of all this and associated costs)    Mechanical benedicto lift rental is $95 a month with a sling provided .  Electrical benedicto lift rental is $300.  I faxed requested paperwork and prescription for mechanical benedicto lift to [employer] at [phone].    Dot and patient spouse also requested Home PT at the assisted living as they are trying to get elevated leg rests for his wheelchair. Patient's personal wheelchair was custom made and does recline but legs do not elevate. They also would like to use [facility] again for Home PT. I called them at [phone] and spoke to intake, they accepted referral and faxed orders and supporting documents to [phone] for processing.     1520 I called [facility] and spoke to Kathrin and she confirms they have the documentation they need to do delivery tonight approximately 7 pm. Waiver will be included for family to sign.Delivery address was confirmed with the assisted living.       1522: I called and spoke to Halle at the assisted [facility] and updated her on the delivery of the benedicto lift. I then called and daughter Dot with information about the delivery and need for waiver signature. She will call her Mom and  have her stay there until benedicto is delivered to sign paperwork

## 2019-09-05 NOTE — DISCHARGE SUMMARY
[facility]  Discharge Summary        Sylvester Turk MRN# [id number]   YOB: 1935 Age: 84 year old     Date of Admission:  8/30/2019  Date of Discharge:  9/5/2019  Admitting Physician:  Bobby García MD  Discharge Physician: Jose Landaverde MD  Discharging Service: Hospitalist     Primary Provider: Adolfo Tony  Primary Care Physician Phone Number: [phone]         Discharge Diagnoses/Problem Oriented Hospital Course (Providers):    Sylvester Turk was admitted on 8/30/2019 by Bobby García MD and I would refer you to their history and physical.  The following problems were addressed during his hospitalization:    The patient is an 83 year old m with chronic Lewy Body Dementia, Parkinson's Disease, ~7 cm AAA, BPH, idiopathic peripheral neuropathy, nephrolithiasis, prior left hip fracture, glaucoma, and hypothyroidism admitted from [facility] ED 8/30/2019 with choledocholithiasis.      # Acute choledocholithiasis:   # Postop acute hypoxic respiratory failure  - s/p ERCP with sphincterotomy, balloon sphincteroplasty and stone extraction 9/1/2019 and s/p lap preethi 9/3/19  - LFTs improving, transaminases normalized, alk phos trending down 323---228  - was noted with postop hypoxia requiring 15 L facemask oxygen and brief BiPAP, now weaned to room air  - tolerated PO and cleared by surgery for discharge     #. 7.6 cm AAA:   - Vasc Surgery evaluated; family has deferred intervention; Family and patient well aware of the consequences and if rupture will be comfort care.     #. BPH: continued PTA finasteride      #. Hypothyroidism: continued PTA levothyroxine 50 mcg daily      #. Parkinsons disease  # Lewy body dementia  - continue PTA sinemet, PTA Nuplazid and seroquel  - per family patient mostly bedbound at baseline with limited mobility  - evaluated by PT, rec discharge back to [facility], ordered home care PT     #.  Peripheral neuropathy: continue PTA gabapentin     Code Status:  DNR/DNI       Care plan discussed with patients daughter, wife, care coordinator            Brief Hospital Stay Summary Sent Home With Patient in AVS:               Pending Results:        Unresulted Labs Ordered in the Past 30 Days of this Admission     No orders found from 7/31/2019 to 8/31/2019.            Discharge Instructions and Follow-Up:      Follow-up Appointments     Follow-up and recommended labs and tests       Follow up with physician at [facility].  [facility] staff, please try to give his evening medications early to minimize   interruptions in sleep.  ACE wraps to both feet/ankle 12 hrs a day                 Discharge Disposition:      Discharged to [facility]         Discharge Medications:        Discharge Medication List as of 9/5/2019 12:47 PM      START taking these medications    Details   order for DME Equipment being ordered: Electric Ruddy lift for transportDisp-1 Units, R-0, Local Print         CONTINUE these medications which have CHANGED    Details   traMADol (ULTRAM) 50 MG tablet Take 1 tablet (50 mg) by mouth every 6 hours as needed for moderate pain, Disp-20 tablet, R-0, Local Print         CONTINUE these medications which have NOT CHANGED    Details   acetaminophen (TYLENOL) 325 MG tablet Take 975 mg by mouth every 8 hours as needed for mild pain, Historical      aspirin 81 MG EC tablet Take 81 mg by mouth daily, Historical      bisacodyl ([facility] BISACODYL) 10 MG suppository Place 10 mg rectally daily as needed for constipation, Historical      bismuth subsalicylate (PEPTO BISMOL) 262 MG chewable tablet Take 2 tablets by mouth every hour as needed (Do not give more than 8 tabs in 24 hours), Historical      brimonidine (ALPHAGAN) 0.2 % ophthalmic solution Place 1 drop into the right eye 2 times daily, Historical      Camphor-Eucalyptus-Menthol (VAPORIZING CHEST RUB EX) Externally apply topically as needed , Historical      carbidopa-levodopa (SINEMET CR)  MG CR tablet Take 1 tablet by  mouth 3 times daily [id number], Historical      Cholecalciferol (VITAMIN D3) 5000 units TBDP Take 1 packet by mouth daily, Historical      dorzolamide-timolol (COSOPT) 2-0.5 % ophthalmic solution Place 1 drop into the right eye 2 times daily, Historical      finasteride (PROSCAR) 5 MG tablet Take 5 mg by mouth daily, Historical      gabapentin (NEURONTIN) 300 MG capsule Take 300 mg by mouth At Bedtime , Historical      ketorolac tromethamine (ACULAR-LS) 0.4 % SOLN ophthalmic solution Apply 1 drop to eye 4 times daily Instill into operative eye(s) per physician instructions., Disp-5 mL, R-0, E-Prescribe      latanoprost (XALATAN) 0.005 % ophthalmic solution Place 1 drop into the right eye At Bedtime, Historical      levothyroxine (SYNTHROID/LEVOTHROID) 50 MCG tablet Take 50 mcg by mouth daily, Historical      loratadine (CLARITIN) 10 MG tablet Take 10 mg by mouth daily, Historical      magnesium hydroxide (MILK OF MAGNESIA) 400 MG/5ML suspension Take 15 mLs by mouth daily as needed for constipation or heartburn, Historical      menthol-zinc oxide (CALMOSEPTINE) 0.44-20.6 % OINT ointment Apply topically 3 times daily To affected area of bilateral buttock rednessHistorical      pimavanserin (NUPLAZID) 17 MG TABS tablet Take 34 mg by mouth daily, Historical      QUEtiapine (SEROQUEL) 25 MG tablet Take 12.5 mg by mouth 3 times daily as needed , Historical      senna-docusate (SENOKOT-S/PERICOLACE) 8.6-50 MG tablet Take 1 tablet by mouth 2 times daily as needed for constipation, Historical      tamsulosin (FLOMAX) 0.4 MG capsule Take 0.8 mg by mouth At Bedtime, Historical      traZODone (DESYREL) 50 MG tablet Take 50 mg by mouth At Bedtime, Historical         STOP taking these medications       camphor-menthol (SARNA) 0.5-0.5 % external lotion Comments:   Reason for Stopping:         Carboxymethylcellulose Sod PF (REFRESH PLUS) 0.5 % SOLN ophthalmic solution Comments:   Reason for Stopping:                 Allergies:        No Known Allergies        Consultations This Hospital Stay:      Consultation during this admission received from gastroenterology and surgery        Condition and Physical on Discharge:      Discharge condition: Stable   Vitals: Blood pressure (!) 145/96, pulse 66, temperature 98.6  F (37  C), temperature source Oral, resp. rate 14, weight 83 kg (182 lb 15.7 oz), SpO2 96 %.     Gen: NAD, somnolent but easily arousable and responds to voice  Lungs: CTAB  CV: RRR, no M/G/R, no JVD   Abd: mild diffuse tender + , non distended, active bowel sounds , no rebound or guarding   Ext: no edema  Neuro: follows simple commands, confused and mostly bedbound at baseline                Discharge Time:      Greater than 30 minutes.        Image Results From This Hospital Stay (For Non-EPIC Providers):        Results for orders placed or performed during the hospital encounter of 08/30/19   US Abdomen Limited    Narrative    ULTRASOUND ABDOMEN LIMITED   8/31/2019 8:39 AM     HISTORY:  Abdominal pain, choledocholithiasis, abdominal pain,  choledocholithiasis.    COMPARISON: CT abdomen and pelvis on 8/30/2019.    FINDINGS:  The exam is mildly limited due to overlying bowel gas.    Gallbladder: Multiple gallstones. Mildly thickened gallbladder wall  measures 0.4 cm. No pericholecystic fluid. Sonographic Cain's sign  is negative.    Bile ducts:  The common bile duct is not clearly visualized.    Liver: Mild intrahepatic biliary dilatation.    Pancreas:  Not visualized due to overlying bowel gas.    Right kidney:  Normal.     Aorta and IVC:  Not specifically assessed.        Impression    IMPRESSION:  Exam is mildly limited due to overlying bowel gas.  1. Cholelithiasis with mild gallbladder wall thickening. No  pericholecystic fluid, and sonographic Cain's sign is negative.  Findings less likely to represent acute cholecystitis.  2. The common bile duct is not clearly visualized due to overlying  bowel gas.  3. Mild intrahepatic  biliary dilatation, better seen on 8/30/2019 CT.    SANDRINE CHRISTIAN MD   XR ERCP    Narrative    ERCP  9/1/2019 2:59 PM     HISTORY: Suspected choledocholithiasis.    COMPARISON: None.      Impression    IMPRESSION: Three spot fluoroscopic images obtained during ERCP.  Multiple small filling defects of the distal common bile duct on  initial image. Total fluoroscopic time is 1.4 minutes.     MARTINA CORONADO MD           Most Recent Lab Results In EPIC (For Non-EPIC Providers):    Most Recent 3 CBC's:  Recent Labs   Lab Test 09/04/19 0726 09/03/19 0658 09/01/19 0919   WBC 9.6 9.0 8.3   HGB 12.6* 13.3 12.7*   MCV 80 80 80    204 202      Most Recent 3 BMP's:  Recent Labs   Lab Test 09/04/19 0726 09/03/19  1716 09/03/19 0658 09/01/19 0919 08/31/19  0748   NA  --   --  134 137 139   POTASSIUM 3.5 3.5 3.2* 3.5 3.6   CHLORIDE  --   --  101 104 105   CO2  --   --  26 29 28   BUN  --   --  11 13 14   CR 0.80  --  0.73 0.65* 0.62*   ANIONGAP  --   --  7 4 6   THI  --   --  8.5 8.8 8.5   GLC  --   --  89 74 90     Most Recent 3 Troponin's:No lab results found.    Invalid input(s): TROP, TROPONINIES  Most Recent 3 INR's:  Recent Labs   Lab Test 09/01/19 0919 08/31/19  0748   INR 1.07 1.07     Most Recent 2 LFT's:  Recent Labs   Lab Test 09/04/19 0726 09/03/19  0658   AST 33 46*   ALT 59 67   ALKPHOS 228* 279*   BILITOTAL 1.0 1.1     Most Recent Cholesterol Panel:No lab results found.  Most Recent 6 Bacteria Isolates From Any Culture (See EPIC Reports for Culture Details):No lab results found.  Most Recent TSH, T4 and HgbA1c: No lab results found.

## 2019-09-05 NOTE — PROGRESS NOTES
Patient seen and examined    Cleared by surgery for discharge, no acute issues overnight apart from mild abdominal pain; tolerating PO  PT recommended discharge back to assisted living facility as patient seems to be near his baseline for mobility  discussed with family, will order home PT    Please see discharge summary for details

## 2019-09-05 NOTE — PROGRESS NOTES
Surgery    Resting comfortably  Reports some discomfort with abd exam as expected.   APAP for pain  Add claritin 10mg as requested.   Dispo planning. May transfer today.  Surgery will call patient/representative for follow up.    Chi Xavier PA-C  Office: [phone]  Pager: [phone]

## 2019-09-05 NOTE — PROGRESS NOTES
DREAD:    I: DREAD following for discharge planning. DREAD in contact with pt daughter, Dot. We connected via phone however Dot is here at the hospital. Dot verbalized understanding that at this time pt will return to [facility] and pt at baseline functioning. DREAD encouraged Dot to hold on to the SNF list given yesterday and start exproing options for LTC and get pt on wait lists, if this level of care is desired. Dot inquired about hospice services and what this entails. DREAD discussed hospice philosophy and the various places in which hospice can take place. We discussed medicare coverage and the services provided. DREAD encouraged Dot to discuss with care team at [facility] and have a hospice agency come in for an informational meeting, also encouraged her to confirm with pt provider that they believe this to be appropriate. Dot verbalized understanding. DREAD confirmed that a stretcher ride will be needed at discharge due to pt need for monitoring (dementia dx, heavy assist of 2/lift). Dot shared that they have used this in the past and are familiar. DREAD tentatively arranged transport for 1230, though awaiting hospitalist input in regard to discharge.    P: Anticipate discharge back to [facility] via BLS stretcher at 1230 if medically appropriate. DREAD to complete PCS form.    ADDENDUM: PCS form completed at faxed to Estuardo pelayo ([phone]), original at [facility] for [employer] . Bedside RN updating pt and family on discharge time. DREAD spoke with AYAN (Halle [phone]) and updated re: discharge time today, orders will be faxed to [phone].    VIRGILIO Perea, [facility]  Daytime (8:00am-4:30pm): [phone]  After-Hours DREAD Pager (4:30pm-11:30pm): [phone]

## 2019-09-20 ENCOUNTER — RECORDS - HEALTHEAST (OUTPATIENT)
Dept: LAB | Facility: CLINIC | Age: 84
End: 2019-09-20

## 2019-09-20 LAB
ALBUMIN UR-MCNC: NEGATIVE MG/DL
AMORPH CRY #/AREA URNS HPF: ABNORMAL /[HPF]
APPEARANCE UR: ABNORMAL
BACTERIA #/AREA URNS HPF: ABNORMAL HPF
BILIRUB UR QL STRIP: NEGATIVE
COLOR UR AUTO: YELLOW
GLUCOSE UR STRIP-MCNC: NEGATIVE MG/DL
HGB UR QL STRIP: ABNORMAL
KETONES UR STRIP-MCNC: NEGATIVE MG/DL
LEUKOCYTE ESTERASE UR QL STRIP: ABNORMAL
MUCOUS THREADS #/AREA URNS LPF: ABNORMAL LPF
NITRATE UR QL: NEGATIVE
PH UR STRIP: 6.5 [PH] (ref 4.5–8)
RBC #/AREA URNS AUTO: ABNORMAL HPF
SP GR UR STRIP: 1.01 (ref 1–1.03)
SQUAMOUS #/AREA URNS AUTO: ABNORMAL LPF
UROBILINOGEN UR STRIP-ACNC: ABNORMAL
WBC #/AREA URNS AUTO: ABNORMAL HPF

## 2019-09-21 LAB — BACTERIA SPEC CULT: NO GROWTH

## 2019-10-11 ENCOUNTER — RECORDS - HEALTHEAST (OUTPATIENT)
Dept: LAB | Facility: CLINIC | Age: 84
End: 2019-10-11

## 2019-10-11 LAB
25(OH)D3 SERPL-MCNC: 21.8 NG/ML (ref 30–80)
ALBUMIN SERPL-MCNC: 3.5 G/DL (ref 3.5–5)
ALP SERPL-CCNC: 84 U/L (ref 45–120)
ALT SERPL W P-5'-P-CCNC: <9 U/L (ref 0–45)
ANION GAP SERPL CALCULATED.3IONS-SCNC: 10 MMOL/L (ref 5–18)
ANION GAP SERPL CALCULATED.3IONS-SCNC: 10 MMOL/L (ref 5–18)
AST SERPL W P-5'-P-CCNC: 12 U/L (ref 0–40)
BILIRUB SERPL-MCNC: 0.7 MG/DL (ref 0–1)
BUN SERPL-MCNC: 19 MG/DL (ref 8–28)
BUN SERPL-MCNC: 19 MG/DL (ref 8–28)
CALCIUM SERPL-MCNC: 9.1 MG/DL (ref 8.5–10.5)
CALCIUM SERPL-MCNC: 9.1 MG/DL (ref 8.5–10.5)
CHLORIDE BLD-SCNC: 104 MMOL/L (ref 98–107)
CHLORIDE BLD-SCNC: 104 MMOL/L (ref 98–107)
CO2 SERPL-SCNC: 24 MMOL/L (ref 22–31)
CO2 SERPL-SCNC: 24 MMOL/L (ref 22–31)
CREAT SERPL-MCNC: 0.75 MG/DL (ref 0.7–1.3)
CREAT SERPL-MCNC: 0.75 MG/DL (ref 0.7–1.3)
ERYTHROCYTE [DISTWIDTH] IN BLOOD BY AUTOMATED COUNT: 15 % (ref 11–14.5)
GFR SERPL CREATININE-BSD FRML MDRD: >60 ML/MIN/1.73M2
GFR SERPL CREATININE-BSD FRML MDRD: >60 ML/MIN/1.73M2
GLUCOSE BLD-MCNC: 95 MG/DL (ref 70–125)
GLUCOSE BLD-MCNC: 95 MG/DL (ref 70–125)
HCT VFR BLD AUTO: 41.6 % (ref 40–54)
HGB BLD-MCNC: 12.9 G/DL (ref 14–18)
MCH RBC QN AUTO: 26.1 PG (ref 27–34)
MCHC RBC AUTO-ENTMCNC: 31 G/DL (ref 32–36)
MCV RBC AUTO: 84 FL (ref 80–100)
PLATELET # BLD AUTO: 156 THOU/UL (ref 140–440)
PMV BLD AUTO: 10.3 FL (ref 8.5–12.5)
POTASSIUM BLD-SCNC: 3.6 MMOL/L (ref 3.5–5)
POTASSIUM BLD-SCNC: 3.6 MMOL/L (ref 3.5–5)
PROT SERPL-MCNC: 6.9 G/DL (ref 6–8)
RBC # BLD AUTO: 4.94 MILL/UL (ref 4.4–6.2)
SODIUM SERPL-SCNC: 138 MMOL/L (ref 136–145)
SODIUM SERPL-SCNC: 138 MMOL/L (ref 136–145)
TSH SERPL DL<=0.005 MIU/L-ACNC: 2.48 UIU/ML (ref 0.3–5)
WBC: 8.5 THOU/UL (ref 4–11)

## 2022-04-25 NOTE — PROGRESS NOTES
Called pt, no answer left voicemail to call office back sent letter.    [facility]    Medicine Progress Note - Hospitalist Service       Date of Admission:  8/30/2019  Assessment & Plan      The patient is an 83 year old gentleman with chronic Lewy Body Dementia, Parkinson's Disease, ~7 cm AAA, BPH, idiopathic peripheral neuropathy, nephrolithiasis, prior left hip fracture, glaucoma, and hypothyroidism admitted from [facility] ED 8/30/2019 with choledocholithiasis.     #. Acute choledocholithiasis: resolved s/p ERCP with stone removal 9/2/2019. Given that cholelithiasis a known risk factor the patient's presentation, he was s/p cholecystectomy 9/3/2019 under general anesthesia (EBL 4 ml) performed by general surgery.    -general surgery following appreciate recs   -gastroenterology following appreciate recs   -estrellita operative antibiotics per general surgery (IV piperacillin tazobactam for now)   -repeat labs in am (CBC, LFTs, Cr, Mg, K)    -advancing diet as tolerated starting w/ clear liquid diet   -prn analgesia with IV hydromorphone and tramadol   -prn zofran for nausea/emesis     #. 7.6 cm AAA: Vasc Surgery evaluated; family has deferred intervention.  Family and patient well aware of the consequences and if rupture will be comfort care.  -prn hydralazine for severe hypertension     #. Acute hypoxemic respiratory failure: due to hypoventilation following anesthesia and analgesia post op.   -prn CPAP for now, wean as the patient becomes more alert     #. BPH: continue PTA finasteride     #. Hypothyroidism: continue PTA levothyroxine 50 mcg daily      #. Parkinsons disease: continue PTA sinemet    #.  Dementia: suspect 2/2 Parkinson's, continue PTA Nuplazid and seroquel    #.  Peripheral neuropathy: continue PTA gabapentin    Diet: Advance Diet as Tolerated: Clear Liquid Diet    DVT Prophylaxis: Pneumatic Compression Devices  Gaines Catheter: not present  Code Status: DNR/DNI      Disposition Plan   Expected discharge: 1-2 days, recommended to prior living arrangement once  pain well tolerable and able to tolerate PO.  Entered: Benjamin Bustos MD 09/03/2019, 2:16 PM       The patient's care was discussed with the Patient's Family.    Benjamin Bustos MD  Hospitalist Service  [facility]    ______________________________________________________________________    Interval History   No acute events overnight. The patient is POD 0 s/p laparoscopic cholecystectomy. The patient had just returned from PACU during my evaluation. He was still quite lethargic. Still reporting diffuse abdominal pain. Not nauseated. Patient still having shallow breath sounds with respiratory rate dropping to as low as 4 at times.     Data reviewed today: I reviewed all medications, new labs and imaging results over the last 24 hours. I personally reviewed no images or EKG's today.    Physical Exam   Vital Signs: Temp: 96.8  F (36  C) Temp src: Temporal BP: 119/82 Pulse: 75 Heart Rate: 71 Resp: 14 SpO2: 96 % O2 Device: Nasal cannula Oxygen Delivery: 2 LPM  Weight: 182 lbs 15.71 oz  Gen: NAD, tired appearing   HEENT: supple, no posterior/anterior cervical LAD   Neck: supple, no posterior/anterior cervical LAD  Lungs: shallow breath sounds, CTAB, no W/R/R  CV: RRR, no M/G/R, no JVD   Abd: non tender, non distended, active bowel sounds x4, no rebound or guarding   Ext: no clubbing/cyanosis/edema, +2 dorsalis pedis pulses bilaterally   MSK: no joint effusions or tenderness, range of motion in tact to all large joints  Skin: no obvious bruising or open wounds   Neuro: AOx3, cranial nerves II-XII in tact, stable gait, 5/5 UE/LE strength bilaterally       Data   Recent Labs   Lab 09/03/19  0658 09/02/19  0654 09/01/19  0919 08/31/19  0748   WBC 9.0  --  8.3 8.3   HGB 13.3  --  12.7* 11.9*   MCV 80  --  80 81     --  202 181   INR  --   --  1.07 1.07     --  137 139   POTASSIUM 3.2*  --  3.5 3.6   CHLORIDE 101  --  104 105   CO2 26  --  29 28   BUN 11  --  13 14   CR 0.73  --  0.65* 0.62*    ANIONGAP 7  --  4 6   THI 8.5  --  8.8 8.5   GLC 89  --  74 90   ALBUMIN 3.3* 3.0* 3.1* 3.0*   PROTTOTAL 7.0 6.5* 6.7* 6.2*   BILITOTAL 1.1 1.1 1.3 0.7   ALKPHOS 279* 314* 323* 347*   ALT 67 69 126* 134*   AST 46* 60* 67* 118*

## 2022-08-19 NOTE — PLAN OF CARE
"Pt goes by \"Friedheim.\" VSS on 2 Lpm with capnography IPI: 8-10 and Co2 30-35. Disoriented to place and situation. Incontinent of bowel and bladder. Transfers with mechanical lift. Mepilex on both heels and sacrum. Pulsate Mattress placed. Pt went to surgery at 0930. Returned at approximately 1300. RT called regarding respiration depth and possible CPAP and capnography scores IPI 3-4 and C02 20. CPAP applied per RT. MD made aware via text of elevated BP and CPAP. 3 Lap sites with shadow drainage. Will continue to monitor.   "
Alert to self, calm and cooperative, occasionally pulling at IV. VSS on RA. Tylenol for abd pain. Hypo BS. Pt says passing gas. Occasionally incontinent of bladder. LS dim/clear. CMS intact, BLE 2+ edema. Mepilex to heels, blanchable redness. Tolerating clears. Repo Q2.   
Alert to self. Can be agitated at times. VSS on RA. +BS. Denies pain. BLE edema 2+. Incontinent. Repo q2. NPO since midnight.   
Alert to self. Lethargic. Inconsistent with commands. Somewhat irritable, occasionally combative. VSS on RA. Denies pain. LS clear/diminished. BS present. Incontinent of bladder. BLE 2+ edema. NPO ex ice and meds. Repo Q2. GI consult this AM.   
Confused, alert to self.  BP elevated, , afebrile, on RA.  Does not appear in pain.  LS dim.  BS active.  Incontinent of bladder and bowel, changed and repositioned.  Mepilex on coccyx changed, skin blanchable, mepilex on heels, pulled back and reapplied.  BLE edema, legs up on pillows.  Kept NPO, scheduled for surgery today.  Restless and mumbling a bit during the shift otherwise, slept some in between cares.  
Discharge Planner PT   Patient plan for discharge: Unknown  Current status: Orders received, chart reviewed, eval attempted, discussed with SW. Pt admitted with acute choledocholithiasis, AAA, hx of LBD. Well documented that pt is total cares at baseline, assist of 2 for ADLs and transfers with easy-stand, w/c bound. Discussed with , order placed to clarify discharge plan with family as they are concerned with previous TEE. Well documented conversation between SW and residential that needs are met. Family not present at this time to discuss and pt busy with nursing cares. PT and SW agree that no IP PT needs necessary following conversation.  Barriers to return to prior living situation: None  Recommendations for discharge: Return to TEE  Rationale for recommendations: Pt is at baseline mobility and not a rehab candidate per chart review. Well documented in chart that previous TEE able to accommodate pt's needs and has been. Will complete order.       Entered by: Dougie Sims 09/04/2019 3:41 PM       
Oriented to self. VSS. LS diminished. Incontinent of bowel and bladder. Denies pain. Tolerating diet. IVF. NPO at midnight. Turn and repo q2hr. Plan for surgery tomorrow.   
Prn Tylenol and Tramadol for incisional and clavicle (suspected gas) pain. VSS except BP remains elevated but stable, no need for prn meds this shift. Total assist with cares and feeding, turned and repositioned q1-2hours and prn as tolerated by pt. Incontinent of bladder, good UOP. Abdominal incisions dry and intact with no signs of infection. Pt discharging back to assisted living facility. All discharge instructions and medications reviewed with pt, spouse and daughter. All home medications returned to pt's spouse from lock box. Questions and concerns addressed, discharging via medical transport.  
Pt is disoriented x3, VSS ex HTN, PRN hydralzaine given x1 for BP of 170/106, re-check BP value of 131/95. ABD incision, closed with steri-strip, is CDI, mild bruising present. LS clear ex for R upper lobe is coarse, BS+ normoactive, flatus+ voiding adequately pt is incontinent of bladder. Mepliex to bilateral heels are CDI, mepilex to bottom is CDI. Pt is turn and repo q2h, is assistx2 and lift, is on low-fat diet. PRN tylenol for pain management, PRN dilaudid given x1.   
Pt is oriented only to self, arouses to voice. No grimacing. Firm abdomen with guarding. Shallow breathing. Urinary incontinence x 3. Turn and repo q 2 hours. Pressure ulcer / blister noticed on heel. Blanchable redness on coccyx. Mepilix applied to coccyx and heels.   
Shift [id number]: BP elevated at start of shift--193/113--PRN IV Hydralazine 20 mg given x 1, success. Disorientated to place, time, situation; forgetful. Lungs: Coarse in RUL, clear in all other lobes, RA. BS present, passing flatus, no BM. Incontinent of urine. Activity: Total care, assist of 2; ceiling lift. Diet: Low fat. Pain: scheduled Neurontin, otherwise denies.  
Updated Dr. Horn about K+ 3.2 and elevated /117. Order for K+ replacement protocol. Will assess prior to treating BP.  
VSS on RA ex hypertension, A/O only to self, irritable at times, safety sitter at bedside as he was pulling at lines. Lung sounds clear, BS active, + flatus. Patient denied pain. Incontinent of bowel and bladder. Up with assist of 2 and mechanical lift. NPO prior to ERCP. Patient had high blood pressures in PACU, but remained stable upon returning. Complained of abdominal pain post procedure, Dilaudid x1 given. Blanchable redness to bilateral heels, mepilex placed.  
VSS on RA ex hypertension, A/O to self only, easily redirectable, can be agitated at times. Lung sounds clear but diminished, BS active, no abdominal tenderness. 2+ edema to BLE, float heels. Voiding adequately, incontinent of bladder and bowel. Up with assist of 2 and mechanical lift, wheelchair bound at baseline. Clear liquid diet until midnight, then NPO.   
VSS on ra. Oriented only to self. Denies pain. Up with mechanical lift. Firm abdomen with guarding. Shallow breathing. Urinary inconitinece. Bowel movement x1. Turn and repo q2h. Pt total cares, wife feeding pt at bedside. Pressure ulcer / blister present on heel. Blanchable redness on coccyx. Preventative Mepilix applied to coccyx and heels. Tolerating a regular/ low saturated fat diet. Got Pt up to chair and commode during shift using ceiling lift. Surgical standpoint is okay to discharge. Waiting for hospitalist opinion.  following for discharge planning. Will continue to monitor.   
VSS. Alert to self only. Calm & cooperative, need reorientation, family @ bedside. Denies pain, tolerating diet. incont of bladder. L heel blister and redness on R, mapilex changed.+3 feet edema. Repo Q2, coccyx skin intact.   
Vital signs stable on 1.5 L. Had cpap on per Rt, 1600 patient became very agitated w/ mask. Returned to capnogrophy/nasal cannula. RR and O2 sats have remained stable. Frequently breathes through mouth vs nose. Oriented to self, d/o to time, situation, place. Did remember his birthday was yesterday, recognizes family members. Baseline parkinson's dementia. Family and sitter at bedside. lap incisions scant dried drainage. BS active. Abd distended, guarded/firm. Tolerated full liquids, advanced to low fat diet for dinner, well tolerated. Sitter assisted with feeding. Pulsate mattress, mepilex to coccyx, heals. Gave tylenol for pain. Bps were elevated, normotensive after pain/agitation resolved. PRN hydralazine available, none given. Incontinent of clear/yellow urine.         
Sub-acute Rehab

## (undated) DEVICE — LINEN ORTHO ACL PACK 5447

## (undated) DEVICE — BLADE KNIFE BEAVER MICROSHARP GREEN 377515

## (undated) DEVICE — SUCTION CANISTER MEDIVAC LINER 3000ML W/LID 65651-530

## (undated) DEVICE — ENDO TROCAR FIRST ENTRY KII FIOS Z-THRD 11X100MM CTF33

## (undated) DEVICE — TAPE TRANSPORE 1"X1.5YD 1534S-1

## (undated) DEVICE — NDL CANNULA INTERLINK BLUNT 18GA

## (undated) DEVICE — LINEN DRAPE 54X72" 5467

## (undated) DEVICE — ENDO TROCAR FIRST ENTRY KII FIOS Z-THRD 05X100MM CTF03

## (undated) DEVICE — BLADE SAW SAGITTAL STRK 20.7X85X0.89MM 2108-109-000S13

## (undated) DEVICE — EYE COVER TONOPEN OCU FILM LATEX 230651-002

## (undated) DEVICE — SYR 50ML LL W/O NDL 309653

## (undated) DEVICE — GLOVE GAMMEX DERMAPRENE ULTRA SZ 8.5 LF 8517

## (undated) DEVICE — ESU HOLDER LAP INST DISP PURPLE LONG 330MM H-PRO-330

## (undated) DEVICE — NDL BLUNT 18GA 1" W/O FILTER 305181

## (undated) DEVICE — LINEN FULL SHEET 5511

## (undated) DEVICE — ESU GROUND PAD ADULT W/CORD E7507

## (undated) DEVICE — SUCTION IRR STRYKERFLOW II W/TIP 250-070-520

## (undated) DEVICE — Device

## (undated) DEVICE — SU FIBERWIRE 2 38"  AR-7200

## (undated) DEVICE — BRUSH FEMORAL CANAL 210-4 0210004000

## (undated) DEVICE — PREP CHLORAPREP 26ML TINTED ORANGE  260815

## (undated) DEVICE — PACK LAP CHOLE SLC15LCFSD

## (undated) DEVICE — SOL NACL 0.9% 10ML VIAL 0409-4888-02

## (undated) DEVICE — SOL NACL 0.9% INJ 1000ML BAG 2B1324X

## (undated) DEVICE — PACK TOTAL HIP RIDGES LATEX PO15HIFSG

## (undated) DEVICE — DEVICE RETRIEVER HEWSON 71111579

## (undated) DEVICE — GLOVE PROTEXIS BLUE W/NEU-THERA 8.0  2D73EB80

## (undated) DEVICE — SUTURE MONOCRYL+ 3-0 SH 27" UNDYED MCP416H

## (undated) DEVICE — SU VICRYL 4-0 PS-2 18" UND J496H

## (undated) DEVICE — PACK CATARACT UMMC

## (undated) DEVICE — RAD RX ISOVUE 300 (50ML) 61% IOPAMIDOL CHARGE PER ML

## (undated) DEVICE — DRSG AQUACEL AG 3.5X9.75" HYDROFIBER 412011

## (undated) DEVICE — EYE PREP BETADINE 5% SOLUTION 30ML 0065-0411-30

## (undated) DEVICE — SU PDS II 0 ENDOLOOP EZ10G

## (undated) DEVICE — LINEN TOWEL PACK X5 5464

## (undated) DEVICE — SUCTION MANIFOLD NEPTUNE 2 SYS 4 PORT 0702-020-000

## (undated) DEVICE — IMM PILLOW ABDUCT HIP MED M60-025-M

## (undated) DEVICE — GLOVE PROTEXIS MICRO 6.5  2D73PM65

## (undated) DEVICE — SU ETHILON 10-0 CSM-6DA 8" 9006

## (undated) DEVICE — GLOVE PROTEXIS POWDER FREE 8.0 ORTHOPEDIC 2D73ET80

## (undated) DEVICE — SU VICRYL+ 1 MO-4 18" DYED VCP702D

## (undated) DEVICE — ENDO SCOPE WARMER LF TM500

## (undated) DEVICE — CLIP APPLIER ENDO ROTATING 10MM MED/LG ER320

## (undated) DEVICE — GLOVE PROTEXIS BLUE W/NEU-THERA 7.5  2D73EB75

## (undated) DEVICE — KIT SURGICAL TURNOVER FVSD-01D

## (undated) DEVICE — EVAC SYSTEM CLEAR FLOW SC082500

## (undated) DEVICE — DRSG GAUZE 4X4" 3033

## (undated) DEVICE — LINEN HALF SHEET 5512

## (undated) DEVICE — SU VICRYL 0 CT-1 36" J346H

## (undated) DEVICE — SOL WATER IRRIG 1000ML BOTTLE 2F7114

## (undated) DEVICE — EYE CONSTELLATION PHACO PAK 0.9MM TIPLESS 8065751155

## (undated) DEVICE — SU VICRYL 0 UR-6 27" J603H

## (undated) DEVICE — EYE KNIFE SLIT XSTAR VISITEC 2.6MM 45DEG 373726

## (undated) DEVICE — POSITIONER ARMBOARD FOAM 1PAIR LF FP-ARMB1

## (undated) DEVICE — BAG CLEAR TRASH 1.3M 39X33" P4040C

## (undated) DEVICE — GLOVE PROTEXIS POWDER FREE 7.5 ORTHOPEDIC 2D73ET75

## (undated) DEVICE — STRAP KNEE/BODY 31143004

## (undated) DEVICE — ENDO TROCAR SLEEVE KII Z-THREADED 05X100MM CTS02

## (undated) DEVICE — SOL NACL 0.9% IRRIG 1000ML BOTTLE 2F7124

## (undated) DEVICE — ESU GROUND PAD UNIVERSAL W/O CORD

## (undated) DEVICE — EYE CANN IRRIG CORTICAL CLVNG HYDRDISCTR 27GAX7/8" BC585158

## (undated) DEVICE — EYE CANN IRR 25GA CYSTOTOME 581610

## (undated) DEVICE — SU NDL MAYO TROCAR MED 217003

## (undated) DEVICE — EYE LENS CARTRIDGE D ALCON MONARCH

## (undated) RX ORDER — PROPOFOL 10 MG/ML
INJECTION, EMULSION INTRAVENOUS
Status: DISPENSED
Start: 2019-09-01

## (undated) RX ORDER — ONDANSETRON 2 MG/ML
INJECTION INTRAMUSCULAR; INTRAVENOUS
Status: DISPENSED
Start: 2019-09-01

## (undated) RX ORDER — HYDRALAZINE HYDROCHLORIDE 20 MG/ML
INJECTION INTRAMUSCULAR; INTRAVENOUS
Status: DISPENSED
Start: 2019-09-01

## (undated) RX ORDER — PROPOFOL 10 MG/ML
INJECTION, EMULSION INTRAVENOUS
Status: DISPENSED
Start: 2018-12-15

## (undated) RX ORDER — LABETALOL HYDROCHLORIDE 5 MG/ML
INJECTION, SOLUTION INTRAVENOUS
Status: DISPENSED
Start: 2019-09-01

## (undated) RX ORDER — PROPOFOL 10 MG/ML
INJECTION, EMULSION INTRAVENOUS
Status: DISPENSED
Start: 2019-05-10

## (undated) RX ORDER — LIDOCAINE HYDROCHLORIDE 20 MG/ML
INJECTION, SOLUTION EPIDURAL; INFILTRATION; INTRACAUDAL; PERINEURAL
Status: DISPENSED
Start: 2019-09-03

## (undated) RX ORDER — FENTANYL CITRATE 50 UG/ML
INJECTION, SOLUTION INTRAMUSCULAR; INTRAVENOUS
Status: DISPENSED
Start: 2018-12-15

## (undated) RX ORDER — PIPERACILLIN SODIUM, TAZOBACTAM SODIUM 3; .375 G/15ML; G/15ML
INJECTION, POWDER, LYOPHILIZED, FOR SOLUTION INTRAVENOUS
Status: DISPENSED
Start: 2019-09-03

## (undated) RX ORDER — LABETALOL HYDROCHLORIDE 5 MG/ML
INJECTION, SOLUTION INTRAVENOUS
Status: DISPENSED
Start: 2018-12-15

## (undated) RX ORDER — LIDOCAINE HYDROCHLORIDE 20 MG/ML
INJECTION, SOLUTION EPIDURAL; INFILTRATION; INTRACAUDAL; PERINEURAL
Status: DISPENSED
Start: 2019-09-01

## (undated) RX ORDER — CEFAZOLIN SODIUM 2 G/100ML
INJECTION, SOLUTION INTRAVENOUS
Status: DISPENSED
Start: 2018-12-15

## (undated) RX ORDER — GLYCOPYRROLATE 0.2 MG/ML
INJECTION INTRAMUSCULAR; INTRAVENOUS
Status: DISPENSED
Start: 2018-12-15

## (undated) RX ORDER — HYDRALAZINE HYDROCHLORIDE 20 MG/ML
INJECTION INTRAMUSCULAR; INTRAVENOUS
Status: DISPENSED
Start: 2019-09-03

## (undated) RX ORDER — FENTANYL CITRATE 50 UG/ML
INJECTION, SOLUTION INTRAMUSCULAR; INTRAVENOUS
Status: DISPENSED
Start: 2019-09-01

## (undated) RX ORDER — KETAMINE HCL IN 0.9 % NACL 50 MG/5 ML
SYRINGE (ML) INTRAVENOUS
Status: DISPENSED
Start: 2018-12-15

## (undated) RX ORDER — EPHEDRINE SULFATE 50 MG/ML
INJECTION, SOLUTION INTRAMUSCULAR; INTRAVENOUS; SUBCUTANEOUS
Status: DISPENSED
Start: 2019-05-10

## (undated) RX ORDER — PROPOFOL 10 MG/ML
INJECTION, EMULSION INTRAVENOUS
Status: DISPENSED
Start: 2019-09-03

## (undated) RX ORDER — DEXAMETHASONE SODIUM PHOSPHATE 4 MG/ML
INJECTION, SOLUTION INTRA-ARTICULAR; INTRALESIONAL; INTRAMUSCULAR; INTRAVENOUS; SOFT TISSUE
Status: DISPENSED
Start: 2019-09-01

## (undated) RX ORDER — FENTANYL CITRATE 50 UG/ML
INJECTION, SOLUTION INTRAMUSCULAR; INTRAVENOUS
Status: DISPENSED
Start: 2019-09-03

## (undated) RX ORDER — BALANCED SALT SOLUTION 6.4; .75; .48; .3; 3.9; 1.7 MG/ML; MG/ML; MG/ML; MG/ML; MG/ML; MG/ML
SOLUTION OPHTHALMIC
Status: DISPENSED
Start: 2019-05-10

## (undated) RX ORDER — FENTANYL CITRATE 50 UG/ML
INJECTION, SOLUTION INTRAMUSCULAR; INTRAVENOUS
Status: DISPENSED
Start: 2019-05-10

## (undated) RX ORDER — ESMOLOL HYDROCHLORIDE 10 MG/ML
INJECTION INTRAVENOUS
Status: DISPENSED
Start: 2019-09-01

## (undated) RX ORDER — ONDANSETRON 2 MG/ML
INJECTION INTRAMUSCULAR; INTRAVENOUS
Status: DISPENSED
Start: 2019-09-03

## (undated) RX ORDER — HYDRALAZINE HYDROCHLORIDE 20 MG/ML
INJECTION INTRAMUSCULAR; INTRAVENOUS
Status: DISPENSED
Start: 2018-12-15